# Patient Record
Sex: FEMALE | Race: ASIAN | NOT HISPANIC OR LATINO | URBAN - METROPOLITAN AREA
[De-identification: names, ages, dates, MRNs, and addresses within clinical notes are randomized per-mention and may not be internally consistent; named-entity substitution may affect disease eponyms.]

---

## 2022-01-01 ENCOUNTER — INPATIENT (INPATIENT)
Facility: HOSPITAL | Age: 72
LOS: 10 days | DRG: 23 | End: 2022-12-30
Attending: NEUROLOGICAL SURGERY | Admitting: NEUROLOGICAL SURGERY
Payer: COMMERCIAL

## 2022-01-01 ENCOUNTER — INPATIENT (INPATIENT)
Facility: HOSPITAL | Age: 72
LOS: 0 days | Discharge: ANOTHER HOSPITAL | End: 2022-12-19
Attending: INTERNAL MEDICINE | Admitting: INTERNAL MEDICINE
Payer: MEDICARE

## 2022-01-01 VITALS
RESPIRATION RATE: 18 BRPM | TEMPERATURE: 97 F | OXYGEN SATURATION: 100 % | HEART RATE: 83 BPM | SYSTOLIC BLOOD PRESSURE: 205 MMHG | DIASTOLIC BLOOD PRESSURE: 90 MMHG

## 2022-01-01 VITALS
SYSTOLIC BLOOD PRESSURE: 132 MMHG | DIASTOLIC BLOOD PRESSURE: 64 MMHG | RESPIRATION RATE: 12 BRPM | OXYGEN SATURATION: 100 % | HEART RATE: 78 BPM | TEMPERATURE: 99 F

## 2022-01-01 VITALS — WEIGHT: 117.29 LBS | HEIGHT: 64 IN

## 2022-01-01 VITALS — OXYGEN SATURATION: 98 % | HEART RATE: 100 BPM | TEMPERATURE: 100 F | RESPIRATION RATE: 19 BRPM

## 2022-01-01 DIAGNOSIS — I61.9 NONTRAUMATIC INTRACEREBRAL HEMORRHAGE, UNSPECIFIED: ICD-10-CM

## 2022-01-01 DIAGNOSIS — R53.2 FUNCTIONAL QUADRIPLEGIA: ICD-10-CM

## 2022-01-01 DIAGNOSIS — I62.9 NONTRAUMATIC INTRACRANIAL HEMORRHAGE, UNSPECIFIED: ICD-10-CM

## 2022-01-01 DIAGNOSIS — J96.90 RESPIRATORY FAILURE, UNSPECIFIED, UNSPECIFIED WHETHER WITH HYPOXIA OR HYPERCAPNIA: ICD-10-CM

## 2022-01-01 DIAGNOSIS — I61.0 NONTRAUMATIC INTRACEREBRAL HEMORRHAGE IN HEMISPHERE, SUBCORTICAL: ICD-10-CM

## 2022-01-01 DIAGNOSIS — Z51.5 ENCOUNTER FOR PALLIATIVE CARE: ICD-10-CM

## 2022-01-01 LAB
-  AMIKACIN: SIGNIFICANT CHANGE UP
-  AMOXICILLIN/CLAVULANIC ACID: SIGNIFICANT CHANGE UP
-  AMPICILLIN/SULBACTAM: SIGNIFICANT CHANGE UP
-  AMPICILLIN: SIGNIFICANT CHANGE UP
-  AZTREONAM: SIGNIFICANT CHANGE UP
-  CEFAZOLIN: SIGNIFICANT CHANGE UP
-  CEFEPIME: SIGNIFICANT CHANGE UP
-  CEFTRIAXONE: SIGNIFICANT CHANGE UP
-  CEFUROXIME: SIGNIFICANT CHANGE UP
-  CIPROFLOXACIN: SIGNIFICANT CHANGE UP
-  ERTAPENEM: SIGNIFICANT CHANGE UP
-  GENTAMICIN: SIGNIFICANT CHANGE UP
-  IMIPENEM: SIGNIFICANT CHANGE UP
-  LEVOFLOXACIN: SIGNIFICANT CHANGE UP
-  MEROPENEM: SIGNIFICANT CHANGE UP
-  NITROFURANTOIN: SIGNIFICANT CHANGE UP
-  PIPERACILLIN/TAZOBACTAM: SIGNIFICANT CHANGE UP
-  TOBRAMYCIN: SIGNIFICANT CHANGE UP
-  TRIMETHOPRIM/SULFAMETHOXAZOLE: SIGNIFICANT CHANGE UP
A1C WITH ESTIMATED AVERAGE GLUCOSE RESULT: 6.7 % — HIGH (ref 4–5.6)
A1C WITH ESTIMATED AVERAGE GLUCOSE RESULT: 6.8 % — HIGH (ref 4–5.6)
ALBUMIN SERPL ELPH-MCNC: 3.5 G/DL — SIGNIFICANT CHANGE UP (ref 3.3–5)
ALBUMIN SERPL ELPH-MCNC: 4.7 G/DL — SIGNIFICANT CHANGE UP (ref 3.3–5)
ALBUMIN SERPL ELPH-MCNC: 4.9 G/DL — SIGNIFICANT CHANGE UP (ref 3.3–5)
ALP SERPL-CCNC: 62 U/L — SIGNIFICANT CHANGE UP (ref 40–120)
ALP SERPL-CCNC: 80 U/L — SIGNIFICANT CHANGE UP (ref 40–120)
ALP SERPL-CCNC: 82 U/L — SIGNIFICANT CHANGE UP (ref 40–120)
ALT FLD-CCNC: 15 U/L — SIGNIFICANT CHANGE UP (ref 4–33)
ALT FLD-CCNC: 15 U/L — SIGNIFICANT CHANGE UP (ref 4–33)
ALT FLD-CCNC: 8 U/L — SIGNIFICANT CHANGE UP (ref 4–33)
ANION GAP SERPL CALC-SCNC: 10 MMOL/L — SIGNIFICANT CHANGE UP (ref 5–17)
ANION GAP SERPL CALC-SCNC: 11 MMOL/L — SIGNIFICANT CHANGE UP (ref 5–17)
ANION GAP SERPL CALC-SCNC: 12 MMOL/L — SIGNIFICANT CHANGE UP (ref 5–17)
ANION GAP SERPL CALC-SCNC: 12 MMOL/L — SIGNIFICANT CHANGE UP (ref 7–14)
ANION GAP SERPL CALC-SCNC: 13 MMOL/L — SIGNIFICANT CHANGE UP (ref 5–17)
ANION GAP SERPL CALC-SCNC: 14 MMOL/L — SIGNIFICANT CHANGE UP (ref 5–17)
ANION GAP SERPL CALC-SCNC: 17 MMOL/L — HIGH (ref 7–14)
ANION GAP SERPL CALC-SCNC: 18 MMOL/L — HIGH (ref 7–14)
ANION GAP SERPL CALC-SCNC: 8 MMOL/L — SIGNIFICANT CHANGE UP (ref 5–17)
ANION GAP SERPL CALC-SCNC: 9 MMOL/L — SIGNIFICANT CHANGE UP (ref 5–17)
ANION GAP SERPL CALC-SCNC: 9 MMOL/L — SIGNIFICANT CHANGE UP (ref 5–17)
ANISOCYTOSIS BLD QL: SIGNIFICANT CHANGE UP
APPEARANCE CSF: ABNORMAL
APPEARANCE CSF: ABNORMAL
APPEARANCE SPUN FLD: SIGNIFICANT CHANGE UP
APPEARANCE UR: ABNORMAL
APPEARANCE UR: ABNORMAL
APPEARANCE UR: CLEAR — SIGNIFICANT CHANGE UP
APTT BLD: 26.2 SEC — LOW (ref 27–36.3)
APTT BLD: 26.9 SEC — LOW (ref 27.5–35.5)
APTT BLD: 28.9 SEC — SIGNIFICANT CHANGE UP (ref 27–36.3)
AST SERPL-CCNC: 15 U/L — SIGNIFICANT CHANGE UP (ref 4–32)
AST SERPL-CCNC: 22 U/L — SIGNIFICANT CHANGE UP (ref 4–32)
AST SERPL-CCNC: 29 U/L — SIGNIFICANT CHANGE UP (ref 4–32)
BACTERIA # UR AUTO: ABNORMAL
BACTERIA # UR AUTO: ABNORMAL
BACTERIA # UR AUTO: NEGATIVE — SIGNIFICANT CHANGE UP
BASE EXCESS BLDV CALC-SCNC: 1.5 MMOL/L — SIGNIFICANT CHANGE UP (ref -2–3)
BASE EXCESS BLDV CALC-SCNC: 4.2 MMOL/L — HIGH (ref -2–3)
BASO STIPL BLD QL SMEAR: PRESENT — SIGNIFICANT CHANGE UP
BASOPHILS # BLD AUTO: 0.05 K/UL — SIGNIFICANT CHANGE UP (ref 0–0.2)
BASOPHILS # BLD AUTO: 0.1 K/UL — SIGNIFICANT CHANGE UP (ref 0–0.2)
BASOPHILS NFR BLD AUTO: 0.3 % — SIGNIFICANT CHANGE UP (ref 0–2)
BASOPHILS NFR BLD AUTO: 0.9 % — SIGNIFICANT CHANGE UP (ref 0–2)
BILIRUB SERPL-MCNC: 0.5 MG/DL — SIGNIFICANT CHANGE UP (ref 0.2–1.2)
BILIRUB SERPL-MCNC: 0.6 MG/DL — SIGNIFICANT CHANGE UP (ref 0.2–1.2)
BILIRUB SERPL-MCNC: 0.8 MG/DL — SIGNIFICANT CHANGE UP (ref 0.2–1.2)
BILIRUB UR-MCNC: NEGATIVE — SIGNIFICANT CHANGE UP
BLD GP AB SCN SERPL QL: NEGATIVE — SIGNIFICANT CHANGE UP
BLOOD GAS ARTERIAL COMPREHENSIVE RESULT: SIGNIFICANT CHANGE UP
BLOOD GAS VENOUS COMPREHENSIVE RESULT: SIGNIFICANT CHANGE UP
BUN SERPL-MCNC: 10 MG/DL — SIGNIFICANT CHANGE UP (ref 7–23)
BUN SERPL-MCNC: 11 MG/DL — SIGNIFICANT CHANGE UP (ref 7–23)
BUN SERPL-MCNC: 12 MG/DL — SIGNIFICANT CHANGE UP (ref 7–23)
BUN SERPL-MCNC: 14 MG/DL — SIGNIFICANT CHANGE UP (ref 7–23)
BUN SERPL-MCNC: 14 MG/DL — SIGNIFICANT CHANGE UP (ref 7–23)
BUN SERPL-MCNC: 16 MG/DL — SIGNIFICANT CHANGE UP (ref 7–23)
BUN SERPL-MCNC: 18 MG/DL — SIGNIFICANT CHANGE UP (ref 7–23)
BUN SERPL-MCNC: 18 MG/DL — SIGNIFICANT CHANGE UP (ref 7–23)
BUN SERPL-MCNC: 20 MG/DL — SIGNIFICANT CHANGE UP (ref 7–23)
BUN SERPL-MCNC: 21 MG/DL — SIGNIFICANT CHANGE UP (ref 7–23)
BUN SERPL-MCNC: 23 MG/DL — SIGNIFICANT CHANGE UP (ref 7–23)
CA-I SERPL-SCNC: 1.29 MMOL/L — SIGNIFICANT CHANGE UP (ref 1.15–1.33)
CALCIUM SERPL-MCNC: 10.5 MG/DL — SIGNIFICANT CHANGE UP (ref 8.4–10.5)
CALCIUM SERPL-MCNC: 7.6 MG/DL — LOW (ref 8.4–10.5)
CALCIUM SERPL-MCNC: 8.1 MG/DL — LOW (ref 8.4–10.5)
CALCIUM SERPL-MCNC: 8.5 MG/DL — SIGNIFICANT CHANGE UP (ref 8.4–10.5)
CALCIUM SERPL-MCNC: 8.6 MG/DL — SIGNIFICANT CHANGE UP (ref 8.4–10.5)
CALCIUM SERPL-MCNC: 8.7 MG/DL — SIGNIFICANT CHANGE UP (ref 8.4–10.5)
CALCIUM SERPL-MCNC: 8.7 MG/DL — SIGNIFICANT CHANGE UP (ref 8.4–10.5)
CALCIUM SERPL-MCNC: 8.8 MG/DL — SIGNIFICANT CHANGE UP (ref 8.4–10.5)
CALCIUM SERPL-MCNC: 8.9 MG/DL — SIGNIFICANT CHANGE UP (ref 8.4–10.5)
CALCIUM SERPL-MCNC: 9.3 MG/DL — SIGNIFICANT CHANGE UP (ref 8.4–10.5)
CALCIUM SERPL-MCNC: 9.4 MG/DL — SIGNIFICANT CHANGE UP (ref 8.4–10.5)
CALCIUM SERPL-MCNC: 9.6 MG/DL — SIGNIFICANT CHANGE UP (ref 8.4–10.5)
CHLORIDE BLDV-SCNC: 100 MMOL/L — SIGNIFICANT CHANGE UP (ref 96–108)
CHLORIDE BLDV-SCNC: 99 MMOL/L — SIGNIFICANT CHANGE UP (ref 96–108)
CHLORIDE SERPL-SCNC: 101 MMOL/L — SIGNIFICANT CHANGE UP (ref 98–107)
CHLORIDE SERPL-SCNC: 102 MMOL/L — SIGNIFICANT CHANGE UP (ref 96–108)
CHLORIDE SERPL-SCNC: 103 MMOL/L — SIGNIFICANT CHANGE UP (ref 96–108)
CHLORIDE SERPL-SCNC: 109 MMOL/L — HIGH (ref 96–108)
CHLORIDE SERPL-SCNC: 115 MMOL/L — HIGH (ref 96–108)
CHLORIDE SERPL-SCNC: 116 MMOL/L — HIGH (ref 96–108)
CHLORIDE SERPL-SCNC: 116 MMOL/L — HIGH (ref 96–108)
CHLORIDE SERPL-SCNC: 117 MMOL/L — HIGH (ref 96–108)
CHLORIDE SERPL-SCNC: 117 MMOL/L — HIGH (ref 96–108)
CHLORIDE SERPL-SCNC: 118 MMOL/L — HIGH (ref 96–108)
CHLORIDE SERPL-SCNC: 95 MMOL/L — LOW (ref 98–107)
CHLORIDE SERPL-SCNC: 97 MMOL/L — LOW (ref 98–107)
CHLORIDE SERPL-SCNC: 97 MMOL/L — SIGNIFICANT CHANGE UP (ref 96–108)
CHLORIDE SERPL-SCNC: 98 MMOL/L — SIGNIFICANT CHANGE UP (ref 96–108)
CHLORIDE SERPL-SCNC: 99 MMOL/L — SIGNIFICANT CHANGE UP (ref 96–108)
CHOLEST SERPL-MCNC: 178 MG/DL — SIGNIFICANT CHANGE UP
CO2 BLDV-SCNC: 30.4 MMOL/L — HIGH (ref 22–26)
CO2 BLDV-SCNC: 31.8 MMOL/L — HIGH (ref 22–26)
CO2 SERPL-SCNC: 21 MMOL/L — LOW (ref 22–31)
CO2 SERPL-SCNC: 22 MMOL/L — SIGNIFICANT CHANGE UP (ref 22–31)
CO2 SERPL-SCNC: 23 MMOL/L — SIGNIFICANT CHANGE UP (ref 22–31)
CO2 SERPL-SCNC: 24 MMOL/L — SIGNIFICANT CHANGE UP (ref 22–31)
CO2 SERPL-SCNC: 25 MMOL/L — SIGNIFICANT CHANGE UP (ref 22–31)
CO2 SERPL-SCNC: 26 MMOL/L — SIGNIFICANT CHANGE UP (ref 22–31)
CO2 SERPL-SCNC: 26 MMOL/L — SIGNIFICANT CHANGE UP (ref 22–31)
CO2 SERPL-SCNC: 27 MMOL/L — SIGNIFICANT CHANGE UP (ref 22–31)
CO2 SERPL-SCNC: 28 MMOL/L — SIGNIFICANT CHANGE UP (ref 22–31)
CO2 SERPL-SCNC: 28 MMOL/L — SIGNIFICANT CHANGE UP (ref 22–31)
CO2 SERPL-SCNC: 29 MMOL/L — SIGNIFICANT CHANGE UP (ref 22–31)
CO2 SERPL-SCNC: 29 MMOL/L — SIGNIFICANT CHANGE UP (ref 22–31)
COLOR CSF: ABNORMAL
COLOR CSF: ABNORMAL
COLOR SPEC: YELLOW — SIGNIFICANT CHANGE UP
COMMENT - URINE: SIGNIFICANT CHANGE UP
COMMENT - URINE: SIGNIFICANT CHANGE UP
CREAT SERPL-MCNC: 0.33 MG/DL — LOW (ref 0.5–1.3)
CREAT SERPL-MCNC: 0.34 MG/DL — LOW (ref 0.5–1.3)
CREAT SERPL-MCNC: 0.37 MG/DL — LOW (ref 0.5–1.3)
CREAT SERPL-MCNC: 0.41 MG/DL — LOW (ref 0.5–1.3)
CREAT SERPL-MCNC: 0.42 MG/DL — LOW (ref 0.5–1.3)
CREAT SERPL-MCNC: 0.44 MG/DL — LOW (ref 0.5–1.3)
CREAT SERPL-MCNC: 0.44 MG/DL — LOW (ref 0.5–1.3)
CREAT SERPL-MCNC: 0.45 MG/DL — LOW (ref 0.5–1.3)
CREAT SERPL-MCNC: 0.46 MG/DL — LOW (ref 0.5–1.3)
CREAT SERPL-MCNC: 0.46 MG/DL — LOW (ref 0.5–1.3)
CREAT SERPL-MCNC: 0.47 MG/DL — LOW (ref 0.5–1.3)
CREAT SERPL-MCNC: 0.48 MG/DL — LOW (ref 0.5–1.3)
CREAT SERPL-MCNC: 0.49 MG/DL — LOW (ref 0.5–1.3)
CREAT SERPL-MCNC: 0.49 MG/DL — LOW (ref 0.5–1.3)
CREAT SERPL-MCNC: 0.54 MG/DL — SIGNIFICANT CHANGE UP (ref 0.5–1.3)
CREAT SERPL-MCNC: 0.59 MG/DL — SIGNIFICANT CHANGE UP (ref 0.5–1.3)
CREAT SERPL-MCNC: 0.68 MG/DL — SIGNIFICANT CHANGE UP (ref 0.5–1.3)
CULTURE RESULTS: NO GROWTH — SIGNIFICANT CHANGE UP
CULTURE RESULTS: SIGNIFICANT CHANGE UP
DIFF PNL FLD: ABNORMAL
DIFF PNL FLD: NEGATIVE — SIGNIFICANT CHANGE UP
EGFR: 101 ML/MIN/1.73M2 — SIGNIFICANT CHANGE UP
EGFR: 101 ML/MIN/1.73M2 — SIGNIFICANT CHANGE UP
EGFR: 102 ML/MIN/1.73M2 — SIGNIFICANT CHANGE UP
EGFR: 102 ML/MIN/1.73M2 — SIGNIFICANT CHANGE UP
EGFR: 103 ML/MIN/1.73M2 — SIGNIFICANT CHANGE UP
EGFR: 104 ML/MIN/1.73M2 — SIGNIFICANT CHANGE UP
EGFR: 104 ML/MIN/1.73M2 — SIGNIFICANT CHANGE UP
EGFR: 105 ML/MIN/1.73M2 — SIGNIFICANT CHANGE UP
EGFR: 106 ML/MIN/1.73M2 — SIGNIFICANT CHANGE UP
EGFR: 108 ML/MIN/1.73M2 — SIGNIFICANT CHANGE UP
EGFR: 111 ML/MIN/1.73M2 — SIGNIFICANT CHANGE UP
EGFR: 111 ML/MIN/1.73M2 — SIGNIFICANT CHANGE UP
EGFR: 94 ML/MIN/1.73M2 — SIGNIFICANT CHANGE UP
EGFR: 97 ML/MIN/1.73M2 — SIGNIFICANT CHANGE UP
EGFR: 99 ML/MIN/1.73M2 — SIGNIFICANT CHANGE UP
EOSINOPHIL # BLD AUTO: 0.02 K/UL — SIGNIFICANT CHANGE UP (ref 0–0.5)
EOSINOPHIL # BLD AUTO: 0.2 K/UL — SIGNIFICANT CHANGE UP (ref 0–0.5)
EOSINOPHIL # CSF: 1 % — SIGNIFICANT CHANGE UP
EOSINOPHIL # CSF: 1 % — SIGNIFICANT CHANGE UP
EOSINOPHIL NFR BLD AUTO: 0.1 % — SIGNIFICANT CHANGE UP (ref 0–6)
EOSINOPHIL NFR BLD AUTO: 1.8 % — SIGNIFICANT CHANGE UP (ref 0–6)
EPI CELLS # UR: 1 /HPF — SIGNIFICANT CHANGE UP
EPI CELLS # UR: 3 /HPF — SIGNIFICANT CHANGE UP
EPI CELLS # UR: 4 /HPF — SIGNIFICANT CHANGE UP
ESTIMATED AVERAGE GLUCOSE: 146 — SIGNIFICANT CHANGE UP
ESTIMATED AVERAGE GLUCOSE: 148 MG/DL — HIGH (ref 68–114)
FERRITIN SERPL-MCNC: 105 NG/ML — SIGNIFICANT CHANGE UP (ref 15–150)
FLUAV AG NPH QL: SIGNIFICANT CHANGE UP
FLUBV AG NPH QL: SIGNIFICANT CHANGE UP
GAS PNL BLDA: SIGNIFICANT CHANGE UP
GAS PNL BLDV: 134 MMOL/L — LOW (ref 136–145)
GAS PNL BLDV: 136 MMOL/L — SIGNIFICANT CHANGE UP (ref 136–145)
GAS PNL BLDV: SIGNIFICANT CHANGE UP
GIANT PLATELETS BLD QL SMEAR: PRESENT — SIGNIFICANT CHANGE UP
GLUCOSE BLDC GLUCOMTR-MCNC: 103 MG/DL — HIGH (ref 70–99)
GLUCOSE BLDC GLUCOMTR-MCNC: 108 MG/DL — HIGH (ref 70–99)
GLUCOSE BLDC GLUCOMTR-MCNC: 120 MG/DL — HIGH (ref 70–99)
GLUCOSE BLDC GLUCOMTR-MCNC: 127 MG/DL — HIGH (ref 70–99)
GLUCOSE BLDC GLUCOMTR-MCNC: 137 MG/DL — HIGH (ref 70–99)
GLUCOSE BLDC GLUCOMTR-MCNC: 139 MG/DL — HIGH (ref 70–99)
GLUCOSE BLDC GLUCOMTR-MCNC: 150 MG/DL — HIGH (ref 70–99)
GLUCOSE BLDC GLUCOMTR-MCNC: 150 MG/DL — HIGH (ref 70–99)
GLUCOSE BLDC GLUCOMTR-MCNC: 157 MG/DL — HIGH (ref 70–99)
GLUCOSE BLDC GLUCOMTR-MCNC: 159 MG/DL — HIGH (ref 70–99)
GLUCOSE BLDC GLUCOMTR-MCNC: 162 MG/DL — HIGH (ref 70–99)
GLUCOSE BLDC GLUCOMTR-MCNC: 164 MG/DL — HIGH (ref 70–99)
GLUCOSE BLDC GLUCOMTR-MCNC: 166 MG/DL — HIGH (ref 70–99)
GLUCOSE BLDC GLUCOMTR-MCNC: 169 MG/DL — HIGH (ref 70–99)
GLUCOSE BLDC GLUCOMTR-MCNC: 170 MG/DL — HIGH (ref 70–99)
GLUCOSE BLDC GLUCOMTR-MCNC: 170 MG/DL — HIGH (ref 70–99)
GLUCOSE BLDC GLUCOMTR-MCNC: 171 MG/DL — HIGH (ref 70–99)
GLUCOSE BLDC GLUCOMTR-MCNC: 172 MG/DL — HIGH (ref 70–99)
GLUCOSE BLDC GLUCOMTR-MCNC: 172 MG/DL — HIGH (ref 70–99)
GLUCOSE BLDC GLUCOMTR-MCNC: 178 MG/DL — HIGH (ref 70–99)
GLUCOSE BLDC GLUCOMTR-MCNC: 180 MG/DL — HIGH (ref 70–99)
GLUCOSE BLDC GLUCOMTR-MCNC: 182 MG/DL — HIGH (ref 70–99)
GLUCOSE BLDC GLUCOMTR-MCNC: 183 MG/DL — HIGH (ref 70–99)
GLUCOSE BLDC GLUCOMTR-MCNC: 186 MG/DL — HIGH (ref 70–99)
GLUCOSE BLDC GLUCOMTR-MCNC: 190 MG/DL — HIGH (ref 70–99)
GLUCOSE BLDC GLUCOMTR-MCNC: 192 MG/DL — HIGH (ref 70–99)
GLUCOSE BLDC GLUCOMTR-MCNC: 197 MG/DL — HIGH (ref 70–99)
GLUCOSE BLDC GLUCOMTR-MCNC: 198 MG/DL — HIGH (ref 70–99)
GLUCOSE BLDC GLUCOMTR-MCNC: 213 MG/DL — HIGH (ref 70–99)
GLUCOSE BLDC GLUCOMTR-MCNC: 218 MG/DL — HIGH (ref 70–99)
GLUCOSE BLDC GLUCOMTR-MCNC: 225 MG/DL — HIGH (ref 70–99)
GLUCOSE BLDC GLUCOMTR-MCNC: 225 MG/DL — HIGH (ref 70–99)
GLUCOSE BLDC GLUCOMTR-MCNC: 227 MG/DL — HIGH (ref 70–99)
GLUCOSE BLDC GLUCOMTR-MCNC: 234 MG/DL — HIGH (ref 70–99)
GLUCOSE BLDC GLUCOMTR-MCNC: 240 MG/DL — HIGH (ref 70–99)
GLUCOSE BLDC GLUCOMTR-MCNC: 242 MG/DL — HIGH (ref 70–99)
GLUCOSE BLDC GLUCOMTR-MCNC: 257 MG/DL — HIGH (ref 70–99)
GLUCOSE BLDC GLUCOMTR-MCNC: 261 MG/DL — HIGH (ref 70–99)
GLUCOSE BLDC GLUCOMTR-MCNC: 267 MG/DL — HIGH (ref 70–99)
GLUCOSE BLDC GLUCOMTR-MCNC: 274 MG/DL — HIGH (ref 70–99)
GLUCOSE BLDC GLUCOMTR-MCNC: 293 MG/DL — HIGH (ref 70–99)
GLUCOSE BLDC GLUCOMTR-MCNC: 77 MG/DL — SIGNIFICANT CHANGE UP (ref 70–99)
GLUCOSE BLDC GLUCOMTR-MCNC: 77 MG/DL — SIGNIFICANT CHANGE UP (ref 70–99)
GLUCOSE BLDV-MCNC: 152 MG/DL — HIGH (ref 70–99)
GLUCOSE BLDV-MCNC: 191 MG/DL — HIGH (ref 70–99)
GLUCOSE CSF-MCNC: 118 MG/DL — HIGH (ref 40–70)
GLUCOSE CSF-MCNC: 124 MG/DL — HIGH (ref 40–70)
GLUCOSE SERPL-MCNC: 109 MG/DL — HIGH (ref 70–99)
GLUCOSE SERPL-MCNC: 111 MG/DL — HIGH (ref 70–99)
GLUCOSE SERPL-MCNC: 114 MG/DL — HIGH (ref 70–99)
GLUCOSE SERPL-MCNC: 144 MG/DL — HIGH (ref 70–99)
GLUCOSE SERPL-MCNC: 151 MG/DL — HIGH (ref 70–99)
GLUCOSE SERPL-MCNC: 171 MG/DL — HIGH (ref 70–99)
GLUCOSE SERPL-MCNC: 172 MG/DL — HIGH (ref 70–99)
GLUCOSE SERPL-MCNC: 182 MG/DL — HIGH (ref 70–99)
GLUCOSE SERPL-MCNC: 186 MG/DL — HIGH (ref 70–99)
GLUCOSE SERPL-MCNC: 188 MG/DL — HIGH (ref 70–99)
GLUCOSE SERPL-MCNC: 193 MG/DL — HIGH (ref 70–99)
GLUCOSE SERPL-MCNC: 194 MG/DL — HIGH (ref 70–99)
GLUCOSE SERPL-MCNC: 196 MG/DL — HIGH (ref 70–99)
GLUCOSE SERPL-MCNC: 218 MG/DL — HIGH (ref 70–99)
GLUCOSE SERPL-MCNC: 221 MG/DL — HIGH (ref 70–99)
GLUCOSE SERPL-MCNC: 221 MG/DL — HIGH (ref 70–99)
GLUCOSE SERPL-MCNC: 236 MG/DL — HIGH (ref 70–99)
GLUCOSE UR QL: ABNORMAL
GLUCOSE UR QL: NEGATIVE — SIGNIFICANT CHANGE UP
GRAM STN FLD: SIGNIFICANT CHANGE UP
HCO3 BLDV-SCNC: 29 MMOL/L — SIGNIFICANT CHANGE UP (ref 22–29)
HCO3 BLDV-SCNC: 30 MMOL/L — HIGH (ref 22–29)
HCT VFR BLD CALC: 21.9 % — LOW (ref 34.5–45)
HCT VFR BLD CALC: 22.9 % — LOW (ref 34.5–45)
HCT VFR BLD CALC: 23.9 % — LOW (ref 34.5–45)
HCT VFR BLD CALC: 24.6 % — LOW (ref 34.5–45)
HCT VFR BLD CALC: 24.8 % — LOW (ref 34.5–45)
HCT VFR BLD CALC: 25.5 % — LOW (ref 34.5–45)
HCT VFR BLD CALC: 26.7 % — LOW (ref 34.5–45)
HCT VFR BLD CALC: 27.4 % — LOW (ref 34.5–45)
HCT VFR BLD CALC: 29 % — LOW (ref 34.5–45)
HCT VFR BLD CALC: 29.6 % — LOW (ref 34.5–45)
HCT VFR BLD CALC: 29.7 % — LOW (ref 34.5–45)
HCT VFR BLD CALC: 32 % — LOW (ref 34.5–45)
HCT VFR BLD CALC: 32.1 % — LOW (ref 34.5–45)
HCT VFR BLD CALC: 32.1 % — LOW (ref 34.5–45)
HCT VFR BLD CALC: 32.6 % — LOW (ref 34.5–45)
HCT VFR BLD CALC: 34.1 % — LOW (ref 34.5–45)
HCT VFR BLD CALC: 34.9 % — SIGNIFICANT CHANGE UP (ref 34.5–45)
HCT VFR BLD CALC: 36.3 % — SIGNIFICANT CHANGE UP (ref 34.5–45)
HCT VFR BLDA CALC: 33 % — LOW (ref 34.5–46.5)
HCT VFR BLDA CALC: 33 % — LOW (ref 34.5–46.5)
HCV AB S/CO SERPL IA: 0.11 S/CO — SIGNIFICANT CHANGE UP (ref 0–0.99)
HCV AB SERPL-IMP: SIGNIFICANT CHANGE UP
HDLC SERPL-MCNC: 40 MG/DL — LOW
HGB BLD CALC-MCNC: 11 G/DL — LOW (ref 11.5–15.5)
HGB BLD CALC-MCNC: 11.1 G/DL — LOW (ref 11.5–15.5)
HGB BLD-MCNC: 10 G/DL — LOW (ref 11.5–15.5)
HGB BLD-MCNC: 10.1 G/DL — LOW (ref 11.5–15.5)
HGB BLD-MCNC: 10.2 G/DL — LOW (ref 11.5–15.5)
HGB BLD-MCNC: 11.1 G/DL — LOW (ref 11.5–15.5)
HGB BLD-MCNC: 11.7 G/DL — SIGNIFICANT CHANGE UP (ref 11.5–15.5)
HGB BLD-MCNC: 6.5 G/DL — CRITICAL LOW (ref 11.5–15.5)
HGB BLD-MCNC: 6.9 G/DL — CRITICAL LOW (ref 11.5–15.5)
HGB BLD-MCNC: 7.1 G/DL — LOW (ref 11.5–15.5)
HGB BLD-MCNC: 7.2 G/DL — LOW (ref 11.5–15.5)
HGB BLD-MCNC: 7.3 G/DL — LOW (ref 11.5–15.5)
HGB BLD-MCNC: 7.6 G/DL — LOW (ref 11.5–15.5)
HGB BLD-MCNC: 8.4 G/DL — LOW (ref 11.5–15.5)
HGB BLD-MCNC: 8.6 G/DL — LOW (ref 11.5–15.5)
HGB BLD-MCNC: 8.8 G/DL — LOW (ref 11.5–15.5)
HGB BLD-MCNC: 8.8 G/DL — LOW (ref 11.5–15.5)
HGB BLD-MCNC: 9 G/DL — LOW (ref 11.5–15.5)
HGB BLD-MCNC: 9.9 G/DL — LOW (ref 11.5–15.5)
HGB BLD-MCNC: 9.9 G/DL — LOW (ref 11.5–15.5)
HYALINE CASTS # UR AUTO: 0 /LPF — SIGNIFICANT CHANGE UP (ref 0–2)
HYALINE CASTS # UR AUTO: 1 /LPF — SIGNIFICANT CHANGE UP (ref 0–2)
HYALINE CASTS # UR AUTO: 1 /LPF — SIGNIFICANT CHANGE UP (ref 0–2)
HYALINE CASTS # UR AUTO: 2 /LPF — SIGNIFICANT CHANGE UP (ref 0–2)
HYALINE CASTS # UR AUTO: 6 /LPF — HIGH (ref 0–2)
HYPOCHROMIA BLD QL: SIGNIFICANT CHANGE UP
IANC: 13.74 K/UL — HIGH (ref 1.8–7.4)
IANC: 5.89 K/UL — SIGNIFICANT CHANGE UP (ref 1.8–7.4)
IMM GRANULOCYTES NFR BLD AUTO: 0.5 % — SIGNIFICANT CHANGE UP (ref 0–0.9)
INR BLD: 0.97 RATIO — SIGNIFICANT CHANGE UP (ref 0.88–1.16)
INR BLD: 0.99 RATIO — SIGNIFICANT CHANGE UP (ref 0.88–1.16)
INR BLD: 1.19 RATIO — HIGH (ref 0.88–1.16)
IRON SATN MFR SERPL: 14 % — SIGNIFICANT CHANGE UP (ref 14–50)
IRON SATN MFR SERPL: 33 UG/DL — SIGNIFICANT CHANGE UP (ref 30–160)
KETONES UR-MCNC: NEGATIVE — SIGNIFICANT CHANGE UP
KETONES UR-MCNC: NEGATIVE — SIGNIFICANT CHANGE UP
KETONES UR-MCNC: SIGNIFICANT CHANGE UP
LACTATE BLDV-MCNC: 2.9 MMOL/L — HIGH (ref 0.5–2)
LACTATE BLDV-MCNC: 3.6 MMOL/L — HIGH (ref 0.5–2)
LACTATE CSF-MCNC: 4.8 MMOL/L — HIGH (ref 1.1–2.4)
LACTATE CSF-MCNC: 6.4 MMOL/L — HIGH (ref 1.1–2.4)
LEUKOCYTE ESTERASE UR-ACNC: ABNORMAL
LEUKOCYTE ESTERASE UR-ACNC: NEGATIVE — SIGNIFICANT CHANGE UP
LIPID PNL WITH DIRECT LDL SERPL: 97 MG/DL — SIGNIFICANT CHANGE UP
LYMPHOCYTES # BLD AUTO: 1.16 K/UL — SIGNIFICANT CHANGE UP (ref 1–3.3)
LYMPHOCYTES # BLD AUTO: 1.3 K/UL — SIGNIFICANT CHANGE UP (ref 1–3.3)
LYMPHOCYTES # BLD AUTO: 11.6 % — LOW (ref 13–44)
LYMPHOCYTES # BLD AUTO: 7.5 % — LOW (ref 13–44)
LYMPHOCYTES # CSF: 4 % — LOW (ref 40–80)
LYMPHOCYTES # CSF: 7 % — LOW (ref 40–80)
MAGNESIUM SERPL-MCNC: 1.7 MG/DL — SIGNIFICANT CHANGE UP (ref 1.6–2.6)
MAGNESIUM SERPL-MCNC: 1.8 MG/DL — SIGNIFICANT CHANGE UP (ref 1.6–2.6)
MAGNESIUM SERPL-MCNC: 1.9 MG/DL — SIGNIFICANT CHANGE UP (ref 1.6–2.6)
MAGNESIUM SERPL-MCNC: 2 MG/DL — SIGNIFICANT CHANGE UP (ref 1.6–2.6)
MAGNESIUM SERPL-MCNC: 2.1 MG/DL — SIGNIFICANT CHANGE UP (ref 1.6–2.6)
MAGNESIUM SERPL-MCNC: 2.1 MG/DL — SIGNIFICANT CHANGE UP (ref 1.6–2.6)
MAGNESIUM SERPL-MCNC: 2.2 MG/DL — SIGNIFICANT CHANGE UP (ref 1.6–2.6)
MAGNESIUM SERPL-MCNC: 2.3 MG/DL — SIGNIFICANT CHANGE UP (ref 1.6–2.6)
MAGNESIUM SERPL-MCNC: 2.7 MG/DL — HIGH (ref 1.6–2.6)
MCHC RBC-ENTMCNC: 18.7 PG — LOW (ref 27–34)
MCHC RBC-ENTMCNC: 18.8 PG — LOW (ref 27–34)
MCHC RBC-ENTMCNC: 18.8 PG — LOW (ref 27–34)
MCHC RBC-ENTMCNC: 19 PG — LOW (ref 27–34)
MCHC RBC-ENTMCNC: 19 PG — LOW (ref 27–34)
MCHC RBC-ENTMCNC: 19.1 PG — LOW (ref 27–34)
MCHC RBC-ENTMCNC: 19.2 PG — LOW (ref 27–34)
MCHC RBC-ENTMCNC: 19.2 PG — LOW (ref 27–34)
MCHC RBC-ENTMCNC: 19.3 PG — LOW (ref 27–34)
MCHC RBC-ENTMCNC: 19.7 PG — LOW (ref 27–34)
MCHC RBC-ENTMCNC: 20.7 PG — LOW (ref 27–34)
MCHC RBC-ENTMCNC: 20.8 PG — LOW (ref 27–34)
MCHC RBC-ENTMCNC: 20.9 PG — LOW (ref 27–34)
MCHC RBC-ENTMCNC: 21.1 PG — LOW (ref 27–34)
MCHC RBC-ENTMCNC: 21.2 PG — LOW (ref 27–34)
MCHC RBC-ENTMCNC: 21.3 PG — LOW (ref 27–34)
MCHC RBC-ENTMCNC: 21.3 PG — LOW (ref 27–34)
MCHC RBC-ENTMCNC: 29 GM/DL — LOW (ref 32–36)
MCHC RBC-ENTMCNC: 29.6 GM/DL — LOW (ref 32–36)
MCHC RBC-ENTMCNC: 29.7 GM/DL — LOW (ref 32–36)
MCHC RBC-ENTMCNC: 29.8 GM/DL — LOW (ref 32–36)
MCHC RBC-ENTMCNC: 30.1 GM/DL — LOW (ref 32–36)
MCHC RBC-ENTMCNC: 30.3 GM/DL — LOW (ref 32–36)
MCHC RBC-ENTMCNC: 30.3 GM/DL — LOW (ref 32–36)
MCHC RBC-ENTMCNC: 30.6 GM/DL — LOW (ref 32–36)
MCHC RBC-ENTMCNC: 30.7 GM/DL — LOW (ref 32–36)
MCHC RBC-ENTMCNC: 30.8 GM/DL — LOW (ref 32–36)
MCHC RBC-ENTMCNC: 30.9 GM/DL — LOW (ref 32–36)
MCHC RBC-ENTMCNC: 31.4 GM/DL — LOW (ref 32–36)
MCHC RBC-ENTMCNC: 31.5 GM/DL — LOW (ref 32–36)
MCHC RBC-ENTMCNC: 31.8 GM/DL — LOW (ref 32–36)
MCHC RBC-ENTMCNC: 32 PG — SIGNIFICANT CHANGE UP (ref 27–34)
MCHC RBC-ENTMCNC: 33.5 GM/DL — SIGNIFICANT CHANGE UP (ref 32–36)
MCV RBC AUTO: 62.4 FL — LOW (ref 80–100)
MCV RBC AUTO: 62.7 FL — LOW (ref 80–100)
MCV RBC AUTO: 63.3 FL — LOW (ref 80–100)
MCV RBC AUTO: 63.3 FL — LOW (ref 80–100)
MCV RBC AUTO: 64.1 FL — LOW (ref 80–100)
MCV RBC AUTO: 64.6 FL — LOW (ref 80–100)
MCV RBC AUTO: 64.6 FL — LOW (ref 80–100)
MCV RBC AUTO: 64.8 FL — LOW (ref 80–100)
MCV RBC AUTO: 64.8 FL — LOW (ref 80–100)
MCV RBC AUTO: 65.2 FL — LOW (ref 80–100)
MCV RBC AUTO: 66.9 FL — LOW (ref 80–100)
MCV RBC AUTO: 67 FL — LOW (ref 80–100)
MCV RBC AUTO: 67.4 FL — LOW (ref 80–100)
MCV RBC AUTO: 67.7 FL — LOW (ref 80–100)
MCV RBC AUTO: 68 FL — LOW (ref 80–100)
MCV RBC AUTO: 68.1 FL — LOW (ref 80–100)
MCV RBC AUTO: 69.1 FL — LOW (ref 80–100)
MCV RBC AUTO: 95.4 FL — SIGNIFICANT CHANGE UP (ref 80–100)
METHOD TYPE: SIGNIFICANT CHANGE UP
MICROCYTES BLD QL: SIGNIFICANT CHANGE UP
MONOCYTES # BLD AUTO: 0.2 K/UL — SIGNIFICANT CHANGE UP (ref 0–0.9)
MONOCYTES # BLD AUTO: 0.34 K/UL — SIGNIFICANT CHANGE UP (ref 0–0.9)
MONOCYTES NFR BLD AUTO: 1.8 % — LOW (ref 2–14)
MONOCYTES NFR BLD AUTO: 2.2 % — SIGNIFICANT CHANGE UP (ref 2–14)
MONOS+MACROS NFR CSF: 4 % — LOW (ref 15–45)
MONOS+MACROS NFR CSF: 4 % — LOW (ref 15–45)
MRSA PCR RESULT.: SIGNIFICANT CHANGE UP
MRSA PCR RESULT.: SIGNIFICANT CHANGE UP
NEUTROPHILS # BLD AUTO: 13.74 K/UL — HIGH (ref 1.8–7.4)
NEUTROPHILS # BLD AUTO: 6.09 K/UL — SIGNIFICANT CHANGE UP (ref 1.8–7.4)
NEUTROPHILS # CSF: 88 % — HIGH (ref 0–6)
NEUTROPHILS # CSF: 91 % — HIGH (ref 0–6)
NEUTROPHILS NFR BLD AUTO: 53.6 % — SIGNIFICANT CHANGE UP (ref 43–77)
NEUTROPHILS NFR BLD AUTO: 89.4 % — HIGH (ref 43–77)
NEUTS BAND # BLD: 0.9 % — SIGNIFICANT CHANGE UP (ref 0–6)
NITRITE UR-MCNC: NEGATIVE — SIGNIFICANT CHANGE UP
NON HDL CHOLESTEROL: 138 MG/DL — HIGH
NRBC # BLD: 0 /100 WBCS — SIGNIFICANT CHANGE UP (ref 0–0)
NRBC # FLD: 0 K/UL — SIGNIFICANT CHANGE UP (ref 0–0)
NRBC NFR CSF: 400 /UL — HIGH (ref 0–5)
NRBC NFR CSF: 89 /UL — HIGH (ref 0–5)
ORGANISM # SPEC MICROSCOPIC CNT: SIGNIFICANT CHANGE UP
ORGANISM # SPEC MICROSCOPIC CNT: SIGNIFICANT CHANGE UP
OVALOCYTES BLD QL SMEAR: SLIGHT — SIGNIFICANT CHANGE UP
PA ADP PRP-ACNC: 278 PRU — SIGNIFICANT CHANGE UP (ref 194–417)
PCO2 BLDV: 51 MMHG — HIGH (ref 39–42)
PCO2 BLDV: 57 MMHG — HIGH (ref 39–42)
PH BLDV: 7.31 — LOW (ref 7.32–7.43)
PH BLDV: 7.38 — SIGNIFICANT CHANGE UP (ref 7.32–7.43)
PH UR: 6 — SIGNIFICANT CHANGE UP (ref 5–8)
PH UR: 6 — SIGNIFICANT CHANGE UP (ref 5–8)
PH UR: 6.5 — SIGNIFICANT CHANGE UP (ref 5–8)
PH UR: 7 — SIGNIFICANT CHANGE UP (ref 5–8)
PH UR: 7.5 — SIGNIFICANT CHANGE UP (ref 5–8)
PHOSPHATE SERPL-MCNC: 1.5 MG/DL — LOW (ref 2.5–4.5)
PHOSPHATE SERPL-MCNC: 1.8 MG/DL — LOW (ref 2.5–4.5)
PHOSPHATE SERPL-MCNC: 2.4 MG/DL — LOW (ref 2.5–4.5)
PHOSPHATE SERPL-MCNC: 2.5 MG/DL — SIGNIFICANT CHANGE UP (ref 2.5–4.5)
PHOSPHATE SERPL-MCNC: 2.9 MG/DL — SIGNIFICANT CHANGE UP (ref 2.5–4.5)
PHOSPHATE SERPL-MCNC: 2.9 MG/DL — SIGNIFICANT CHANGE UP (ref 2.5–4.5)
PHOSPHATE SERPL-MCNC: 3.1 MG/DL — SIGNIFICANT CHANGE UP (ref 2.5–4.5)
PHOSPHATE SERPL-MCNC: 3.2 MG/DL — SIGNIFICANT CHANGE UP (ref 2.5–4.5)
PHOSPHATE SERPL-MCNC: 3.3 MG/DL — SIGNIFICANT CHANGE UP (ref 2.5–4.5)
PHOSPHATE SERPL-MCNC: 3.3 MG/DL — SIGNIFICANT CHANGE UP (ref 2.5–4.5)
PHOSPHATE SERPL-MCNC: 3.7 MG/DL — SIGNIFICANT CHANGE UP (ref 2.5–4.5)
PHOSPHATE SERPL-MCNC: 4.1 MG/DL — SIGNIFICANT CHANGE UP (ref 2.5–4.5)
PHOSPHATE SERPL-MCNC: 4.2 MG/DL — SIGNIFICANT CHANGE UP (ref 2.5–4.5)
PHOSPHATE SERPL-MCNC: 4.5 MG/DL — SIGNIFICANT CHANGE UP (ref 2.5–4.5)
PHOSPHATE SERPL-MCNC: 4.8 MG/DL — HIGH (ref 2.5–4.5)
PLAT MORPH BLD: ABNORMAL
PLATELET # BLD AUTO: 260 K/UL — SIGNIFICANT CHANGE UP (ref 150–400)
PLATELET # BLD AUTO: 266 K/UL — SIGNIFICANT CHANGE UP (ref 150–400)
PLATELET # BLD AUTO: 274 K/UL — SIGNIFICANT CHANGE UP (ref 150–400)
PLATELET # BLD AUTO: 275 K/UL — SIGNIFICANT CHANGE UP (ref 150–400)
PLATELET # BLD AUTO: 287 K/UL — SIGNIFICANT CHANGE UP (ref 150–400)
PLATELET # BLD AUTO: 288 K/UL — SIGNIFICANT CHANGE UP (ref 150–400)
PLATELET # BLD AUTO: 294 K/UL — SIGNIFICANT CHANGE UP (ref 150–400)
PLATELET # BLD AUTO: 299 K/UL — SIGNIFICANT CHANGE UP (ref 150–400)
PLATELET # BLD AUTO: 326 K/UL — SIGNIFICANT CHANGE UP (ref 150–400)
PLATELET # BLD AUTO: 327 K/UL — SIGNIFICANT CHANGE UP (ref 150–400)
PLATELET # BLD AUTO: 373 K/UL — SIGNIFICANT CHANGE UP (ref 150–400)
PLATELET # BLD AUTO: 373 K/UL — SIGNIFICANT CHANGE UP (ref 150–400)
PLATELET # BLD AUTO: 395 K/UL — SIGNIFICANT CHANGE UP (ref 150–400)
PLATELET # BLD AUTO: 420 K/UL — HIGH (ref 150–400)
PLATELET # BLD AUTO: 433 K/UL — HIGH (ref 150–400)
PLATELET # BLD AUTO: 480 K/UL — HIGH (ref 150–400)
PLATELET # BLD AUTO: 511 K/UL — HIGH (ref 150–400)
PLATELET # BLD AUTO: 574 K/UL — HIGH (ref 150–400)
PLATELET COUNT - ESTIMATE: NORMAL — SIGNIFICANT CHANGE UP
PLATELET RESPONSE ASPIRIN RESULT: 630 ARU — SIGNIFICANT CHANGE UP (ref 350–700)
PO2 BLDV: 114 MMHG — SIGNIFICANT CHANGE UP
PO2 BLDV: 58 MMHG — SIGNIFICANT CHANGE UP
POIKILOCYTOSIS BLD QL AUTO: SIGNIFICANT CHANGE UP
POLYCHROMASIA BLD QL SMEAR: SLIGHT — SIGNIFICANT CHANGE UP
POTASSIUM BLDV-SCNC: 4 MMOL/L — SIGNIFICANT CHANGE UP (ref 3.5–5.1)
POTASSIUM BLDV-SCNC: 4.1 MMOL/L — SIGNIFICANT CHANGE UP (ref 3.5–5.1)
POTASSIUM SERPL-MCNC: 3.3 MMOL/L — LOW (ref 3.5–5.3)
POTASSIUM SERPL-MCNC: 3.5 MMOL/L — SIGNIFICANT CHANGE UP (ref 3.5–5.3)
POTASSIUM SERPL-MCNC: 3.5 MMOL/L — SIGNIFICANT CHANGE UP (ref 3.5–5.3)
POTASSIUM SERPL-MCNC: 3.6 MMOL/L — SIGNIFICANT CHANGE UP (ref 3.5–5.3)
POTASSIUM SERPL-MCNC: 3.6 MMOL/L — SIGNIFICANT CHANGE UP (ref 3.5–5.3)
POTASSIUM SERPL-MCNC: 3.7 MMOL/L — SIGNIFICANT CHANGE UP (ref 3.5–5.3)
POTASSIUM SERPL-MCNC: 3.8 MMOL/L — SIGNIFICANT CHANGE UP (ref 3.5–5.3)
POTASSIUM SERPL-MCNC: 3.9 MMOL/L — SIGNIFICANT CHANGE UP (ref 3.5–5.3)
POTASSIUM SERPL-MCNC: 3.9 MMOL/L — SIGNIFICANT CHANGE UP (ref 3.5–5.3)
POTASSIUM SERPL-MCNC: 4 MMOL/L — SIGNIFICANT CHANGE UP (ref 3.5–5.3)
POTASSIUM SERPL-MCNC: 4.1 MMOL/L — SIGNIFICANT CHANGE UP (ref 3.5–5.3)
POTASSIUM SERPL-MCNC: 4.3 MMOL/L — SIGNIFICANT CHANGE UP (ref 3.5–5.3)
POTASSIUM SERPL-MCNC: 4.5 MMOL/L — SIGNIFICANT CHANGE UP (ref 3.5–5.3)
POTASSIUM SERPL-MCNC: 4.7 MMOL/L — SIGNIFICANT CHANGE UP (ref 3.5–5.3)
POTASSIUM SERPL-SCNC: 3.3 MMOL/L — LOW (ref 3.5–5.3)
POTASSIUM SERPL-SCNC: 3.5 MMOL/L — SIGNIFICANT CHANGE UP (ref 3.5–5.3)
POTASSIUM SERPL-SCNC: 3.5 MMOL/L — SIGNIFICANT CHANGE UP (ref 3.5–5.3)
POTASSIUM SERPL-SCNC: 3.6 MMOL/L — SIGNIFICANT CHANGE UP (ref 3.5–5.3)
POTASSIUM SERPL-SCNC: 3.6 MMOL/L — SIGNIFICANT CHANGE UP (ref 3.5–5.3)
POTASSIUM SERPL-SCNC: 3.7 MMOL/L — SIGNIFICANT CHANGE UP (ref 3.5–5.3)
POTASSIUM SERPL-SCNC: 3.8 MMOL/L — SIGNIFICANT CHANGE UP (ref 3.5–5.3)
POTASSIUM SERPL-SCNC: 3.9 MMOL/L — SIGNIFICANT CHANGE UP (ref 3.5–5.3)
POTASSIUM SERPL-SCNC: 3.9 MMOL/L — SIGNIFICANT CHANGE UP (ref 3.5–5.3)
POTASSIUM SERPL-SCNC: 4 MMOL/L — SIGNIFICANT CHANGE UP (ref 3.5–5.3)
POTASSIUM SERPL-SCNC: 4.1 MMOL/L — SIGNIFICANT CHANGE UP (ref 3.5–5.3)
POTASSIUM SERPL-SCNC: 4.3 MMOL/L — SIGNIFICANT CHANGE UP (ref 3.5–5.3)
POTASSIUM SERPL-SCNC: 4.5 MMOL/L — SIGNIFICANT CHANGE UP (ref 3.5–5.3)
POTASSIUM SERPL-SCNC: 4.7 MMOL/L — SIGNIFICANT CHANGE UP (ref 3.5–5.3)
PROCALCITONIN SERPL-MCNC: 0.06 NG/ML — SIGNIFICANT CHANGE UP (ref 0.02–0.1)
PROCALCITONIN SERPL-MCNC: 0.11 NG/ML — HIGH (ref 0.02–0.1)
PROT CSF-MCNC: 174 MG/DL — HIGH (ref 15–45)
PROT CSF-MCNC: 299 MG/DL — HIGH (ref 15–45)
PROT SERPL-MCNC: 6.2 G/DL — SIGNIFICANT CHANGE UP (ref 6–8.3)
PROT SERPL-MCNC: 7.9 G/DL — SIGNIFICANT CHANGE UP (ref 6–8.3)
PROT SERPL-MCNC: 8 G/DL — SIGNIFICANT CHANGE UP (ref 6–8.3)
PROT UR-MCNC: ABNORMAL
PROT UR-MCNC: SIGNIFICANT CHANGE UP
PROTHROM AB SERPL-ACNC: 11.2 SEC — SIGNIFICANT CHANGE UP (ref 10.5–13.4)
PROTHROM AB SERPL-ACNC: 11.5 SEC — SIGNIFICANT CHANGE UP (ref 10.5–13.4)
PROTHROM AB SERPL-ACNC: 13.7 SEC — HIGH (ref 10.5–13.4)
RBC # BLD: 3.46 M/UL — LOW (ref 3.8–5.2)
RBC # BLD: 3.51 M/UL — LOW (ref 3.8–5.2)
RBC # BLD: 3.66 M/UL — LOW (ref 3.8–5.2)
RBC # BLD: 3.73 M/UL — LOW (ref 3.8–5.2)
RBC # BLD: 3.81 M/UL — SIGNIFICANT CHANGE UP (ref 3.8–5.2)
RBC # BLD: 3.83 M/UL — SIGNIFICANT CHANGE UP (ref 3.8–5.2)
RBC # BLD: 3.95 M/UL — SIGNIFICANT CHANGE UP (ref 3.8–5.2)
RBC # BLD: 3.99 M/UL — SIGNIFICANT CHANGE UP (ref 3.8–5.2)
RBC # BLD: 4.03 M/UL — SIGNIFICANT CHANGE UP (ref 3.8–5.2)
RBC # BLD: 4.26 M/UL — SIGNIFICANT CHANGE UP (ref 3.8–5.2)
RBC # BLD: 4.57 M/UL — SIGNIFICANT CHANGE UP (ref 3.8–5.2)
RBC # BLD: 4.72 M/UL — SIGNIFICANT CHANGE UP (ref 3.8–5.2)
RBC # BLD: 4.74 M/UL — SIGNIFICANT CHANGE UP (ref 3.8–5.2)
RBC # BLD: 4.74 M/UL — SIGNIFICANT CHANGE UP (ref 3.8–5.2)
RBC # BLD: 4.75 M/UL — SIGNIFICANT CHANGE UP (ref 3.8–5.2)
RBC # BLD: 4.79 M/UL — SIGNIFICANT CHANGE UP (ref 3.8–5.2)
RBC # BLD: 5.39 M/UL — HIGH (ref 3.8–5.2)
RBC # BLD: 5.82 M/UL — HIGH (ref 3.8–5.2)
RBC # CSF: HIGH /UL (ref 0–0)
RBC # CSF: HIGH /UL (ref 0–0)
RBC # FLD: 14.5 % — SIGNIFICANT CHANGE UP (ref 10.3–14.5)
RBC # FLD: 15.9 % — HIGH (ref 10.3–14.5)
RBC # FLD: 15.9 % — HIGH (ref 10.3–14.5)
RBC # FLD: 16.1 % — HIGH (ref 10.3–14.5)
RBC # FLD: 16.1 % — HIGH (ref 10.3–14.5)
RBC # FLD: 16.2 % — HIGH (ref 10.3–14.5)
RBC # FLD: 16.3 % — HIGH (ref 10.3–14.5)
RBC # FLD: 17.1 % — HIGH (ref 10.3–14.5)
RBC # FLD: 20.9 % — HIGH (ref 10.3–14.5)
RBC # FLD: 21.4 % — HIGH (ref 10.3–14.5)
RBC # FLD: 21.5 % — HIGH (ref 10.3–14.5)
RBC # FLD: 21.6 % — HIGH (ref 10.3–14.5)
RBC # FLD: 22.1 % — HIGH (ref 10.3–14.5)
RBC # FLD: 22.4 % — HIGH (ref 10.3–14.5)
RBC # FLD: 22.8 % — HIGH (ref 10.3–14.5)
RBC BLD AUTO: ABNORMAL
RBC CASTS # UR COMP ASSIST: 14 /HPF — HIGH (ref 0–4)
RBC CASTS # UR COMP ASSIST: 318 /HPF — HIGH (ref 0–4)
RBC CASTS # UR COMP ASSIST: 40 /HPF — HIGH (ref 0–4)
RBC CASTS # UR COMP ASSIST: 6 /HPF — HIGH (ref 0–4)
RBC CASTS # UR COMP ASSIST: 751 /HPF — HIGH (ref 0–4)
RH IG SCN BLD-IMP: POSITIVE — SIGNIFICANT CHANGE UP
RSV RNA NPH QL NAA+NON-PROBE: SIGNIFICANT CHANGE UP
S AUREUS DNA NOSE QL NAA+PROBE: DETECTED
S AUREUS DNA NOSE QL NAA+PROBE: SIGNIFICANT CHANGE UP
SAO2 % BLDV: 83.6 % — SIGNIFICANT CHANGE UP
SAO2 % BLDV: 96.9 % — SIGNIFICANT CHANGE UP
SARS-COV-2 RNA SPEC QL NAA+PROBE: SIGNIFICANT CHANGE UP
SCHISTOCYTES BLD QL AUTO: SLIGHT — SIGNIFICANT CHANGE UP
SMUDGE CELLS # BLD: PRESENT — SIGNIFICANT CHANGE UP
SODIUM SERPL-SCNC: 134 MMOL/L — LOW (ref 135–145)
SODIUM SERPL-SCNC: 135 MMOL/L — SIGNIFICANT CHANGE UP (ref 135–145)
SODIUM SERPL-SCNC: 137 MMOL/L — SIGNIFICANT CHANGE UP (ref 135–145)
SODIUM SERPL-SCNC: 138 MMOL/L — SIGNIFICANT CHANGE UP (ref 135–145)
SODIUM SERPL-SCNC: 139 MMOL/L — SIGNIFICANT CHANGE UP (ref 135–145)
SODIUM SERPL-SCNC: 141 MMOL/L — SIGNIFICANT CHANGE UP (ref 135–145)
SODIUM SERPL-SCNC: 145 MMOL/L — SIGNIFICANT CHANGE UP (ref 135–145)
SODIUM SERPL-SCNC: 149 MMOL/L — HIGH (ref 135–145)
SODIUM SERPL-SCNC: 149 MMOL/L — HIGH (ref 135–145)
SODIUM SERPL-SCNC: 150 MMOL/L — HIGH (ref 135–145)
SODIUM SERPL-SCNC: 150 MMOL/L — HIGH (ref 135–145)
SODIUM SERPL-SCNC: 151 MMOL/L — HIGH (ref 135–145)
SODIUM SERPL-SCNC: 154 MMOL/L — HIGH (ref 135–145)
SP GR SPEC: 1.02 — SIGNIFICANT CHANGE UP (ref 1.01–1.02)
SP GR SPEC: 1.03 — HIGH (ref 1.01–1.02)
SP GR SPEC: 1.03 — HIGH (ref 1.01–1.02)
SPECIMEN SOURCE: SIGNIFICANT CHANGE UP
TARGETS BLD QL SMEAR: SLIGHT — SIGNIFICANT CHANGE UP
TIBC SERPL-MCNC: 243 UG/DL — SIGNIFICANT CHANGE UP (ref 220–430)
TRIGL SERPL-MCNC: 206 MG/DL — HIGH
TROPONIN T, HIGH SENSITIVITY RESULT: <6 NG/L — SIGNIFICANT CHANGE UP
TSH SERPL-MCNC: 1.83 UIU/ML — SIGNIFICANT CHANGE UP (ref 0.27–4.2)
TUBE TYPE: SIGNIFICANT CHANGE UP
TUBE TYPE: SIGNIFICANT CHANGE UP
UIBC SERPL-MCNC: 210 UG/DL — SIGNIFICANT CHANGE UP (ref 110–370)
UROBILINOGEN FLD QL: ABNORMAL
UROBILINOGEN FLD QL: ABNORMAL
UROBILINOGEN FLD QL: NEGATIVE — SIGNIFICANT CHANGE UP
VARIANT LYMPHS # BLD: 29.4 % — HIGH (ref 0–6)
WBC # BLD: 10.79 K/UL — HIGH (ref 3.8–10.5)
WBC # BLD: 11.12 K/UL — HIGH (ref 3.8–10.5)
WBC # BLD: 11.18 K/UL — HIGH (ref 3.8–10.5)
WBC # BLD: 12.57 K/UL — HIGH (ref 3.8–10.5)
WBC # BLD: 13.38 K/UL — HIGH (ref 3.8–10.5)
WBC # BLD: 14.04 K/UL — HIGH (ref 3.8–10.5)
WBC # BLD: 14.23 K/UL — HIGH (ref 3.8–10.5)
WBC # BLD: 14.59 K/UL — HIGH (ref 3.8–10.5)
WBC # BLD: 15.38 K/UL — HIGH (ref 3.8–10.5)
WBC # BLD: 18 K/UL — HIGH (ref 3.8–10.5)
WBC # BLD: 7.28 K/UL — SIGNIFICANT CHANGE UP (ref 3.8–10.5)
WBC # BLD: 7.4 K/UL — SIGNIFICANT CHANGE UP (ref 3.8–10.5)
WBC # BLD: 8.12 K/UL — SIGNIFICANT CHANGE UP (ref 3.8–10.5)
WBC # BLD: 8.14 K/UL — SIGNIFICANT CHANGE UP (ref 3.8–10.5)
WBC # BLD: 8.34 K/UL — SIGNIFICANT CHANGE UP (ref 3.8–10.5)
WBC # BLD: 9.23 K/UL — SIGNIFICANT CHANGE UP (ref 3.8–10.5)
WBC # BLD: 9.36 K/UL — SIGNIFICANT CHANGE UP (ref 3.8–10.5)
WBC # BLD: 9.89 K/UL — SIGNIFICANT CHANGE UP (ref 3.8–10.5)
WBC # FLD AUTO: 10.79 K/UL — HIGH (ref 3.8–10.5)
WBC # FLD AUTO: 11.12 K/UL — HIGH (ref 3.8–10.5)
WBC # FLD AUTO: 11.18 K/UL — HIGH (ref 3.8–10.5)
WBC # FLD AUTO: 12.57 K/UL — HIGH (ref 3.8–10.5)
WBC # FLD AUTO: 13.38 K/UL — HIGH (ref 3.8–10.5)
WBC # FLD AUTO: 14.04 K/UL — HIGH (ref 3.8–10.5)
WBC # FLD AUTO: 14.23 K/UL — HIGH (ref 3.8–10.5)
WBC # FLD AUTO: 14.59 K/UL — HIGH (ref 3.8–10.5)
WBC # FLD AUTO: 15.38 K/UL — HIGH (ref 3.8–10.5)
WBC # FLD AUTO: 18 K/UL — HIGH (ref 3.8–10.5)
WBC # FLD AUTO: 7.28 K/UL — SIGNIFICANT CHANGE UP (ref 3.8–10.5)
WBC # FLD AUTO: 7.4 K/UL — SIGNIFICANT CHANGE UP (ref 3.8–10.5)
WBC # FLD AUTO: 8.12 K/UL — SIGNIFICANT CHANGE UP (ref 3.8–10.5)
WBC # FLD AUTO: 8.14 K/UL — SIGNIFICANT CHANGE UP (ref 3.8–10.5)
WBC # FLD AUTO: 8.34 K/UL — SIGNIFICANT CHANGE UP (ref 3.8–10.5)
WBC # FLD AUTO: 9.23 K/UL — SIGNIFICANT CHANGE UP (ref 3.8–10.5)
WBC # FLD AUTO: 9.36 K/UL — SIGNIFICANT CHANGE UP (ref 3.8–10.5)
WBC # FLD AUTO: 9.89 K/UL — SIGNIFICANT CHANGE UP (ref 3.8–10.5)
WBC UR QL: 1 /HPF — SIGNIFICANT CHANGE UP (ref 0–5)
WBC UR QL: 2 /HPF — SIGNIFICANT CHANGE UP (ref 0–5)
WBC UR QL: 278 /HPF — HIGH (ref 0–5)
WBC UR QL: 6 /HPF — HIGH (ref 0–5)
WBC UR QL: 6 /HPF — HIGH (ref 0–5)

## 2022-01-01 PROCEDURE — 87086 URINE CULTURE/COLONY COUNT: CPT

## 2022-01-01 PROCEDURE — 99291 CRITICAL CARE FIRST HOUR: CPT

## 2022-01-01 PROCEDURE — 80061 LIPID PANEL: CPT

## 2022-01-01 PROCEDURE — 70450 CT HEAD/BRAIN W/O DYE: CPT | Mod: 26,77

## 2022-01-01 PROCEDURE — 71045 X-RAY EXAM CHEST 1 VIEW: CPT | Mod: 26

## 2022-01-01 PROCEDURE — 86900 BLOOD TYPING SEROLOGIC ABO: CPT

## 2022-01-01 PROCEDURE — 70450 CT HEAD/BRAIN W/O DYE: CPT | Mod: 26,MA,59

## 2022-01-01 PROCEDURE — 70450 CT HEAD/BRAIN W/O DYE: CPT | Mod: 26

## 2022-01-01 PROCEDURE — 62200 ESTABLISH BRAIN CAVITY SHUNT: CPT

## 2022-01-01 PROCEDURE — 84157 ASSAY OF PROTEIN OTHER: CPT

## 2022-01-01 PROCEDURE — 84100 ASSAY OF PHOSPHORUS: CPT

## 2022-01-01 PROCEDURE — 61107 TDH PNXR IMPLT VENTR CATH: CPT

## 2022-01-01 PROCEDURE — 76376 3D RENDER W/INTRP POSTPROCES: CPT | Mod: 26

## 2022-01-01 PROCEDURE — 99222 1ST HOSP IP/OBS MODERATE 55: CPT

## 2022-01-01 PROCEDURE — 70553 MRI BRAIN STEM W/O & W/DYE: CPT | Mod: 26

## 2022-01-01 PROCEDURE — 81001 URINALYSIS AUTO W/SCOPE: CPT

## 2022-01-01 PROCEDURE — 82330 ASSAY OF CALCIUM: CPT

## 2022-01-01 PROCEDURE — 71045 X-RAY EXAM CHEST 1 VIEW: CPT | Mod: 26,77

## 2022-01-01 PROCEDURE — 87640 STAPH A DNA AMP PROBE: CPT

## 2022-01-01 PROCEDURE — 94002 VENT MGMT INPAT INIT DAY: CPT

## 2022-01-01 PROCEDURE — 70498 CT ANGIOGRAPHY NECK: CPT | Mod: 26,MA

## 2022-01-01 PROCEDURE — 87186 SC STD MICRODIL/AGAR DIL: CPT

## 2022-01-01 PROCEDURE — 70496 CT ANGIOGRAPHY HEAD: CPT | Mod: 26,MA

## 2022-01-01 PROCEDURE — 86901 BLOOD TYPING SEROLOGIC RH(D): CPT

## 2022-01-01 PROCEDURE — 87070 CULTURE OTHR SPECIMN AEROBIC: CPT

## 2022-01-01 PROCEDURE — 99292 CRITICAL CARE ADDL 30 MIN: CPT

## 2022-01-01 PROCEDURE — 36620 INSERTION CATHETER ARTERY: CPT

## 2022-01-01 PROCEDURE — 93306 TTE W/DOPPLER COMPLETE: CPT | Mod: 26

## 2022-01-01 PROCEDURE — A9585: CPT

## 2022-01-01 PROCEDURE — 36430 TRANSFUSION BLD/BLD COMPNT: CPT

## 2022-01-01 PROCEDURE — 82947 ASSAY GLUCOSE BLOOD QUANT: CPT

## 2022-01-01 PROCEDURE — 87077 CULTURE AEROBIC IDENTIFY: CPT

## 2022-01-01 PROCEDURE — 97760 ORTHOTIC MGMT&TRAING 1ST ENC: CPT

## 2022-01-01 PROCEDURE — 93010 ELECTROCARDIOGRAM REPORT: CPT

## 2022-01-01 PROCEDURE — 99233 SBSQ HOSP IP/OBS HIGH 50: CPT

## 2022-01-01 PROCEDURE — 93308 TTE F-UP OR LMTD: CPT | Mod: 26,GC

## 2022-01-01 PROCEDURE — 71045 X-RAY EXAM CHEST 1 VIEW: CPT

## 2022-01-01 PROCEDURE — 83550 IRON BINDING TEST: CPT

## 2022-01-01 PROCEDURE — 82962 GLUCOSE BLOOD TEST: CPT

## 2022-01-01 PROCEDURE — 85610 PROTHROMBIN TIME: CPT

## 2022-01-01 PROCEDURE — 85018 HEMOGLOBIN: CPT

## 2022-01-01 PROCEDURE — 84132 ASSAY OF SERUM POTASSIUM: CPT

## 2022-01-01 PROCEDURE — 99223 1ST HOSP IP/OBS HIGH 75: CPT | Mod: 57

## 2022-01-01 PROCEDURE — 99291 CRITICAL CARE FIRST HOUR: CPT | Mod: 25

## 2022-01-01 PROCEDURE — 93306 TTE W/DOPPLER COMPLETE: CPT

## 2022-01-01 PROCEDURE — 95718 EEG PHYS/QHP 2-12 HR W/VEEG: CPT

## 2022-01-01 PROCEDURE — 93970 EXTREMITY STUDY: CPT | Mod: 26

## 2022-01-01 PROCEDURE — 82803 BLOOD GASES ANY COMBINATION: CPT

## 2022-01-01 PROCEDURE — 83036 HEMOGLOBIN GLYCOSYLATED A1C: CPT

## 2022-01-01 PROCEDURE — 83540 ASSAY OF IRON: CPT

## 2022-01-01 PROCEDURE — 95700 EEG CONT REC W/VID EEG TECH: CPT

## 2022-01-01 PROCEDURE — 84145 PROCALCITONIN (PCT): CPT

## 2022-01-01 PROCEDURE — 84295 ASSAY OF SERUM SODIUM: CPT

## 2022-01-01 PROCEDURE — 85027 COMPLETE CBC AUTOMATED: CPT

## 2022-01-01 PROCEDURE — 99291 CRITICAL CARE FIRST HOUR: CPT | Mod: GC,25

## 2022-01-01 PROCEDURE — 85576 BLOOD PLATELET AGGREGATION: CPT

## 2022-01-01 PROCEDURE — 87641 MR-STAPH DNA AMP PROBE: CPT

## 2022-01-01 PROCEDURE — P9016: CPT

## 2022-01-01 PROCEDURE — 70553 MRI BRAIN STEM W/O & W/DYE: CPT

## 2022-01-01 PROCEDURE — 76376 3D RENDER W/INTRP POSTPROCES: CPT

## 2022-01-01 PROCEDURE — 99291 CRITICAL CARE FIRST HOUR: CPT | Mod: GC

## 2022-01-01 PROCEDURE — 93005 ELECTROCARDIOGRAM TRACING: CPT

## 2022-01-01 PROCEDURE — 86850 RBC ANTIBODY SCREEN: CPT

## 2022-01-01 PROCEDURE — 76604 US EXAM CHEST: CPT | Mod: 26,GC

## 2022-01-01 PROCEDURE — 87205 SMEAR GRAM STAIN: CPT

## 2022-01-01 PROCEDURE — 86923 COMPATIBILITY TEST ELECTRIC: CPT

## 2022-01-01 PROCEDURE — 95711 VEEG 2-12 HR UNMONITORED: CPT

## 2022-01-01 PROCEDURE — 82435 ASSAY OF BLOOD CHLORIDE: CPT

## 2022-01-01 PROCEDURE — 36415 COLL VENOUS BLD VENIPUNCTURE: CPT

## 2022-01-01 PROCEDURE — 70450 CT HEAD/BRAIN W/O DYE: CPT

## 2022-01-01 PROCEDURE — 94799 UNLISTED PULMONARY SVC/PX: CPT

## 2022-01-01 PROCEDURE — 31500 INSERT EMERGENCY AIRWAY: CPT

## 2022-01-01 PROCEDURE — 71045 X-RAY EXAM CHEST 1 VIEW: CPT | Mod: 26,76

## 2022-01-01 PROCEDURE — 99223 1ST HOSP IP/OBS HIGH 75: CPT

## 2022-01-01 PROCEDURE — 80048 BASIC METABOLIC PNL TOTAL CA: CPT

## 2022-01-01 PROCEDURE — 83735 ASSAY OF MAGNESIUM: CPT

## 2022-01-01 PROCEDURE — 93970 EXTREMITY STUDY: CPT

## 2022-01-01 PROCEDURE — 94003 VENT MGMT INPAT SUBQ DAY: CPT

## 2022-01-01 PROCEDURE — 82945 GLUCOSE OTHER FLUID: CPT

## 2022-01-01 PROCEDURE — 82728 ASSAY OF FERRITIN: CPT

## 2022-01-01 PROCEDURE — 83605 ASSAY OF LACTIC ACID: CPT

## 2022-01-01 PROCEDURE — 87040 BLOOD CULTURE FOR BACTERIA: CPT

## 2022-01-01 PROCEDURE — 89051 BODY FLUID CELL COUNT: CPT

## 2022-01-01 PROCEDURE — 85730 THROMBOPLASTIN TIME PARTIAL: CPT

## 2022-01-01 PROCEDURE — 85014 HEMATOCRIT: CPT

## 2022-01-01 PROCEDURE — 99291 CRITICAL CARE FIRST HOUR: CPT | Mod: 24

## 2022-01-01 RX ORDER — SODIUM CHLORIDE 9 MG/ML
1000 INJECTION, SOLUTION INTRAVENOUS
Refills: 0 | Status: DISCONTINUED | OUTPATIENT
Start: 2022-01-01 | End: 2022-01-01

## 2022-01-01 RX ORDER — SODIUM CHLORIDE 9 MG/ML
1000 INJECTION, SOLUTION INTRAVENOUS
Qty: 0 | Refills: 0 | DISCHARGE
Start: 2022-01-01

## 2022-01-01 RX ORDER — LABETALOL HCL 100 MG
100 TABLET ORAL EVERY 8 HOURS
Refills: 0 | Status: DISCONTINUED | OUTPATIENT
Start: 2022-01-01 | End: 2022-01-01

## 2022-01-01 RX ORDER — NICARDIPINE HYDROCHLORIDE 30 MG/1
0 CAPSULE, EXTENDED RELEASE ORAL
Qty: 0 | Refills: 0 | DISCHARGE
Start: 2022-01-01

## 2022-01-01 RX ORDER — CEFAZOLIN SODIUM 1 G
2000 VIAL (EA) INJECTION EVERY 8 HOURS
Refills: 0 | Status: DISCONTINUED | OUTPATIENT
Start: 2022-01-01 | End: 2022-01-01

## 2022-01-01 RX ORDER — CHLORHEXIDINE GLUCONATE 213 G/1000ML
1 SOLUTION TOPICAL
Refills: 0 | Status: DISCONTINUED | OUTPATIENT
Start: 2022-01-01 | End: 2022-01-01

## 2022-01-01 RX ORDER — PHENYLEPHRINE HYDROCHLORIDE 10 MG/ML
0.1 INJECTION INTRAVENOUS
Qty: 40 | Refills: 0 | Status: DISCONTINUED | OUTPATIENT
Start: 2022-01-01 | End: 2022-01-01

## 2022-01-01 RX ORDER — HYDRALAZINE HCL 50 MG
10 TABLET ORAL ONCE
Refills: 0 | Status: COMPLETED | OUTPATIENT
Start: 2022-01-01 | End: 2022-01-01

## 2022-01-01 RX ORDER — DEXTROSE 50 % IN WATER 50 %
50 SYRINGE (ML) INTRAVENOUS
Qty: 0 | Refills: 0 | DISCHARGE
Start: 2022-01-01

## 2022-01-01 RX ORDER — PHENYLEPHRINE HYDROCHLORIDE 10 MG/ML
0 INJECTION INTRAVENOUS
Qty: 0 | Refills: 0 | DISCHARGE
Start: 2022-01-01

## 2022-01-01 RX ORDER — PANTOPRAZOLE SODIUM 20 MG/1
40 TABLET, DELAYED RELEASE ORAL DAILY
Refills: 0 | Status: DISCONTINUED | OUTPATIENT
Start: 2022-01-01 | End: 2022-01-01

## 2022-01-01 RX ORDER — LOSARTAN POTASSIUM 100 MG/1
1 TABLET, FILM COATED ORAL
Qty: 0 | Refills: 0 | DISCHARGE

## 2022-01-01 RX ORDER — FENTANYL CITRATE 50 UG/ML
1 INJECTION INTRAVENOUS
Qty: 2500 | Refills: 0 | Status: DISCONTINUED | OUTPATIENT
Start: 2022-01-01 | End: 2022-01-01

## 2022-01-01 RX ORDER — SODIUM CHLORIDE 9 MG/ML
1000 INJECTION INTRAMUSCULAR; INTRAVENOUS; SUBCUTANEOUS ONCE
Refills: 0 | Status: COMPLETED | OUTPATIENT
Start: 2022-01-01 | End: 2022-01-01

## 2022-01-01 RX ORDER — AMLODIPINE BESYLATE 2.5 MG/1
5 TABLET ORAL AT BEDTIME
Refills: 0 | Status: DISCONTINUED | OUTPATIENT
Start: 2022-01-01 | End: 2022-01-01

## 2022-01-01 RX ORDER — POTASSIUM CHLORIDE 20 MEQ
40 PACKET (EA) ORAL ONCE
Refills: 0 | Status: COMPLETED | OUTPATIENT
Start: 2022-01-01 | End: 2022-01-01

## 2022-01-01 RX ORDER — HYDROMORPHONE HYDROCHLORIDE 2 MG/ML
2 INJECTION INTRAMUSCULAR; INTRAVENOUS; SUBCUTANEOUS ONCE
Refills: 0 | Status: DISCONTINUED | OUTPATIENT
Start: 2022-01-01 | End: 2022-01-01

## 2022-01-01 RX ORDER — SUCCINYLCHOLINE CHLORIDE 100 MG/5ML
100 SYRINGE (ML) INTRAVENOUS ONCE
Refills: 0 | Status: COMPLETED | OUTPATIENT
Start: 2022-01-01 | End: 2022-01-01

## 2022-01-01 RX ORDER — CEFAZOLIN SODIUM 1 G
2000 VIAL (EA) INJECTION
Qty: 0 | Refills: 0 | DISCHARGE
Start: 2022-01-01

## 2022-01-01 RX ORDER — SODIUM CHLORIDE 9 MG/ML
500 INJECTION, SOLUTION INTRAVENOUS ONCE
Refills: 0 | Status: DISCONTINUED | OUTPATIENT
Start: 2022-01-01 | End: 2022-01-01

## 2022-01-01 RX ORDER — SITAGLIPTIN AND METFORMIN HYDROCHLORIDE 500; 50 MG/1; MG/1
1 TABLET, FILM COATED ORAL
Qty: 0 | Refills: 0 | DISCHARGE

## 2022-01-01 RX ORDER — FENTANYL CITRATE 50 UG/ML
0.5 INJECTION INTRAVENOUS
Qty: 2500 | Refills: 0 | Status: DISCONTINUED | OUTPATIENT
Start: 2022-01-01 | End: 2022-01-01

## 2022-01-01 RX ORDER — LORATADINE 10 MG/1
1 TABLET ORAL
Qty: 0 | Refills: 0 | DISCHARGE

## 2022-01-01 RX ORDER — LEVETIRACETAM 250 MG/1
500 TABLET, FILM COATED ORAL
Refills: 0 | Status: DISCONTINUED | OUTPATIENT
Start: 2022-01-01 | End: 2022-01-01

## 2022-01-01 RX ORDER — FENTANYL CITRATE 50 UG/ML
50 INJECTION INTRAVENOUS ONCE
Refills: 0 | Status: DISCONTINUED | OUTPATIENT
Start: 2022-01-01 | End: 2022-01-01

## 2022-01-01 RX ORDER — DEXTROSE 50 % IN WATER 50 %
12.5 SYRINGE (ML) INTRAVENOUS ONCE
Refills: 0 | Status: DISCONTINUED | OUTPATIENT
Start: 2022-01-01 | End: 2022-01-01

## 2022-01-01 RX ORDER — BROMOCRIPTINE MESYLATE 5 MG/1
5 CAPSULE ORAL EVERY 8 HOURS
Refills: 0 | Status: DISCONTINUED | OUTPATIENT
Start: 2022-01-01 | End: 2022-01-01

## 2022-01-01 RX ORDER — PROPOFOL 10 MG/ML
35 INJECTION, EMULSION INTRAVENOUS
Qty: 1000 | Refills: 0 | Status: DISCONTINUED | OUTPATIENT
Start: 2022-01-01 | End: 2022-01-01

## 2022-01-01 RX ORDER — NOREPINEPHRINE BITARTRATE/D5W 8 MG/250ML
0.05 PLASTIC BAG, INJECTION (ML) INTRAVENOUS
Qty: 8 | Refills: 0 | Status: DISCONTINUED | OUTPATIENT
Start: 2022-01-01 | End: 2022-01-01

## 2022-01-01 RX ORDER — INSULIN LISPRO 100/ML
VIAL (ML) SUBCUTANEOUS EVERY 6 HOURS
Refills: 0 | Status: DISCONTINUED | OUTPATIENT
Start: 2022-01-01 | End: 2022-01-01

## 2022-01-01 RX ORDER — CEFTRIAXONE 500 MG/1
1000 INJECTION, POWDER, FOR SOLUTION INTRAMUSCULAR; INTRAVENOUS EVERY 24 HOURS
Refills: 0 | Status: DISCONTINUED | OUTPATIENT
Start: 2022-01-01 | End: 2022-01-01

## 2022-01-01 RX ORDER — SENNA PLUS 8.6 MG/1
10 TABLET ORAL AT BEDTIME
Refills: 0 | Status: DISCONTINUED | OUTPATIENT
Start: 2022-01-01 | End: 2022-01-01

## 2022-01-01 RX ORDER — DEXTROSE 50 % IN WATER 50 %
25 SYRINGE (ML) INTRAVENOUS ONCE
Refills: 0 | Status: DISCONTINUED | OUTPATIENT
Start: 2022-01-01 | End: 2022-01-01

## 2022-01-01 RX ORDER — POLYETHYLENE GLYCOL 3350 17 G/17G
17 POWDER, FOR SOLUTION ORAL EVERY 12 HOURS
Refills: 0 | Status: DISCONTINUED | OUTPATIENT
Start: 2022-01-01 | End: 2022-01-01

## 2022-01-01 RX ORDER — SODIUM CHLORIDE 9 MG/ML
1000 INJECTION, SOLUTION INTRAVENOUS ONCE
Refills: 0 | Status: COMPLETED | OUTPATIENT
Start: 2022-01-01 | End: 2022-01-01

## 2022-01-01 RX ORDER — CEFAZOLIN SODIUM 1 G
2000 VIAL (EA) INJECTION ONCE
Refills: 0 | Status: COMPLETED | OUTPATIENT
Start: 2022-01-01 | End: 2022-01-01

## 2022-01-01 RX ORDER — PHENYLEPHRINE HYDROCHLORIDE 10 MG/ML
0.3 INJECTION INTRAVENOUS
Qty: 40 | Refills: 0 | Status: DISCONTINUED | OUTPATIENT
Start: 2022-01-01 | End: 2022-01-01

## 2022-01-01 RX ORDER — LABETALOL HCL 100 MG
5 TABLET ORAL ONCE
Refills: 0 | Status: COMPLETED | OUTPATIENT
Start: 2022-01-01 | End: 2022-01-01

## 2022-01-01 RX ORDER — SODIUM CHLORIDE 5 G/100ML
1000 INJECTION, SOLUTION INTRAVENOUS
Refills: 0 | Status: DISCONTINUED | OUTPATIENT
Start: 2022-01-01 | End: 2022-01-01

## 2022-01-01 RX ORDER — LOSARTAN POTASSIUM 100 MG/1
100 TABLET, FILM COATED ORAL DAILY
Refills: 0 | Status: DISCONTINUED | OUTPATIENT
Start: 2022-01-01 | End: 2022-01-01

## 2022-01-01 RX ORDER — HUMAN INSULIN 100 [IU]/ML
8 INJECTION, SUSPENSION SUBCUTANEOUS EVERY 6 HOURS
Refills: 0 | Status: DISCONTINUED | OUTPATIENT
Start: 2022-01-01 | End: 2022-01-01

## 2022-01-01 RX ORDER — FENTANYL CITRATE 50 UG/ML
12.5 INJECTION INTRAVENOUS ONCE
Refills: 0 | Status: DISCONTINUED | OUTPATIENT
Start: 2022-01-01 | End: 2022-01-01

## 2022-01-01 RX ORDER — CHLORHEXIDINE GLUCONATE 213 G/1000ML
15 SOLUTION TOPICAL
Qty: 0 | Refills: 0 | DISCHARGE
Start: 2022-01-01

## 2022-01-01 RX ORDER — DEXTROSE 50 % IN WATER 50 %
15 SYRINGE (ML) INTRAVENOUS ONCE
Refills: 0 | Status: DISCONTINUED | OUTPATIENT
Start: 2022-01-01 | End: 2022-01-01

## 2022-01-01 RX ORDER — ACETAMINOPHEN 500 MG
650 TABLET ORAL EVERY 6 HOURS
Refills: 0 | Status: DISCONTINUED | OUTPATIENT
Start: 2022-01-01 | End: 2022-01-01

## 2022-01-01 RX ORDER — ENOXAPARIN SODIUM 100 MG/ML
40 INJECTION SUBCUTANEOUS EVERY 24 HOURS
Refills: 0 | Status: DISCONTINUED | OUTPATIENT
Start: 2022-01-01 | End: 2022-01-01

## 2022-01-01 RX ORDER — LEVOTHYROXINE SODIUM 125 MCG
12.5 TABLET ORAL AT BEDTIME
Refills: 0 | Status: DISCONTINUED | OUTPATIENT
Start: 2022-01-01 | End: 2022-01-01

## 2022-01-01 RX ORDER — PHENYLEPHRINE HYDROCHLORIDE 10 MG/ML
40 INJECTION INTRAVENOUS
Qty: 0 | Refills: 0 | DISCHARGE
Start: 2022-01-01

## 2022-01-01 RX ORDER — ACETAMINOPHEN 500 MG
1000 TABLET ORAL ONCE
Refills: 0 | Status: COMPLETED | OUTPATIENT
Start: 2022-01-01 | End: 2022-01-01

## 2022-01-01 RX ORDER — TRAZODONE HCL 50 MG
1 TABLET ORAL
Qty: 0 | Refills: 0 | DISCHARGE

## 2022-01-01 RX ORDER — FENTANYL CITRATE 50 UG/ML
25 INJECTION INTRAVENOUS EVERY 4 HOURS
Refills: 0 | Status: DISCONTINUED | OUTPATIENT
Start: 2022-01-01 | End: 2022-01-01

## 2022-01-01 RX ORDER — POTASSIUM CHLORIDE 20 MEQ
30 PACKET (EA) ORAL EVERY 4 HOURS
Refills: 0 | Status: COMPLETED | OUTPATIENT
Start: 2022-01-01 | End: 2022-01-01

## 2022-01-01 RX ORDER — SODIUM CHLORIDE 5 G/100ML
500 INJECTION, SOLUTION INTRAVENOUS
Refills: 0 | Status: DISCONTINUED | OUTPATIENT
Start: 2022-01-01 | End: 2022-01-01

## 2022-01-01 RX ORDER — LEVETIRACETAM 250 MG/1
1000 TABLET, FILM COATED ORAL ONCE
Refills: 0 | Status: COMPLETED | OUTPATIENT
Start: 2022-01-01 | End: 2022-01-01

## 2022-01-01 RX ORDER — HUMAN INSULIN 100 [IU]/ML
14 INJECTION, SUSPENSION SUBCUTANEOUS EVERY 6 HOURS
Refills: 0 | Status: DISCONTINUED | OUTPATIENT
Start: 2022-01-01 | End: 2022-01-01

## 2022-01-01 RX ORDER — ACETAMINOPHEN 500 MG
650 TABLET ORAL ONCE
Refills: 0 | Status: COMPLETED | OUTPATIENT
Start: 2022-01-01 | End: 2022-01-01

## 2022-01-01 RX ORDER — LOSARTAN POTASSIUM 100 MG/1
50 TABLET, FILM COATED ORAL DAILY
Refills: 0 | Status: DISCONTINUED | OUTPATIENT
Start: 2022-01-01 | End: 2022-01-01

## 2022-01-01 RX ORDER — FENTANYL CITRATE 50 UG/ML
2500 INJECTION INTRAVENOUS
Qty: 0 | Refills: 0 | DISCHARGE
Start: 2022-01-01

## 2022-01-01 RX ORDER — HUMAN INSULIN 100 [IU]/ML
9 INJECTION, SUSPENSION SUBCUTANEOUS ONCE
Refills: 0 | Status: COMPLETED | OUTPATIENT
Start: 2022-01-01 | End: 2022-01-01

## 2022-01-01 RX ORDER — INSULIN LISPRO 100/ML
0 VIAL (ML) SUBCUTANEOUS
Qty: 0 | Refills: 0 | DISCHARGE
Start: 2022-01-01

## 2022-01-01 RX ORDER — SODIUM CHLORIDE 9 MG/ML
500 INJECTION INTRAMUSCULAR; INTRAVENOUS; SUBCUTANEOUS ONCE
Refills: 0 | Status: COMPLETED | OUTPATIENT
Start: 2022-01-01 | End: 2022-01-01

## 2022-01-01 RX ORDER — MECLIZINE HCL 12.5 MG
1 TABLET ORAL
Qty: 0 | Refills: 0 | DISCHARGE

## 2022-01-01 RX ORDER — MAGNESIUM SULFATE 500 MG/ML
2 VIAL (ML) INJECTION ONCE
Refills: 0 | Status: COMPLETED | OUTPATIENT
Start: 2022-01-01 | End: 2022-01-01

## 2022-01-01 RX ORDER — LEVOTHYROXINE SODIUM 125 MCG
25 TABLET ORAL DAILY
Refills: 0 | Status: DISCONTINUED | OUTPATIENT
Start: 2022-01-01 | End: 2022-01-01

## 2022-01-01 RX ORDER — HYDRALAZINE HCL 50 MG
5 TABLET ORAL ONCE
Refills: 0 | Status: COMPLETED | OUTPATIENT
Start: 2022-01-01 | End: 2022-01-01

## 2022-01-01 RX ORDER — POTASSIUM PHOSPHATE, MONOBASIC POTASSIUM PHOSPHATE, DIBASIC 236; 224 MG/ML; MG/ML
30 INJECTION, SOLUTION INTRAVENOUS ONCE
Refills: 0 | Status: COMPLETED | OUTPATIENT
Start: 2022-01-01 | End: 2022-01-01

## 2022-01-01 RX ORDER — AMLODIPINE BESYLATE 2.5 MG/1
10 TABLET ORAL DAILY
Refills: 0 | Status: DISCONTINUED | OUTPATIENT
Start: 2022-01-01 | End: 2022-01-01

## 2022-01-01 RX ORDER — HUMAN INSULIN 100 [IU]/ML
20 INJECTION, SUSPENSION SUBCUTANEOUS EVERY 6 HOURS
Refills: 0 | Status: DISCONTINUED | OUTPATIENT
Start: 2022-01-01 | End: 2022-01-01

## 2022-01-01 RX ORDER — POTASSIUM CHLORIDE 20 MEQ
20 PACKET (EA) ORAL ONCE
Refills: 0 | Status: DISCONTINUED | OUTPATIENT
Start: 2022-01-01 | End: 2022-01-01

## 2022-01-01 RX ORDER — HUMAN INSULIN 100 [IU]/ML
10 INJECTION, SUSPENSION SUBCUTANEOUS EVERY 6 HOURS
Refills: 0 | Status: DISCONTINUED | OUTPATIENT
Start: 2022-01-01 | End: 2022-01-01

## 2022-01-01 RX ORDER — LEVOTHYROXINE SODIUM 125 MCG
1 TABLET ORAL
Qty: 0 | Refills: 0 | DISCHARGE

## 2022-01-01 RX ORDER — FENTANYL CITRATE 50 UG/ML
1.5 INJECTION INTRAVENOUS
Qty: 5000 | Refills: 0 | Status: DISCONTINUED | OUTPATIENT
Start: 2022-01-01 | End: 2022-01-01

## 2022-01-01 RX ORDER — ACETAMINOPHEN 500 MG
975 TABLET ORAL ONCE
Refills: 0 | Status: COMPLETED | OUTPATIENT
Start: 2022-01-01 | End: 2022-01-01

## 2022-01-01 RX ORDER — LEVETIRACETAM 250 MG/1
500 TABLET, FILM COATED ORAL EVERY 12 HOURS
Refills: 0 | Status: DISCONTINUED | OUTPATIENT
Start: 2022-01-01 | End: 2022-01-01

## 2022-01-01 RX ORDER — GLUCAGON INJECTION, SOLUTION 0.5 MG/.1ML
1 INJECTION, SOLUTION SUBCUTANEOUS ONCE
Refills: 0 | Status: DISCONTINUED | OUTPATIENT
Start: 2022-01-01 | End: 2022-01-01

## 2022-01-01 RX ORDER — CHLORHEXIDINE GLUCONATE 213 G/1000ML
15 SOLUTION TOPICAL EVERY 12 HOURS
Refills: 0 | Status: DISCONTINUED | OUTPATIENT
Start: 2022-01-01 | End: 2022-01-01

## 2022-01-01 RX ORDER — LEVOTHYROXINE SODIUM 125 MCG
12.5 TABLET ORAL
Qty: 0 | Refills: 0 | DISCHARGE
Start: 2022-01-01

## 2022-01-01 RX ORDER — MORPHINE SULFATE 50 MG/1
2 CAPSULE, EXTENDED RELEASE ORAL EVERY 4 HOURS
Refills: 0 | Status: DISCONTINUED | OUTPATIENT
Start: 2022-01-01 | End: 2022-01-01

## 2022-01-01 RX ORDER — PROPOFOL 10 MG/ML
1000 INJECTION, EMULSION INTRAVENOUS
Qty: 0 | Refills: 0 | DISCHARGE
Start: 2022-01-01

## 2022-01-01 RX ORDER — FENTANYL CITRATE 50 UG/ML
0 INJECTION INTRAVENOUS
Qty: 0 | Refills: 0 | DISCHARGE
Start: 2022-01-01

## 2022-01-01 RX ORDER — MIDAZOLAM HYDROCHLORIDE 1 MG/ML
1 INJECTION, SOLUTION INTRAMUSCULAR; INTRAVENOUS ONCE
Refills: 0 | Status: DISCONTINUED | OUTPATIENT
Start: 2022-01-01 | End: 2022-01-01

## 2022-01-01 RX ORDER — SODIUM CHLORIDE 5 G/100ML
500 INJECTION, SOLUTION INTRAVENOUS
Qty: 0 | Refills: 0 | DISCHARGE
Start: 2022-01-01

## 2022-01-01 RX ORDER — HUMAN INSULIN 100 [IU]/ML
18 INJECTION, SUSPENSION SUBCUTANEOUS AT BEDTIME
Refills: 0 | Status: DISCONTINUED | OUTPATIENT
Start: 2022-01-01 | End: 2022-01-01

## 2022-01-01 RX ORDER — PROPOFOL 10 MG/ML
100 INJECTION, EMULSION INTRAVENOUS ONCE
Refills: 0 | Status: COMPLETED | OUTPATIENT
Start: 2022-01-01 | End: 2022-01-01

## 2022-01-01 RX ORDER — SODIUM CHLORIDE 9 MG/ML
500 INJECTION, SOLUTION INTRAVENOUS ONCE
Refills: 0 | Status: COMPLETED | OUTPATIENT
Start: 2022-01-01 | End: 2022-01-01

## 2022-01-01 RX ORDER — DEXTROSE 50 % IN WATER 50 %
25 SYRINGE (ML) INTRAVENOUS
Qty: 0 | Refills: 0 | DISCHARGE
Start: 2022-01-01

## 2022-01-01 RX ORDER — FENTANYL CITRATE 50 UG/ML
1.5 INJECTION INTRAVENOUS
Qty: 2500 | Refills: 0 | Status: DISCONTINUED | OUTPATIENT
Start: 2022-01-01 | End: 2022-01-01

## 2022-01-01 RX ORDER — POTASSIUM CHLORIDE 20 MEQ
40 PACKET (EA) ORAL
Refills: 0 | Status: COMPLETED | OUTPATIENT
Start: 2022-01-01 | End: 2022-01-01

## 2022-01-01 RX ORDER — OXYCODONE HYDROCHLORIDE 5 MG/1
5 TABLET ORAL EVERY 4 HOURS
Refills: 0 | Status: DISCONTINUED | OUTPATIENT
Start: 2022-01-01 | End: 2022-01-01

## 2022-01-01 RX ORDER — NICARDIPINE HYDROCHLORIDE 30 MG/1
5 CAPSULE, EXTENDED RELEASE ORAL
Qty: 40 | Refills: 0 | Status: DISCONTINUED | OUTPATIENT
Start: 2022-01-01 | End: 2022-01-01

## 2022-01-01 RX ORDER — HUMAN INSULIN 100 [IU]/ML
18 INJECTION, SUSPENSION SUBCUTANEOUS EVERY 6 HOURS
Refills: 0 | Status: DISCONTINUED | OUTPATIENT
Start: 2022-01-01 | End: 2022-01-01

## 2022-01-01 RX ORDER — POTASSIUM CHLORIDE 20 MEQ
40 PACKET (EA) ORAL ONCE
Refills: 0 | Status: DISCONTINUED | OUTPATIENT
Start: 2022-01-01 | End: 2022-01-01

## 2022-01-01 RX ORDER — FENTANYL CITRATE 50 UG/ML
25 INJECTION INTRAVENOUS ONCE
Refills: 0 | Status: DISCONTINUED | OUTPATIENT
Start: 2022-01-01 | End: 2022-01-01

## 2022-01-01 RX ORDER — HUMAN INSULIN 100 [IU]/ML
4 INJECTION, SUSPENSION SUBCUTANEOUS EVERY 6 HOURS
Refills: 0 | Status: DISCONTINUED | OUTPATIENT
Start: 2022-01-01 | End: 2022-01-01

## 2022-01-01 RX ORDER — CEFAZOLIN SODIUM 1 G
VIAL (EA) INJECTION
Refills: 0 | Status: DISCONTINUED | OUTPATIENT
Start: 2022-01-01 | End: 2022-01-01

## 2022-01-01 RX ORDER — ROBINUL 0.2 MG/ML
0.2 INJECTION INTRAMUSCULAR; INTRAVENOUS EVERY 6 HOURS
Refills: 0 | Status: DISCONTINUED | OUTPATIENT
Start: 2022-01-01 | End: 2022-01-01

## 2022-01-01 RX ORDER — GABAPENTIN 400 MG/1
1 CAPSULE ORAL
Qty: 0 | Refills: 0 | DISCHARGE

## 2022-01-01 RX ORDER — BROMOCRIPTINE MESYLATE 5 MG/1
5 CAPSULE ORAL THREE TIMES A DAY
Refills: 0 | Status: DISCONTINUED | OUTPATIENT
Start: 2022-01-01 | End: 2022-01-01

## 2022-01-01 RX ORDER — METOCLOPRAMIDE HCL 10 MG
10 TABLET ORAL ONCE
Refills: 0 | Status: COMPLETED | OUTPATIENT
Start: 2022-01-01 | End: 2022-01-01

## 2022-01-01 RX ORDER — DEXMEDETOMIDINE HYDROCHLORIDE IN 0.9% SODIUM CHLORIDE 4 UG/ML
0.2 INJECTION INTRAVENOUS
Qty: 200 | Refills: 0 | Status: DISCONTINUED | OUTPATIENT
Start: 2022-01-01 | End: 2022-01-01

## 2022-01-01 RX ORDER — LEVETIRACETAM 250 MG/1
5 TABLET, FILM COATED ORAL
Qty: 0 | Refills: 0 | DISCHARGE
Start: 2022-01-01

## 2022-01-01 RX ORDER — AMLODIPINE BESYLATE 2.5 MG/1
5 TABLET ORAL ONCE
Refills: 0 | Status: COMPLETED | OUTPATIENT
Start: 2022-01-01 | End: 2022-01-01

## 2022-01-01 RX ORDER — HUMAN INSULIN 100 [IU]/ML
7 INJECTION, SUSPENSION SUBCUTANEOUS ONCE
Refills: 0 | Status: COMPLETED | OUTPATIENT
Start: 2022-01-01 | End: 2022-01-01

## 2022-01-01 RX ORDER — POTASSIUM CHLORIDE 20 MEQ
20 PACKET (EA) ORAL ONCE
Refills: 0 | Status: COMPLETED | OUTPATIENT
Start: 2022-01-01 | End: 2022-01-01

## 2022-01-01 RX ORDER — PROPOFOL 10 MG/ML
0 INJECTION, EMULSION INTRAVENOUS
Qty: 0 | Refills: 0 | DISCHARGE
Start: 2022-01-01

## 2022-01-01 RX ORDER — FOLIC ACID 0.8 MG
1 TABLET ORAL
Qty: 0 | Refills: 0 | DISCHARGE

## 2022-01-01 RX ORDER — PROPOFOL 10 MG/ML
20 INJECTION, EMULSION INTRAVENOUS
Qty: 1000 | Refills: 0 | Status: DISCONTINUED | OUTPATIENT
Start: 2022-01-01 | End: 2022-01-01

## 2022-01-01 RX ADMIN — AMLODIPINE BESYLATE 10 MILLIGRAM(S): 2.5 TABLET ORAL at 06:00

## 2022-01-01 RX ADMIN — PANTOPRAZOLE SODIUM 40 MILLIGRAM(S): 20 TABLET, DELAYED RELEASE ORAL at 11:20

## 2022-01-01 RX ADMIN — Medication 400 MILLIGRAM(S): at 06:07

## 2022-01-01 RX ADMIN — HUMAN INSULIN 18 UNIT(S): 100 INJECTION, SUSPENSION SUBCUTANEOUS at 17:38

## 2022-01-01 RX ADMIN — CEFTRIAXONE 100 MILLIGRAM(S): 500 INJECTION, POWDER, FOR SOLUTION INTRAMUSCULAR; INTRAVENOUS at 05:41

## 2022-01-01 RX ADMIN — DEXMEDETOMIDINE HYDROCHLORIDE IN 0.9% SODIUM CHLORIDE 2.66 MICROGRAM(S)/KG/HR: 4 INJECTION INTRAVENOUS at 23:00

## 2022-01-01 RX ADMIN — CEFTRIAXONE 100 MILLIGRAM(S): 500 INJECTION, POWDER, FOR SOLUTION INTRAMUSCULAR; INTRAVENOUS at 05:19

## 2022-01-01 RX ADMIN — Medication 100 MILLIGRAM(S): at 21:03

## 2022-01-01 RX ADMIN — HUMAN INSULIN 14 UNIT(S): 100 INJECTION, SUSPENSION SUBCUTANEOUS at 23:23

## 2022-01-01 RX ADMIN — OXYCODONE HYDROCHLORIDE 5 MILLIGRAM(S): 5 TABLET ORAL at 05:05

## 2022-01-01 RX ADMIN — ENOXAPARIN SODIUM 40 MILLIGRAM(S): 100 INJECTION SUBCUTANEOUS at 17:15

## 2022-01-01 RX ADMIN — PANTOPRAZOLE SODIUM 40 MILLIGRAM(S): 20 TABLET, DELAYED RELEASE ORAL at 11:24

## 2022-01-01 RX ADMIN — BROMOCRIPTINE MESYLATE 5 MILLIGRAM(S): 5 CAPSULE ORAL at 21:01

## 2022-01-01 RX ADMIN — AMLODIPINE BESYLATE 5 MILLIGRAM(S): 2.5 TABLET ORAL at 17:32

## 2022-01-01 RX ADMIN — Medication 5 MILLIGRAM(S): at 18:38

## 2022-01-01 RX ADMIN — Medication 975 MILLIGRAM(S): at 08:23

## 2022-01-01 RX ADMIN — Medication 100 MILLIGRAM(S): at 21:35

## 2022-01-01 RX ADMIN — NICARDIPINE HYDROCHLORIDE 25 MG/HR: 30 CAPSULE, EXTENDED RELEASE ORAL at 20:48

## 2022-01-01 RX ADMIN — HUMAN INSULIN 8 UNIT(S): 100 INJECTION, SUSPENSION SUBCUTANEOUS at 17:10

## 2022-01-01 RX ADMIN — FENTANYL CITRATE 25 MICROGRAM(S): 50 INJECTION INTRAVENOUS at 00:00

## 2022-01-01 RX ADMIN — Medication 650 MILLIGRAM(S): at 12:54

## 2022-01-01 RX ADMIN — AMLODIPINE BESYLATE 10 MILLIGRAM(S): 2.5 TABLET ORAL at 05:48

## 2022-01-01 RX ADMIN — Medication 2: at 00:50

## 2022-01-01 RX ADMIN — CHLORHEXIDINE GLUCONATE 15 MILLILITER(S): 213 SOLUTION TOPICAL at 17:47

## 2022-01-01 RX ADMIN — CEFTRIAXONE 100 MILLIGRAM(S): 500 INJECTION, POWDER, FOR SOLUTION INTRAMUSCULAR; INTRAVENOUS at 06:14

## 2022-01-01 RX ADMIN — SODIUM CHLORIDE 30 MILLILITER(S): 5 INJECTION, SOLUTION INTRAVENOUS at 11:00

## 2022-01-01 RX ADMIN — PROPOFOL 100 MILLIGRAM(S): 10 INJECTION, EMULSION INTRAVENOUS at 16:49

## 2022-01-01 RX ADMIN — Medication 2: at 23:08

## 2022-01-01 RX ADMIN — FENTANYL CITRATE 25 MICROGRAM(S): 50 INJECTION INTRAVENOUS at 23:49

## 2022-01-01 RX ADMIN — CHLORHEXIDINE GLUCONATE 15 MILLILITER(S): 213 SOLUTION TOPICAL at 05:18

## 2022-01-01 RX ADMIN — CHLORHEXIDINE GLUCONATE 1 APPLICATION(S): 213 SOLUTION TOPICAL at 06:08

## 2022-01-01 RX ADMIN — Medication 650 MILLIGRAM(S): at 06:00

## 2022-01-01 RX ADMIN — Medication 1000 MILLIGRAM(S): at 22:30

## 2022-01-01 RX ADMIN — FENTANYL CITRATE 25 MICROGRAM(S): 50 INJECTION INTRAVENOUS at 05:15

## 2022-01-01 RX ADMIN — POLYETHYLENE GLYCOL 3350 17 GRAM(S): 17 POWDER, FOR SOLUTION ORAL at 05:18

## 2022-01-01 RX ADMIN — CHLORHEXIDINE GLUCONATE 15 MILLILITER(S): 213 SOLUTION TOPICAL at 17:12

## 2022-01-01 RX ADMIN — ROBINUL 0.2 MILLIGRAM(S): 0.2 INJECTION INTRAMUSCULAR; INTRAVENOUS at 21:53

## 2022-01-01 RX ADMIN — Medication 1: at 06:15

## 2022-01-01 RX ADMIN — Medication 25 MICROGRAM(S): at 05:02

## 2022-01-01 RX ADMIN — Medication 25 MICROGRAM(S): at 05:18

## 2022-01-01 RX ADMIN — Medication 25 GRAM(S): at 12:21

## 2022-01-01 RX ADMIN — Medication 650 MILLIGRAM(S): at 15:20

## 2022-01-01 RX ADMIN — POLYETHYLENE GLYCOL 3350 17 GRAM(S): 17 POWDER, FOR SOLUTION ORAL at 05:38

## 2022-01-01 RX ADMIN — Medication 400 MILLIGRAM(S): at 06:05

## 2022-01-01 RX ADMIN — LEVETIRACETAM 500 MILLIGRAM(S): 250 TABLET, FILM COATED ORAL at 17:18

## 2022-01-01 RX ADMIN — Medication 1000 MILLIGRAM(S): at 21:00

## 2022-01-01 RX ADMIN — BROMOCRIPTINE MESYLATE 5 MILLIGRAM(S): 5 CAPSULE ORAL at 05:18

## 2022-01-01 RX ADMIN — Medication 650 MILLIGRAM(S): at 17:38

## 2022-01-01 RX ADMIN — HUMAN INSULIN 20 UNIT(S): 100 INJECTION, SUSPENSION SUBCUTANEOUS at 00:50

## 2022-01-01 RX ADMIN — PANTOPRAZOLE SODIUM 40 MILLIGRAM(S): 20 TABLET, DELAYED RELEASE ORAL at 11:33

## 2022-01-01 RX ADMIN — HUMAN INSULIN 10 UNIT(S): 100 INJECTION, SUSPENSION SUBCUTANEOUS at 05:07

## 2022-01-01 RX ADMIN — FENTANYL CITRATE 25 MICROGRAM(S): 50 INJECTION INTRAVENOUS at 04:55

## 2022-01-01 RX ADMIN — DEXMEDETOMIDINE HYDROCHLORIDE IN 0.9% SODIUM CHLORIDE 2.66 MICROGRAM(S)/KG/HR: 4 INJECTION INTRAVENOUS at 21:58

## 2022-01-01 RX ADMIN — LEVETIRACETAM 500 MILLIGRAM(S): 250 TABLET, FILM COATED ORAL at 21:06

## 2022-01-01 RX ADMIN — POLYETHYLENE GLYCOL 3350 17 GRAM(S): 17 POWDER, FOR SOLUTION ORAL at 05:08

## 2022-01-01 RX ADMIN — SODIUM CHLORIDE 30 MILLILITER(S): 5 INJECTION, SOLUTION INTRAVENOUS at 21:58

## 2022-01-01 RX ADMIN — HUMAN INSULIN 14 UNIT(S): 100 INJECTION, SUSPENSION SUBCUTANEOUS at 17:52

## 2022-01-01 RX ADMIN — Medication 40 MILLIEQUIVALENT(S): at 05:21

## 2022-01-01 RX ADMIN — Medication 1000 MILLIGRAM(S): at 05:35

## 2022-01-01 RX ADMIN — Medication 650 MILLIGRAM(S): at 15:30

## 2022-01-01 RX ADMIN — SENNA PLUS 10 MILLILITER(S): 8.6 TABLET ORAL at 21:01

## 2022-01-01 RX ADMIN — LOSARTAN POTASSIUM 50 MILLIGRAM(S): 100 TABLET, FILM COATED ORAL at 05:02

## 2022-01-01 RX ADMIN — OXYCODONE HYDROCHLORIDE 5 MILLIGRAM(S): 5 TABLET ORAL at 13:15

## 2022-01-01 RX ADMIN — CEFTRIAXONE 100 MILLIGRAM(S): 500 INJECTION, POWDER, FOR SOLUTION INTRAMUSCULAR; INTRAVENOUS at 05:47

## 2022-01-01 RX ADMIN — LEVETIRACETAM 400 MILLIGRAM(S): 250 TABLET, FILM COATED ORAL at 16:52

## 2022-01-01 RX ADMIN — Medication 40 MILLIEQUIVALENT(S): at 05:20

## 2022-01-01 RX ADMIN — Medication 650 MILLIGRAM(S): at 13:35

## 2022-01-01 RX ADMIN — FENTANYL CITRATE 25 MICROGRAM(S): 50 INJECTION INTRAVENOUS at 16:35

## 2022-01-01 RX ADMIN — OXYCODONE HYDROCHLORIDE 5 MILLIGRAM(S): 5 TABLET ORAL at 00:01

## 2022-01-01 RX ADMIN — CHLORHEXIDINE GLUCONATE 15 MILLILITER(S): 213 SOLUTION TOPICAL at 17:16

## 2022-01-01 RX ADMIN — FENTANYL CITRATE 2.66 MICROGRAM(S)/KG/HR: 50 INJECTION INTRAVENOUS at 20:42

## 2022-01-01 RX ADMIN — Medication 1: at 05:09

## 2022-01-01 RX ADMIN — Medication 650 MILLIGRAM(S): at 16:20

## 2022-01-01 RX ADMIN — FENTANYL CITRATE 12.5 MICROGRAM(S): 50 INJECTION INTRAVENOUS at 03:45

## 2022-01-01 RX ADMIN — SENNA PLUS 10 MILLILITER(S): 8.6 TABLET ORAL at 22:06

## 2022-01-01 RX ADMIN — HYDROMORPHONE HYDROCHLORIDE 2 MILLIGRAM(S): 2 INJECTION INTRAMUSCULAR; INTRAVENOUS; SUBCUTANEOUS at 22:00

## 2022-01-01 RX ADMIN — CHLORHEXIDINE GLUCONATE 15 MILLILITER(S): 213 SOLUTION TOPICAL at 05:38

## 2022-01-01 RX ADMIN — Medication 650 MILLIGRAM(S): at 10:14

## 2022-01-01 RX ADMIN — Medication 25 MICROGRAM(S): at 05:00

## 2022-01-01 RX ADMIN — Medication 1000 MILLIGRAM(S): at 22:00

## 2022-01-01 RX ADMIN — FENTANYL CITRATE 25 MICROGRAM(S): 50 INJECTION INTRAVENOUS at 00:30

## 2022-01-01 RX ADMIN — POLYETHYLENE GLYCOL 3350 17 GRAM(S): 17 POWDER, FOR SOLUTION ORAL at 05:17

## 2022-01-01 RX ADMIN — Medication 400 MILLIGRAM(S): at 20:39

## 2022-01-01 RX ADMIN — CHLORHEXIDINE GLUCONATE 1 APPLICATION(S): 213 SOLUTION TOPICAL at 21:36

## 2022-01-01 RX ADMIN — Medication 650 MILLIGRAM(S): at 05:18

## 2022-01-01 RX ADMIN — ENOXAPARIN SODIUM 40 MILLIGRAM(S): 100 INJECTION SUBCUTANEOUS at 17:10

## 2022-01-01 RX ADMIN — BROMOCRIPTINE MESYLATE 5 MILLIGRAM(S): 5 CAPSULE ORAL at 05:09

## 2022-01-01 RX ADMIN — HUMAN INSULIN 20 UNIT(S): 100 INJECTION, SUSPENSION SUBCUTANEOUS at 05:43

## 2022-01-01 RX ADMIN — SODIUM CHLORIDE 1000 MILLILITER(S): 9 INJECTION INTRAMUSCULAR; INTRAVENOUS; SUBCUTANEOUS at 18:50

## 2022-01-01 RX ADMIN — CHLORHEXIDINE GLUCONATE 1 APPLICATION(S): 213 SOLUTION TOPICAL at 21:06

## 2022-01-01 RX ADMIN — SODIUM CHLORIDE 1000 MILLILITER(S): 9 INJECTION INTRAMUSCULAR; INTRAVENOUS; SUBCUTANEOUS at 10:20

## 2022-01-01 RX ADMIN — SENNA PLUS 10 MILLILITER(S): 8.6 TABLET ORAL at 21:02

## 2022-01-01 RX ADMIN — Medication 2: at 05:41

## 2022-01-01 RX ADMIN — Medication 1: at 00:10

## 2022-01-01 RX ADMIN — FENTANYL CITRATE 12.5 MICROGRAM(S): 50 INJECTION INTRAVENOUS at 04:12

## 2022-01-01 RX ADMIN — FENTANYL CITRATE 12.5 MICROGRAM(S): 50 INJECTION INTRAVENOUS at 05:35

## 2022-01-01 RX ADMIN — PANTOPRAZOLE SODIUM 40 MILLIGRAM(S): 20 TABLET, DELAYED RELEASE ORAL at 12:27

## 2022-01-01 RX ADMIN — AMLODIPINE BESYLATE 10 MILLIGRAM(S): 2.5 TABLET ORAL at 05:18

## 2022-01-01 RX ADMIN — CHLORHEXIDINE GLUCONATE 1 APPLICATION(S): 213 SOLUTION TOPICAL at 21:57

## 2022-01-01 RX ADMIN — Medication 1: at 12:25

## 2022-01-01 RX ADMIN — MORPHINE SULFATE 2 MILLIGRAM(S): 50 CAPSULE, EXTENDED RELEASE ORAL at 04:18

## 2022-01-01 RX ADMIN — Medication 12.5 MICROGRAM(S): at 21:57

## 2022-01-01 RX ADMIN — SENNA PLUS 10 MILLILITER(S): 8.6 TABLET ORAL at 22:32

## 2022-01-01 RX ADMIN — PANTOPRAZOLE SODIUM 40 MILLIGRAM(S): 20 TABLET, DELAYED RELEASE ORAL at 11:19

## 2022-01-01 RX ADMIN — Medication 40 MILLIEQUIVALENT(S): at 23:52

## 2022-01-01 RX ADMIN — HUMAN INSULIN 8 UNIT(S): 100 INJECTION, SUSPENSION SUBCUTANEOUS at 23:03

## 2022-01-01 RX ADMIN — OXYCODONE HYDROCHLORIDE 5 MILLIGRAM(S): 5 TABLET ORAL at 12:45

## 2022-01-01 RX ADMIN — Medication 2: at 11:49

## 2022-01-01 RX ADMIN — LOSARTAN POTASSIUM 100 MILLIGRAM(S): 100 TABLET, FILM COATED ORAL at 05:48

## 2022-01-01 RX ADMIN — SENNA PLUS 10 MILLILITER(S): 8.6 TABLET ORAL at 21:17

## 2022-01-01 RX ADMIN — POLYETHYLENE GLYCOL 3350 17 GRAM(S): 17 POWDER, FOR SOLUTION ORAL at 05:04

## 2022-01-01 RX ADMIN — POLYETHYLENE GLYCOL 3350 17 GRAM(S): 17 POWDER, FOR SOLUTION ORAL at 17:54

## 2022-01-01 RX ADMIN — FENTANYL CITRATE 50 MICROGRAM(S): 50 INJECTION INTRAVENOUS at 01:08

## 2022-01-01 RX ADMIN — Medication 100 MILLIGRAM(S): at 16:49

## 2022-01-01 RX ADMIN — CHLORHEXIDINE GLUCONATE 15 MILLILITER(S): 213 SOLUTION TOPICAL at 05:07

## 2022-01-01 RX ADMIN — CHLORHEXIDINE GLUCONATE 15 MILLILITER(S): 213 SOLUTION TOPICAL at 17:39

## 2022-01-01 RX ADMIN — BROMOCRIPTINE MESYLATE 5 MILLIGRAM(S): 5 CAPSULE ORAL at 13:13

## 2022-01-01 RX ADMIN — ENOXAPARIN SODIUM 40 MILLIGRAM(S): 100 INJECTION SUBCUTANEOUS at 17:47

## 2022-01-01 RX ADMIN — FENTANYL CITRATE 25 MICROGRAM(S): 50 INJECTION INTRAVENOUS at 03:56

## 2022-01-01 RX ADMIN — CHLORHEXIDINE GLUCONATE 1 APPLICATION(S): 213 SOLUTION TOPICAL at 21:03

## 2022-01-01 RX ADMIN — FENTANYL CITRATE 25 MICROGRAM(S): 50 INJECTION INTRAVENOUS at 19:03

## 2022-01-01 RX ADMIN — FENTANYL CITRATE 25 MICROGRAM(S): 50 INJECTION INTRAVENOUS at 00:19

## 2022-01-01 RX ADMIN — Medication 1: at 18:55

## 2022-01-01 RX ADMIN — HUMAN INSULIN 14 UNIT(S): 100 INJECTION, SUSPENSION SUBCUTANEOUS at 17:22

## 2022-01-01 RX ADMIN — Medication 2: at 11:19

## 2022-01-01 RX ADMIN — Medication 100 MILLIGRAM(S): at 21:29

## 2022-01-01 RX ADMIN — FENTANYL CITRATE 50 MICROGRAM(S): 50 INJECTION INTRAVENOUS at 20:15

## 2022-01-01 RX ADMIN — ENOXAPARIN SODIUM 40 MILLIGRAM(S): 100 INJECTION SUBCUTANEOUS at 17:16

## 2022-01-01 RX ADMIN — Medication 4: at 11:40

## 2022-01-01 RX ADMIN — Medication 25 MICROGRAM(S): at 05:04

## 2022-01-01 RX ADMIN — SENNA PLUS 10 MILLILITER(S): 8.6 TABLET ORAL at 21:08

## 2022-01-01 RX ADMIN — Medication 10 MILLIGRAM(S): at 07:43

## 2022-01-01 RX ADMIN — PANTOPRAZOLE SODIUM 40 MILLIGRAM(S): 20 TABLET, DELAYED RELEASE ORAL at 11:40

## 2022-01-01 RX ADMIN — Medication 1000 MILLIGRAM(S): at 07:00

## 2022-01-01 RX ADMIN — FENTANYL CITRATE 50 MICROGRAM(S): 50 INJECTION INTRAVENOUS at 16:49

## 2022-01-01 RX ADMIN — HUMAN INSULIN 20 UNIT(S): 100 INJECTION, SUSPENSION SUBCUTANEOUS at 11:33

## 2022-01-01 RX ADMIN — POLYETHYLENE GLYCOL 3350 17 GRAM(S): 17 POWDER, FOR SOLUTION ORAL at 17:18

## 2022-01-01 RX ADMIN — Medication 2: at 11:34

## 2022-01-01 RX ADMIN — BROMOCRIPTINE MESYLATE 5 MILLIGRAM(S): 5 CAPSULE ORAL at 13:09

## 2022-01-01 RX ADMIN — Medication 1000 MILLIGRAM(S): at 06:40

## 2022-01-01 RX ADMIN — Medication 4: at 05:24

## 2022-01-01 RX ADMIN — Medication 100 MILLIGRAM(S): at 05:18

## 2022-01-01 RX ADMIN — Medication 5 MILLIGRAM(S): at 10:13

## 2022-01-01 RX ADMIN — SODIUM CHLORIDE 30 MILLILITER(S): 5 INJECTION, SOLUTION INTRAVENOUS at 17:18

## 2022-01-01 RX ADMIN — POLYETHYLENE GLYCOL 3350 17 GRAM(S): 17 POWDER, FOR SOLUTION ORAL at 17:13

## 2022-01-01 RX ADMIN — PROPOFOL 7.2 MICROGRAM(S)/KG/MIN: 10 INJECTION, EMULSION INTRAVENOUS at 20:42

## 2022-01-01 RX ADMIN — LOSARTAN POTASSIUM 100 MILLIGRAM(S): 100 TABLET, FILM COATED ORAL at 05:18

## 2022-01-01 RX ADMIN — CHLORHEXIDINE GLUCONATE 1 APPLICATION(S): 213 SOLUTION TOPICAL at 21:09

## 2022-01-01 RX ADMIN — Medication 4: at 17:44

## 2022-01-01 RX ADMIN — POLYETHYLENE GLYCOL 3350 17 GRAM(S): 17 POWDER, FOR SOLUTION ORAL at 17:16

## 2022-01-01 RX ADMIN — AMLODIPINE BESYLATE 10 MILLIGRAM(S): 2.5 TABLET ORAL at 05:38

## 2022-01-01 RX ADMIN — Medication 100 MILLIGRAM(S): at 00:48

## 2022-01-01 RX ADMIN — AMLODIPINE BESYLATE 10 MILLIGRAM(S): 2.5 TABLET ORAL at 05:00

## 2022-01-01 RX ADMIN — POTASSIUM PHOSPHATE, MONOBASIC POTASSIUM PHOSPHATE, DIBASIC 83.33 MILLIMOLE(S): 236; 224 INJECTION, SOLUTION INTRAVENOUS at 20:15

## 2022-01-01 RX ADMIN — Medication 10 MILLIGRAM(S): at 05:07

## 2022-01-01 RX ADMIN — CHLORHEXIDINE GLUCONATE 1 APPLICATION(S): 213 SOLUTION TOPICAL at 21:07

## 2022-01-01 RX ADMIN — Medication 650 MILLIGRAM(S): at 22:00

## 2022-01-01 RX ADMIN — Medication 40 MILLIEQUIVALENT(S): at 12:21

## 2022-01-01 RX ADMIN — SODIUM CHLORIDE 2000 MILLILITER(S): 9 INJECTION, SOLUTION INTRAVENOUS at 07:18

## 2022-01-01 RX ADMIN — HUMAN INSULIN 18 UNIT(S): 100 INJECTION, SUSPENSION SUBCUTANEOUS at 11:17

## 2022-01-01 RX ADMIN — BROMOCRIPTINE MESYLATE 5 MILLIGRAM(S): 5 CAPSULE ORAL at 14:11

## 2022-01-01 RX ADMIN — HUMAN INSULIN 14 UNIT(S): 100 INJECTION, SUSPENSION SUBCUTANEOUS at 05:17

## 2022-01-01 RX ADMIN — Medication 650 MILLIGRAM(S): at 10:37

## 2022-01-01 RX ADMIN — OXYCODONE HYDROCHLORIDE 5 MILLIGRAM(S): 5 TABLET ORAL at 14:30

## 2022-01-01 RX ADMIN — Medication 650 MILLIGRAM(S): at 11:20

## 2022-01-01 RX ADMIN — CHLORHEXIDINE GLUCONATE 15 MILLILITER(S): 213 SOLUTION TOPICAL at 05:04

## 2022-01-01 RX ADMIN — FENTANYL CITRATE 50 MICROGRAM(S): 50 INJECTION INTRAVENOUS at 20:00

## 2022-01-01 RX ADMIN — LOSARTAN POTASSIUM 100 MILLIGRAM(S): 100 TABLET, FILM COATED ORAL at 05:38

## 2022-01-01 RX ADMIN — CHLORHEXIDINE GLUCONATE 15 MILLILITER(S): 213 SOLUTION TOPICAL at 05:17

## 2022-01-01 RX ADMIN — HUMAN INSULIN 9 UNIT(S): 100 INJECTION, SUSPENSION SUBCUTANEOUS at 05:50

## 2022-01-01 RX ADMIN — CHLORHEXIDINE GLUCONATE 1 APPLICATION(S): 213 SOLUTION TOPICAL at 22:07

## 2022-01-01 RX ADMIN — HUMAN INSULIN 14 UNIT(S): 100 INJECTION, SUSPENSION SUBCUTANEOUS at 05:42

## 2022-01-01 RX ADMIN — OXYCODONE HYDROCHLORIDE 5 MILLIGRAM(S): 5 TABLET ORAL at 14:15

## 2022-01-01 RX ADMIN — BROMOCRIPTINE MESYLATE 5 MILLIGRAM(S): 5 CAPSULE ORAL at 21:02

## 2022-01-01 RX ADMIN — HYDROMORPHONE HYDROCHLORIDE 2 MILLIGRAM(S): 2 INJECTION INTRAMUSCULAR; INTRAVENOUS; SUBCUTANEOUS at 21:53

## 2022-01-01 RX ADMIN — CHLORHEXIDINE GLUCONATE 15 MILLILITER(S): 213 SOLUTION TOPICAL at 05:14

## 2022-01-01 RX ADMIN — Medication 100 MILLIGRAM(S): at 06:00

## 2022-01-01 RX ADMIN — Medication 6: at 17:11

## 2022-01-01 RX ADMIN — PANTOPRAZOLE SODIUM 40 MILLIGRAM(S): 20 TABLET, DELAYED RELEASE ORAL at 11:47

## 2022-01-01 RX ADMIN — Medication 1: at 23:55

## 2022-01-01 RX ADMIN — CHLORHEXIDINE GLUCONATE 15 MILLILITER(S): 213 SOLUTION TOPICAL at 05:02

## 2022-01-01 RX ADMIN — Medication 30 MILLIEQUIVALENT(S): at 02:31

## 2022-01-01 RX ADMIN — CHLORHEXIDINE GLUCONATE 15 MILLILITER(S): 213 SOLUTION TOPICAL at 17:15

## 2022-01-01 RX ADMIN — Medication 100 MILLIGRAM(S): at 16:35

## 2022-01-01 RX ADMIN — LEVETIRACETAM 500 MILLIGRAM(S): 250 TABLET, FILM COATED ORAL at 06:08

## 2022-01-01 RX ADMIN — NICARDIPINE HYDROCHLORIDE 25 MG/HR: 30 CAPSULE, EXTENDED RELEASE ORAL at 10:02

## 2022-01-01 RX ADMIN — LOSARTAN POTASSIUM 100 MILLIGRAM(S): 100 TABLET, FILM COATED ORAL at 05:00

## 2022-01-01 RX ADMIN — Medication 2: at 17:23

## 2022-01-01 RX ADMIN — Medication 6: at 11:15

## 2022-01-01 RX ADMIN — HUMAN INSULIN 7 UNIT(S): 100 INJECTION, SUSPENSION SUBCUTANEOUS at 05:59

## 2022-01-01 RX ADMIN — HUMAN INSULIN 14 UNIT(S): 100 INJECTION, SUSPENSION SUBCUTANEOUS at 00:49

## 2022-01-01 RX ADMIN — HUMAN INSULIN 4 UNIT(S): 100 INJECTION, SUSPENSION SUBCUTANEOUS at 05:23

## 2022-01-01 RX ADMIN — POTASSIUM PHOSPHATE, MONOBASIC POTASSIUM PHOSPHATE, DIBASIC 83.33 MILLIMOLE(S): 236; 224 INJECTION, SOLUTION INTRAVENOUS at 06:17

## 2022-01-01 RX ADMIN — CHLORHEXIDINE GLUCONATE 1 APPLICATION(S): 213 SOLUTION TOPICAL at 21:17

## 2022-01-01 RX ADMIN — CHLORHEXIDINE GLUCONATE 15 MILLILITER(S): 213 SOLUTION TOPICAL at 18:50

## 2022-01-01 RX ADMIN — Medication 400 MILLIGRAM(S): at 20:15

## 2022-01-01 RX ADMIN — POLYETHYLENE GLYCOL 3350 17 GRAM(S): 17 POWDER, FOR SOLUTION ORAL at 17:11

## 2022-01-01 RX ADMIN — LOSARTAN POTASSIUM 100 MILLIGRAM(S): 100 TABLET, FILM COATED ORAL at 06:00

## 2022-01-01 RX ADMIN — CHLORHEXIDINE GLUCONATE 15 MILLILITER(S): 213 SOLUTION TOPICAL at 17:10

## 2022-01-01 RX ADMIN — PROPOFOL 11.2 MICROGRAM(S)/KG/MIN: 10 INJECTION, EMULSION INTRAVENOUS at 21:57

## 2022-01-01 RX ADMIN — Medication 400 MILLIGRAM(S): at 05:05

## 2022-01-01 RX ADMIN — Medication 10 MILLIGRAM(S): at 05:25

## 2022-01-01 RX ADMIN — Medication 1000 MILLIGRAM(S): at 03:30

## 2022-01-01 RX ADMIN — Medication 100 MILLIGRAM(S): at 05:39

## 2022-01-01 RX ADMIN — BROMOCRIPTINE MESYLATE 5 MILLIGRAM(S): 5 CAPSULE ORAL at 21:06

## 2022-01-01 RX ADMIN — SENNA PLUS 10 MILLILITER(S): 8.6 TABLET ORAL at 21:29

## 2022-01-01 RX ADMIN — FENTANYL CITRATE 25 MICROGRAM(S): 50 INJECTION INTRAVENOUS at 09:12

## 2022-01-01 RX ADMIN — BROMOCRIPTINE MESYLATE 5 MILLIGRAM(S): 5 CAPSULE ORAL at 21:17

## 2022-01-01 RX ADMIN — Medication 1: at 05:07

## 2022-01-01 RX ADMIN — SODIUM CHLORIDE 500 MILLILITER(S): 9 INJECTION, SOLUTION INTRAVENOUS at 13:51

## 2022-01-01 RX ADMIN — FENTANYL CITRATE 25 MICROGRAM(S): 50 INJECTION INTRAVENOUS at 16:30

## 2022-01-01 RX ADMIN — PANTOPRAZOLE SODIUM 40 MILLIGRAM(S): 20 TABLET, DELAYED RELEASE ORAL at 12:25

## 2022-01-01 RX ADMIN — LOSARTAN POTASSIUM 50 MILLIGRAM(S): 100 TABLET, FILM COATED ORAL at 12:24

## 2022-01-01 RX ADMIN — Medication 2: at 05:08

## 2022-01-01 RX ADMIN — OXYCODONE HYDROCHLORIDE 5 MILLIGRAM(S): 5 TABLET ORAL at 05:35

## 2022-01-01 RX ADMIN — CHLORHEXIDINE GLUCONATE 15 MILLILITER(S): 213 SOLUTION TOPICAL at 05:09

## 2022-01-01 RX ADMIN — Medication 100 MILLIGRAM(S): at 00:36

## 2022-01-01 RX ADMIN — Medication 40 MILLIEQUIVALENT(S): at 13:52

## 2022-01-01 RX ADMIN — Medication 650 MILLIGRAM(S): at 12:24

## 2022-01-01 RX ADMIN — Medication 25 MICROGRAM(S): at 06:00

## 2022-01-01 RX ADMIN — Medication 650 MILLIGRAM(S): at 21:16

## 2022-01-01 RX ADMIN — CHLORHEXIDINE GLUCONATE 15 MILLILITER(S): 213 SOLUTION TOPICAL at 20:47

## 2022-01-01 RX ADMIN — CHLORHEXIDINE GLUCONATE 15 MILLILITER(S): 213 SOLUTION TOPICAL at 06:07

## 2022-01-01 RX ADMIN — HUMAN INSULIN 14 UNIT(S): 100 INJECTION, SUSPENSION SUBCUTANEOUS at 11:41

## 2022-01-01 RX ADMIN — Medication 400 MILLIGRAM(S): at 05:19

## 2022-01-01 RX ADMIN — FENTANYL CITRATE 25 MICROGRAM(S): 50 INJECTION INTRAVENOUS at 03:20

## 2022-01-01 RX ADMIN — LEVETIRACETAM 400 MILLIGRAM(S): 250 TABLET, FILM COATED ORAL at 05:09

## 2022-01-01 RX ADMIN — FENTANYL CITRATE 50 MICROGRAM(S): 50 INJECTION INTRAVENOUS at 00:38

## 2022-01-01 RX ADMIN — Medication 20 MILLIEQUIVALENT(S): at 05:18

## 2022-01-01 RX ADMIN — FENTANYL CITRATE 25 MICROGRAM(S): 50 INJECTION INTRAVENOUS at 18:33

## 2022-01-01 RX ADMIN — MIDAZOLAM HYDROCHLORIDE 1 MILLIGRAM(S): 1 INJECTION, SOLUTION INTRAMUSCULAR; INTRAVENOUS at 00:37

## 2022-01-01 RX ADMIN — FENTANYL CITRATE 25 MICROGRAM(S): 50 INJECTION INTRAVENOUS at 11:32

## 2022-01-01 RX ADMIN — Medication 4: at 05:19

## 2022-01-01 RX ADMIN — Medication 400 MILLIGRAM(S): at 22:00

## 2022-01-01 RX ADMIN — Medication 2: at 12:26

## 2022-01-01 RX ADMIN — HUMAN INSULIN 14 UNIT(S): 100 INJECTION, SUSPENSION SUBCUTANEOUS at 12:26

## 2022-01-01 RX ADMIN — PHENYLEPHRINE HYDROCHLORIDE 5.99 MICROGRAM(S)/KG/MIN: 10 INJECTION INTRAVENOUS at 21:58

## 2022-01-01 RX ADMIN — Medication 10 MILLIGRAM(S): at 17:00

## 2022-01-01 RX ADMIN — Medication 3: at 23:49

## 2022-01-01 RX ADMIN — Medication 6: at 11:40

## 2022-01-01 RX ADMIN — Medication 650 MILLIGRAM(S): at 05:47

## 2022-01-01 RX ADMIN — Medication 400 MILLIGRAM(S): at 21:58

## 2022-01-01 RX ADMIN — Medication 1000 MILLIGRAM(S): at 05:50

## 2022-01-01 RX ADMIN — SODIUM CHLORIDE 500 MILLILITER(S): 9 INJECTION INTRAMUSCULAR; INTRAVENOUS; SUBCUTANEOUS at 12:01

## 2022-01-01 RX ADMIN — HUMAN INSULIN 14 UNIT(S): 100 INJECTION, SUSPENSION SUBCUTANEOUS at 11:25

## 2022-01-01 RX ADMIN — ENOXAPARIN SODIUM 40 MILLIGRAM(S): 100 INJECTION SUBCUTANEOUS at 17:17

## 2022-01-01 RX ADMIN — SENNA PLUS 10 MILLILITER(S): 8.6 TABLET ORAL at 21:16

## 2022-01-01 RX ADMIN — FENTANYL CITRATE 25 MICROGRAM(S): 50 INJECTION INTRAVENOUS at 04:25

## 2022-01-01 RX ADMIN — HUMAN INSULIN 20 UNIT(S): 100 INJECTION, SUSPENSION SUBCUTANEOUS at 17:48

## 2022-01-01 RX ADMIN — LEVETIRACETAM 400 MILLIGRAM(S): 250 TABLET, FILM COATED ORAL at 18:50

## 2022-01-01 RX ADMIN — Medication 2: at 17:11

## 2022-01-01 RX ADMIN — Medication 25 MICROGRAM(S): at 12:29

## 2022-01-01 RX ADMIN — CHLORHEXIDINE GLUCONATE 1 APPLICATION(S): 213 SOLUTION TOPICAL at 22:32

## 2022-01-01 RX ADMIN — ENOXAPARIN SODIUM 40 MILLIGRAM(S): 100 INJECTION SUBCUTANEOUS at 17:39

## 2022-01-01 RX ADMIN — PANTOPRAZOLE SODIUM 40 MILLIGRAM(S): 20 TABLET, DELAYED RELEASE ORAL at 11:14

## 2022-01-01 RX ADMIN — FENTANYL CITRATE 25 MICROGRAM(S): 50 INJECTION INTRAVENOUS at 04:45

## 2022-01-01 RX ADMIN — HUMAN INSULIN 14 UNIT(S): 100 INJECTION, SUSPENSION SUBCUTANEOUS at 00:48

## 2022-01-01 RX ADMIN — Medication 650 MILLIGRAM(S): at 13:03

## 2022-01-01 RX ADMIN — FENTANYL CITRATE 25 MICROGRAM(S): 50 INJECTION INTRAVENOUS at 19:35

## 2022-01-01 RX ADMIN — Medication 2: at 23:03

## 2022-01-01 RX ADMIN — FENTANYL CITRATE 25 MICROGRAM(S): 50 INJECTION INTRAVENOUS at 22:05

## 2022-01-01 RX ADMIN — Medication 100 MILLIGRAM(S): at 22:06

## 2022-01-01 RX ADMIN — FENTANYL CITRATE 25 MICROGRAM(S): 50 INJECTION INTRAVENOUS at 16:15

## 2022-01-01 RX ADMIN — Medication 100 MILLIGRAM(S): at 16:15

## 2022-01-01 RX ADMIN — CHLORHEXIDINE GLUCONATE 15 MILLILITER(S): 213 SOLUTION TOPICAL at 17:18

## 2022-01-01 RX ADMIN — HUMAN INSULIN 14 UNIT(S): 100 INJECTION, SUSPENSION SUBCUTANEOUS at 17:16

## 2022-01-01 RX ADMIN — CHLORHEXIDINE GLUCONATE 15 MILLILITER(S): 213 SOLUTION TOPICAL at 17:54

## 2022-01-01 RX ADMIN — FENTANYL CITRATE 25 MICROGRAM(S): 50 INJECTION INTRAVENOUS at 16:50

## 2022-01-01 RX ADMIN — POLYETHYLENE GLYCOL 3350 17 GRAM(S): 17 POWDER, FOR SOLUTION ORAL at 17:15

## 2022-01-01 RX ADMIN — HUMAN INSULIN 8 UNIT(S): 100 INJECTION, SUSPENSION SUBCUTANEOUS at 11:38

## 2022-01-01 RX ADMIN — Medication 2: at 17:53

## 2022-01-01 RX ADMIN — Medication 2: at 00:48

## 2022-01-01 RX ADMIN — OXYCODONE HYDROCHLORIDE 5 MILLIGRAM(S): 5 TABLET ORAL at 23:31

## 2022-01-01 RX ADMIN — Medication 25 MICROGRAM(S): at 05:48

## 2022-01-01 RX ADMIN — FENTANYL CITRATE 25 MICROGRAM(S): 50 INJECTION INTRAVENOUS at 03:50

## 2022-01-01 RX ADMIN — Medication 4: at 17:38

## 2022-01-01 RX ADMIN — POLYETHYLENE GLYCOL 3350 17 GRAM(S): 17 POWDER, FOR SOLUTION ORAL at 17:10

## 2022-01-01 RX ADMIN — HUMAN INSULIN 20 UNIT(S): 100 INJECTION, SUSPENSION SUBCUTANEOUS at 17:45

## 2022-01-01 RX ADMIN — Medication 100 MILLIGRAM(S): at 15:27

## 2022-01-01 RX ADMIN — Medication 2: at 05:50

## 2022-01-01 RX ADMIN — Medication 2: at 17:10

## 2022-01-01 RX ADMIN — Medication 1: at 17:19

## 2022-01-01 RX ADMIN — Medication 2: at 17:15

## 2022-01-01 RX ADMIN — Medication 400 MILLIGRAM(S): at 03:00

## 2022-01-01 RX ADMIN — Medication 100 MILLIGRAM(S): at 21:17

## 2022-01-01 RX ADMIN — BROMOCRIPTINE MESYLATE 5 MILLIGRAM(S): 5 CAPSULE ORAL at 13:03

## 2022-01-01 RX ADMIN — ENOXAPARIN SODIUM 40 MILLIGRAM(S): 100 INJECTION SUBCUTANEOUS at 17:54

## 2022-01-01 RX ADMIN — PROPOFOL 7.2 MICROGRAM(S)/KG/MIN: 10 INJECTION, EMULSION INTRAVENOUS at 16:38

## 2022-01-01 RX ADMIN — Medication 100 MILLIGRAM(S): at 23:50

## 2022-01-01 RX ADMIN — FENTANYL CITRATE 25 MICROGRAM(S): 50 INJECTION INTRAVENOUS at 03:26

## 2022-01-01 RX ADMIN — CHLORHEXIDINE GLUCONATE 1 APPLICATION(S): 213 SOLUTION TOPICAL at 23:55

## 2022-01-01 RX ADMIN — HUMAN INSULIN 10 UNIT(S): 100 INJECTION, SUSPENSION SUBCUTANEOUS at 17:11

## 2022-01-01 RX ADMIN — LEVETIRACETAM 500 MILLIGRAM(S): 250 TABLET, FILM COATED ORAL at 05:02

## 2022-01-01 RX ADMIN — HUMAN INSULIN 14 UNIT(S): 100 INJECTION, SUSPENSION SUBCUTANEOUS at 05:19

## 2022-01-01 RX ADMIN — HUMAN INSULIN 10 UNIT(S): 100 INJECTION, SUSPENSION SUBCUTANEOUS at 11:49

## 2022-01-01 RX ADMIN — Medication 30 MILLIEQUIVALENT(S): at 21:06

## 2022-01-01 RX ADMIN — Medication 4: at 23:23

## 2022-01-01 RX ADMIN — Medication 650 MILLIGRAM(S): at 09:30

## 2022-01-01 RX ADMIN — Medication 25 MICROGRAM(S): at 05:38

## 2022-01-01 RX ADMIN — CHLORHEXIDINE GLUCONATE 1 APPLICATION(S): 213 SOLUTION TOPICAL at 21:01

## 2022-01-01 RX ADMIN — SODIUM CHLORIDE 1000 MILLILITER(S): 9 INJECTION INTRAMUSCULAR; INTRAVENOUS; SUBCUTANEOUS at 22:15

## 2022-01-01 RX ADMIN — Medication 100 MILLIGRAM(S): at 21:01

## 2022-01-01 RX ADMIN — CHLORHEXIDINE GLUCONATE 15 MILLILITER(S): 213 SOLUTION TOPICAL at 05:47

## 2022-01-01 RX ADMIN — Medication 975 MILLIGRAM(S): at 08:53

## 2022-01-01 RX ADMIN — Medication 100 MILLIGRAM(S): at 05:47

## 2022-01-01 RX ADMIN — Medication 100 MILLIGRAM(S): at 13:13

## 2022-01-01 RX ADMIN — Medication 10 MILLIGRAM(S): at 20:01

## 2022-01-01 RX ADMIN — HUMAN INSULIN 18 UNIT(S): 100 INJECTION, SUSPENSION SUBCUTANEOUS at 11:19

## 2022-01-01 RX ADMIN — Medication 1000 MILLIGRAM(S): at 21:09

## 2022-12-18 NOTE — H&P ADULT - NSHPLABSRESULTS_GEN_ALL_CORE
LABS: When present labs, imaging, and ECG were personally reviewed                          11.1   11.18 )-----------( 395      ( 18 Dec 2022 15:44 )             36.3       12-18    138  |  97<L>  |  10  ----------------------------<  151<H>  3.9   |  24  |  0.54    Ca    10.5      18 Dec 2022 15:44    TPro  7.9  /  Alb  4.9  /  TBili  0.8  /  DBili  x   /  AST  22  /  ALT  15  /  AlkPhos  82  12-18       LIVER FUNCTIONS - ( 18 Dec 2022 15:44 )  Alb: 4.9 g/dL / Pro: 7.9 g/dL / ALK PHOS: 82 U/L / ALT: 15 U/L / AST: 22 U/L / GGT: x                        PT/INR - ( 18 Dec 2022 15:44 )   PT: 11.2 sec;   INR: 0.97 ratio         PTT - ( 18 Dec 2022 15:44 )  PTT:26.2 sec    Lactate Trend            CAPILLARY BLOOD GLUCOSE  155 (18 Dec 2022 15:45)      POCT Blood Glucose.: 155 mg/dL (18 Dec 2022 15:38)            RADIOLOGY & ADDITIONAL TESTS:  < from: CT Brain Stroke Protocol (12.18.22 @ 16:06) >    IMPRESSION:    Acute intraparenchymal hemorrhage within the left frontoparietal lobe and   thalamus. There is mass effect on the left lateral ventricle and left   ambient cistern.    < end of copied text >

## 2022-12-18 NOTE — ED PROVIDER NOTE - CLINICAL SUMMARY MEDICAL DECISION MAKING FREE TEXT BOX
pt with htn and dm presents unresponsive, c/f ICH vs tox, metabolic. code stroke, low threshold to intubate, will need admission. BG wnl, no trauma reported.

## 2022-12-18 NOTE — ED ADULT NURSE NOTE - OBJECTIVE STATEMENT
Jw RN: Pt received to Tr A from CT scan for AMS and body numbness brought in by EMS. Pt noted to have a hemorrhage on CT scan as per MD and brought to Tr A for intubation. Pt intubated 7.5 tub with 22 at the lip. Pt medicated as ordered. neuro surg at the bedside. report given to primary RN and jw RN. will continue to monitor. Jw RN: Pt received to Tr A from CT scan for AMS and body numbness brought in by EMS. Pt noted to have a hemorrhage on CT scan as per MD and brought to Tr A for intubation. Pt intubated 7.5 tub with 21 at the lip. Pt medicated as ordered. neuro surg at the bedside. report given to primary RN and jw RN. will continue to monitor.

## 2022-12-18 NOTE — ED PROVIDER NOTE - OBJECTIVE STATEMENT
72 F hx htn and dm presents for AMS, unresponsive. Hx from ems and daughter. Was in [facility] until 1 hour PTA. Then became unresponsive, unclear circumstances, no trauma reported, not on AC/AP. BP in 190-200 range with EMS. saturating well.

## 2022-12-18 NOTE — ED PROVIDER NOTE - PHYSICAL EXAMINATION
Vitals: I have reviewed the patients vital signs  General: ill appearing  HEENT: Atraumatic, normocephalic, airway patent  Eyes: roving, 2-3mm reactive  Neck: no tracheal deviation, no JVD  Chest/Lungs: no trauma, symmetric chest rise, speaking in complete sentences, no WOB  Heart: skin and extremities well perfused, regular rate and rhythm  Neuro: unresponsive, no pain response R side, minimal left, +gag, not opening eyes spontaneously, GCS 1,1,4  MSK: flaccid, no posturing  Skin: no cyanosis, no jaundice, no new emergent lesions

## 2022-12-18 NOTE — CONSULT NOTE ADULT - PROBLEM SELECTOR RECOMMENDATION 9
1. Maintain EVD @ 10cm of H20  ZERO to the level of the tragus. Keep Unclamped. Notify neurosurgery if 0cc output in 1 hour AND no drainage is acheived when dropping the EVD below the level of the bed momentarily.   Clamp EVD during patient positioning or if patient is being transported for studies.  Ensure family members understand not to alter bed height without your knowledge.   Page neurosurgery directly for any questions regarding drain. Pager [phone]     2. Admit to MICU for Stroke work up  3. Ancef while EVD in Place  4. Keppra 500mg BID    Case d/w Dr. Lockett 1. Maintain EVD @ 10cm of H20  ZERO to the level of the tragus. Keep Unclamped. Notify neurosurgery if 0cc output in 1 hour AND no drainage is acheived when dropping the EVD below the level of the bed momentarily.   Clamp EVD during patient positioning or if patient is being transported for studies.  Ensure family members understand not to alter bed height without your knowledge.   Page neurosurgery directly for any questions regarding drain. Pager [phone]     2. Admit to MICU for Stroke work up  3. Rpt CTH in 4-6hours  4. Ancef while EVD in Place  5. Keppra 500mg BID    Case d/w Dr. Lockett 1. Maintain EVD @ 10cm of H20  ZERO to the level of the tragus. Keep Unclamped and only clamp to transduce ICP every hour. Notify neurosurgery if ICP >20 for 5 consecutive minutes. Notify neurosurgery if 0cc output in 1 hour AND no drainage is acheived when dropping the EVD below the level of the bed momentarily.   Clamp EVD during patient positioning or if patient is being transported for studies.  Ensure family members understand not to alter bed height without your knowledge.   Page neurosurgery directly for any questions regarding drain. Pager [phone]       2. Admit to MICU for Stroke work up  3. Rpt CTH in 4-6hours  4. Ancef while EVD in Place  5. Keppra 500mg BID    Case d/w Dr. Lockett

## 2022-12-18 NOTE — H&P ADULT - NSHPPHYSICALEXAM_GEN_ALL_CORE
GENERAL: Ill-appearing woman  EYES: PERRL, downward gaze preference, eyes cross midline  HENT: No facial asymmetry b/l,  NECK: Supple, trachea midline  LUNG: Nonlabored respirations, no wheezes, rales  CV: RRR, Pulses- Radial: 2+ b/l, no edema  ABDOMEN: Nondistended, nontender  MSK: No visible deformities, nontender extremities  SKIN: No rashes, bruises  NEURO: Lethargic  PSYCH: Normal mood and affect,  b/l UE 1/5 and b/l LE 0/5 GENERAL: Intubated and sedated, thin, small woman  EYES: Pinpoint pupils bilaterally  HENT: Tongue protruding past ET tube  NECK: Supple, trachea midline  LUNG: Nonlabored respirations, no wheezes, rales  CV: RRR, Pulses- Radial: 2+ b/l, no edema  ABDOMEN: Soft, nondistended, nontender  MSK: No visible deformities, nontender extremities  SKIN: No rashes, bruises  NEURO: Sedated  PSYCH: Unable to assess 2/2 sedation

## 2022-12-18 NOTE — ED PROVIDER NOTE - PROGRESS NOTE DETAILS
Cesilia paged for large IPH, will see pt. Bro, PGY-3  Patient transferred to Trauma C and intubated by green team for IPH/airway protection. Xray called for post intubation Xray. NSG at bedside planning for bedside drain and report load with 1gram of keppra. Rony: Interval update patient with GCS of 6 need for intubation for airway protection as well as to facilitate neurosurgical procedure.  Consent obtained from daughter.  Patient preoxygenated 200% with apneic oxygenation nasal cannula running in the background.  Given 100 mg of propofol, 100 no gross succinylcholine, RSI performed no complications 7 5 ET tube placed.  Patient was markedly hypertensive prior to procedure given 100 mcg of fentanyl, post procedurally patient's blood pressure has been in the 1 40-1 70 range requiring close titration with propofol and as needed fentanyl for pain.  BP goal less than 160 per neurosurgery.  Per neurosurgery they would like the patient be placed in the medical ICU.  I spoke with medical ICU who states that this patient to be in the SICU, they will come and speak with neurosurgery consults.  Chest x-ray confirms ET tube in good position above james after being withdrawn 2cm. Bro PGY-3  NSG performing bedside drain. MICU accepted patient to their service

## 2022-12-18 NOTE — CONSULT NOTE ADULT - PROVIDER SPECIALTY LIST ADULT
Isotretinoin and how to manage the side effects  Dry skin:  Use Gentle Face Washes  -Cetaphil PRO Foaming Face Wash Redness Prone Skin  -CeraVe Hydrating Facial Cleanser  -Neutrogena Hydrating Facial Cleanser    Use Facial moisturizers regularly with sunscreen   -CeraVe AM with Sunscreen SPF 30  -Aveeno Ultra-Calming Daily Moisturizer Broad Spectrum SPF 30 - for sensitive skin    Body Moisturizers  -CeraVe Moisturizing Cream  -Cetaphil Cream    When your face is very dry, you can use the body moisturizers on your face, always still apply sunscreen afterwards.     Dry lips:   Use plain Vaseline only, no other chapsticks (ex. Vaseline lip therapy advance healing tube).  You will need to use this consistently throughout the day.    Dry nose/nosebleeds:  Use over the counter nasal saline sprays.  You can also buy thicker gels such as   NeilMed nasogel drip free spray.    Dry eyes:  -use preservative free artificial tears (ex. Refresh classic lubricant eye drops)  -wear glasses instead of contacts when possible    
Neurology
Neurosurgery

## 2022-12-18 NOTE — CONSULT NOTE ADULT - ASSESSMENT
72year old female with PMH of DM, HTN presenting with AMS, found to have L IPH. Pt intubated in ER and R EVD placed at bedside.

## 2022-12-18 NOTE — ED ADULT TRIAGE NOTE - CHIEF COMPLAINT QUOTE
Pt c/o weakness. Per EMS daughter called EMS d/t R sided weakness, slurred speech and lethargy beginning around 30 min ago. Code Stroke called and pt taken directly to CT. PMHx HTN DM

## 2022-12-18 NOTE — CONSULT NOTE ADULT - TIME BILLING
72-year-old ? handed lady evaluated at [facility] on 12/19/2022 with decreased level of consciousness.  History and exam as above.  ROS otherwise negative.  CT head (12/18/2022) to my eye showed a moderate sized left hemispheric hemorrhage, I suspect originating in the thalamus and extending up to the corona radiata, about 4.3 cm in greatest diameter and with IVH and mild hydrocephalus.  Repeat CT head (12/19/2022) to my eye was largely unchanged, now with a right EVD catheter and a small amount of SAH in the region of the catheter.  CTA neck and head (12/18/2022) was unremarkable.    Impression.  Decreased level of consciousness and initially downward gaze deviation, due to a left thalamic hemorrhage with extension to the corona radiata and with IVH, now with an EVD.  The etiology of the hemorrhage is most likely chronic hypertension.  An underlying lesion is unlikely.  Suggest.  Keep blood pressure less than 140/90 (and SBP greater than 110); MRI brain with contrast/MRA neck and head can be done electively in 6-8 weeks; when feasible, PT/OT/speech therapy.

## 2022-12-18 NOTE — ED ADULT NURSE REASSESSMENT NOTE - NS ED NURSE REASSESS COMMENT FT1
Boris RN: Neurosurgery at bedside performing procedure for hemorrhage seen on CT scan. Pt. stable, continued on propofol. Will continue to monitor.

## 2022-12-18 NOTE — STROKE CODE NOTE - NIH STROKE SCALE: 6B. MOTOR LEG, RIGHT, QM
"  History     Chief Complaint:  Head Laceration     The history is provided by the patient and the mother.      Alexandra Gonzalez is a fully immunized 9 year old female who presents with her mother for a head laceration. Around 0630 this morning, the patient was agitated and \"throwing a tantrum\". Her mother was tried to calm her down, though she thrusted backwards, hitting the back of her head on the hard arm of the couch. She cried right away and the area bled, though she did not lose consciousness. The patient has been acting normally since with no episodes of vomiting. EMS was called, who recommended ED presentation.     Allergies:  NKDA    Medications:    Clonidine  citalopram    Past Medical History:    Generalized and separation anxiety     Past Surgical History:    The patient does not have any pertinent past surgical history.     Family History:    No past pertinent family history.     Social History:  The patient was accompanied to the ED by her mother   Fully immunized  PCP: No primary care provider on file.     Review of Systems   Gastrointestinal: Negative for vomiting.   Skin: Positive for wound.   All other systems reviewed and are negative.      Physical Exam     Patient Vitals for the past 24 hrs:   Temp Temp src Pulse Resp SpO2 Weight   05/17/20 0722 98.9  F (37.2  C) Oral 79 18 99 % 27.5 kg (60 lb 10 oz)      Physical Exam    GENERAL:  Pleasant, age appropriate female.   HEENT:   No scalp hematoma or defect to the bony calvarium.      Akers's and Racoon's sign negative.    EYES:  Conjunctiva normal  NECK:   C-spine non-tender with full ROM.      No bony step-off to cervical spine.   CV:    Regular rate and rhythm.     No murmurs, rubs or gallops.    PULM:  Clear to auscultation bilateral.      No respiratory distress.      No subcutaneous emphysema or crepitus.  ABD:   Soft, non-tender, non-distended.      No rebound or guarding.  MSK:    No gross deformity to the extremities.    LYMPH:  No " cervical lymphadenopathy.  NEURO:  Alert and oriented x 3. GCS 15.      Strength is equal and symmetric.    Coordination normal    Gait normal    Speech is clear    Good muscle tone, no atrophy  SKIN:   Warm, dry    2 cm full thickness laceration to the occipital scalp  PSYCH:   Mood is good and affect is appropriate.    Emergency Department Course     Procedures:    Laceration Repair        LACERATION:  A simple clean 2 cm laceration.      LOCATION:  Occipital scalp      FUNCTION:  Distally sensation and circulation are intact.      ANESTHESIA:  LET - Topical      PREPARATION:  Irrigation with Normal Saline and Shur Clens      DEBRIDEMENT:  no debridement      CLOSURE:  Wound was closed with 5 Staples       Emergency Department Course:  Past medical records, nursing notes, and vitals reviewed.    0726 I performed an exam of the patient as documented above.     0825 Patient rechecked and updated.      0836 Procedure performed, as above.     I personally reviewed the care plan with the Patient and answered all related questions prior to discharge.      Impression & Plan     Medical Decision Making:  Alexandra Gonzalez is a 9 year old female who presents to the emergency department today with traumatic scalp laceration.  No evidence for skull fracture or intracerebral hemorrhage that would require CT scan.  This appears to be an isolated soft tissue injury.  Laceration was repaired as described above.  Staples out in 5 to 7 days.  Immunizations are up-to-date.  General wound care instructions provided.    Discharge Diagnosis:    ICD-10-CM    1. Laceration of scalp, initial encounter  S01.01XA      Disposition:  Discharged to home     Scribe Disclosure:  Maritza CANDELARIA, am serving as a scribe at 7:26 AM on 5/17/2020 to document services personally performed by Bhupendra Slade MD based on my observations and the provider's statements to me.       Bhupendra Slade MD  05/17/20 0883     (4) No movement

## 2022-12-18 NOTE — ED PROCEDURE NOTE - CPROC ED GASTRIC INTUB DETAIL1
The orogastric tube (see size above) was inserted via the anatomic location.
The orogastric tube (see size above) was inserted via the anatomic location.

## 2022-12-18 NOTE — CONSULT NOTE ADULT - SUBJECTIVE AND OBJECTIVE BOX
HPI:   72year old female PMHx HTN and DM who presented to the ED for ams. Pt accompanied by daughter who provided further history. Last known normal 1 hour prior. When the daughter called her on the phone, the pt said "my body is numb.. call the ambulance". On arrival to ED pt found to be hypertensive SBP 200s, AMS. Pt found to have L IPH.      PAST MEDICAL & SURGICAL HISTORY:  Allergies  No Known Allergies  Intolerances      ceFAZolin   IVPB 2000 milliGRAM(s) IV Intermittent once  ceFAZolin   IVPB      chlorhexidine 0.12% Liquid 15 milliLiter(s) Oral Mucosa every 12 hours  propofol Infusion 20 MICROgram(s)/kG/Min IV Continuous <Continuous>    SOCIAL HISTORY:  FAMILY HISTORY:    Vital Signs Last 24 Hrs  T(C): 36.2 (18 Dec 2022 15:31), Max: 36.2 (18 Dec 2022 15:31)  T(F): 97.1 (18 Dec 2022 15:31), Max: 97.1 (18 Dec 2022 15:31)  HR: 67 (18 Dec 2022 17:45) (67 - 94)  BP: 182/82 (18 Dec 2022 17:45) (145/81 - 205/90)  BP(mean): --  RR: 14 (18 Dec 2022 17:45) (14 - 20)  SpO2: 100% (18 Dec 2022 17:45) (100% - 100%)    Parameters below as of 18 Dec 2022 17:45  Patient On (Oxygen Delivery Method): ventilator  O2 Flow (L/min): 33      PHYSICAL EXAM:  Prior to Intubation:  Opens eyes to noxious  PERRL  Not Following commands  Grimaces to pain      LABS:                          11.1   11.18 )-----------( 395      ( 18 Dec 2022 15:44 )             36.3     12-18    138  |  97<L>  |  10  ----------------------------<  151<H>  3.9   |  24  |  0.54    Ca    10.5      18 Dec 2022 15:44    TPro  7.9  /  Alb  4.9  /  TBili  0.8  /  DBili  x   /  AST  22  /  ALT  15  /  AlkPhos  82  12-18    PT/INR - ( 18 Dec 2022 15:44 )   PT: 11.2 sec;   INR: 0.97 ratio         PTT - ( 18 Dec 2022 15:44 )  PTT:26.2 sec      RADIOLOGY & ADDITIONAL STUDIES:  < from: CT Brain Stroke Protocol (12.18.22 @ 16:06) >  IMPRESSION:    Acute intraparenchymal hemorrhage within the left frontoparietal lobe and   thalamus. There is mass effect on the left lateral ventricle and left   ambient cistern.    < end of copied text >

## 2022-12-18 NOTE — H&P ADULT - NSHPSOCIALHISTORY_GEN_ALL_CORE
No tobacco, alcohol, drug use  Lives independently, 30 min from daughter who would help her with IADLs  Ambulated independently

## 2022-12-18 NOTE — ED PROVIDER NOTE - ATTENDING CONTRIBUTION TO CARE
72-year-old female past medical history hypertension, diabetes presents for unresponsiveness, altered mental status.  Per daughter was seen by nieces with normal mental status approximately 1 hour prior to arrival.  She then became unresponsive without clear inciting event.  There was some noted difficulty speaking prior to that.  Patient hypertensive to 200 systolic prior to arrival.  Per daughter patient not on anti-platelet or anti-coagulation agents.  I also called pharmacy to confirm no antiplatelet or anticoagulation listed.  Stroke alert called given unresponsiveness and reported decree sensation on right.  Exam as above, abd soft non-tender.    Unresponsive, weakness concern for stroke versus intracranial hemorrhage.  CT Noncon showing intra parenchymal hemorrhage.  Neurosurgery emergently paged.  Patient immediately moved from CT scanner to room preparation for intubation.  Discussion with daughter and explanation on need to intubate for airway protection.  Daughter consents to intubation.  Sedation and pain cont. close BP monitoring. ICUto see

## 2022-12-18 NOTE — H&P ADULT - ATTENDING COMMENTS
Ms. Pemberton is a 71 yo F w/ PMH of DM II, HTN who presented to the ED w/ AMS x 1 day. She was reportedly at her baseline but had a suddent complaint of numbness. They called EMS and her BP was reportedly 200s systolic. Upon arrival tot he ED a stroke code was called. The CT head showed cute intraparenchymal hemorrhage in the L frontoparietal lobe and thalamus with mass effect on the left lateral ventricle/left ambient cistern. Neurosurgery was consulted and an EV drain was placed. She was intubated for the procedure. She was subsequently transferred to the ICU.     On exam she is intubated, sedated, RASS -3 currently. Pinpoint pupils, symmetric bilaterally  Lungs are clear to auscultation. Abd soft, no grimace to palpation. Extremities warm, no edema     Pertinent labs and imaging reviewed.     A/P  Ms. Pemberton is a 71 yo F w/ acute intraparenchymal hemorrhage with mass effect and midline shift in the setting of hypertensive emergency. Neurosurgery was consulted. An EV drain was placed. Pt was intubated for airway protection and for the procedure.     - maintain EV drain - level at 10   - monitor output  - currently sedated - will discuss with neurology/neurosurgery when to lighten sedation   - BP goal systolic <160. Currently on nicardipine gtt  - start tube feeds   - SCDs for ppx   - ancef IV for prophylaxis while drain is in place  - Keppra IV for seizure ppx   - appreciate neurology and neurosurgery guidance

## 2022-12-18 NOTE — H&P ADULT - ASSESSMENT
Ms. Pemberton is a 73 YO woman with PMH of HTN, DM who presented to the ED with 1 day history of AMS, found to have large IPH on CT scan, now intubated and s/p bedside drain.      #Neuro  IPH  - S/p external ventricular drain in ED with neurosurgery  - Drain management per neurosurgery recommendations  - Keppra load in ED with 1 gram    #CV  Hypertensive to 200s systolic on admission  - BP has come down     Troponins <6  - EKG wnl    #PULMONARY   Intubated for airway protection  - Saturating well on room air  - RVP negative  - CXR with clear lungs    #INFECTIOUS DISEASE   Febrile, leukocytosis likely reactive in setting of IPH  - Given cefazolin in ED as pre-procedure prophylaxis; per neurosurgery will continue while drain is in place    #GI  - NPO    #RENAL  Lactate elevated   - ISO ischemia, continue to trend    #Endocrine   Blood glucose elevated on admission  - Will continue to follow up FS q6h while NPO  - Obtain A1c    #Hematology   Anemic to 11.1, unknown baseline  - Continue to monitor CBC  - No chemical anticoagulation 2/2 IPH    Platelet morphology noted to be abnormal  - Repeat CBC  - Follow up peripheral smear    #DVT AND GI PROPHYLAXIS  DVT prophylaxis: SCDs    #Code Status: Full code, pending further discussion with family    Obtain full medication reconciliation from family in AM   Ms. Pemberton is a 71 YO woman with PMH of HTN, DM who presented to the ED with 1 day history of AMS, found to have large IPH on CT scan, now intubated and s/p bedside drain.      #Neuro  IPH  - S/p external ventricular drain in ED with neurosurgery  - Drain management per neurosurgery recommendations  - Keppra load in ED with 1 gram  - Repeat CTH at 9:30 PM 12/18 as per NSGY    #CV  Hypertensive to 200s systolic on admission  - BP has come down   - Goal SBP 160s    Troponins <6  - EKG wnl    #PULMONARY   Intubated for airway protection  - Was saturating well on room air, wean ventilator as able  - RVP negative  - CXR with clear lungs    #INFECTIOUS DISEASE   Febrile, leukocytosis likely reactive in setting of IPH  - Given cefazolin in ED as pre-procedure prophylaxis; per neurosurgery will continue while drain is in place    #GI  - NPO for now    #RENAL  Lactate elevated   - ISO ischemia, continue to trend    #Endocrine   Blood glucose elevated on admission  - Will continue to follow up FS q6h while NPO  - Obtain A1c    #Hematology   Anemic to 11.1, unknown baseline  - Continue to monitor CBC  - No chemical anticoagulation 2/2 IPH    Platelet morphology noted to be abnormal  - Repeat CBC  - Follow up peripheral smear    #DVT AND GI PROPHYLAXIS  DVT prophylaxis: SCDs    #Code Status: Full code, pending further discussion with family    Obtain full medication reconciliation from family in AM   Ms. Pemberton is a 73 YO woman with PMH of HTN, DM who presented to the ED with 1 day history of AMS, found to have large IPH on CT scan, now intubated and s/p bedside drain.      #Neuro  IPH  - S/p external ventricular drain in ED with neurosurgery  - Drain management per neurosurgery recommendations  - Keppra load in ED with 1 gram  - Repeat CTH at 9:30 PM 12/18 as per NSGY    #CV  Hypertensive to 200s systolic on admission  - BP has come down   - Goal SBP 160s    Troponins <6  - EKG wnl    #PULMONARY   Intubated for airway protection  - Was saturating well on room air, wean ventilator as able  - RVP negative  - CXR with clear lungs    #INFECTIOUS DISEASE   Febrile, leukocytosis likely reactive in setting of IPH  - Given cefazolin in ED as pre-procedure prophylaxis; per neurosurgery will continue while drain is in place    #GI  - NPO for now    #RENAL  Lactate elevated   - ISO ischemia, continue to trend    #Endocrine   Blood glucose elevated on admission  - Will continue to follow up FS q6h while NPO  - Obtain A1c    #Hematology   Anemic to 11.1, unknown baseline  - Continue to monitor CBC  - No chemical anticoagulation 2/2 IPH    Platelet morphology noted to be abnormal  - Repeat CBC  - Follow up peripheral smear    #DVT AND GI PROPHYLAXIS  DVT prophylaxis: SCDs    #Code Status: Full code, pending further discussion with family    Obtain full medication reconciliation from pharmacy in AM - [facility] pharmacy [phone]

## 2022-12-18 NOTE — CONSULT NOTE ADULT - ASSESSMENT
72y (1950) woman with a PMHx significant for HTN and DM who presented to the ED for ams. Pt accompanied by daughter who provided further history. Per daughter, pt was fine before daughter left the house. When the daughter called her on the phone, the pt said "my body is numb.. call the ambulance". No further history able to be provided as daughter is very tearful and distraught. Unknown if any falls or trauma, or any blood thinners.     Impression:   diffuse cerebral dysfunciton 2/2 bleed in left BG area. Pending official read    Recommendation:  Consult NS     Case to be discussed with stroke team    72y (1950) woman with a PMHx significant for HTN and DM who presented to the ED for ams. Pt accompanied by daughter who provided further history. Per daughter, pt was fine before daughter left the house. When the daughter called her on the phone, the pt said "my body is numb.. call the ambulance". No further history able to be provided as daughter is very tearful and distraught. Unknown if any falls or trauma, or any blood thinners.     Impression:   diffuse cerebral dysfunciton 2/2 bleed in left BG area. Pending official read    Recommendation:  Consult NS   BP goal sbp less than 160     Case to be discussed with stroke team    72y (1950) woman with a PMHx significant for HTN and DM who presented to the ED for ams. Pt accompanied by daughter who provided further history. Per daughter, pt was fine before daughter left the house. When the daughter called her on the phone, the pt said "my body is numb.. call the ambulance". No further history able to be provided as daughter is very tearful and distraught. Unknown if any falls or trauma, or any blood thinners.     Impression:   Diffuse cerebral dysfunction 2/2 bleed in left IPH    Recommendation:  Consult NS   BP goal sbp less than 160     Case discussed with stroke team    72y (1950) woman with a PMHx significant for HTN and DM who presented to the ED for ams. Pt accompanied by daughter who provided further history. Per daughter, pt was fine before daughter left the house. When the daughter called her on the phone, the pt said "my body is numb.. call the ambulance". No further history able to be provided as daughter is very tearful and distraught. Unknown if any falls or trauma, or any blood thinners.     Impression:   Diffuse cerebral dysfunction 2/2 left IPH    Recommendation:  Consult NS   BP goal sbp less than 160     Case discussed with stroke team    72y (1950) woman with a PMHx significant for HTN and DM who presented to the ED for ams. Pt accompanied by daughter who provided further history. Per daughter, pt was fine before daughter left the house. When the daughter called her on the phone, the pt said "my body is numb.. call the ambulance". No further history able to be provided as daughter is very tearful and distraught. Unknown if any falls or trauma, or any blood thinners.     Impression:   Diffuse cerebral dysfunction 2/2 left IPH    Recommendation:  Consult NS   BP goal sbp less than 160     Case discussed with stroke team      ADDENDUM: ICH score of approx 2

## 2022-12-18 NOTE — H&P ADULT - HISTORY OF PRESENT ILLNESS
Ms. Pemberton is a 71 YO woman with PMH of HTN, DM who presented to the ED with 1 day history of AMS. Per daughter, at 2:30 PM patient had appeared at her baseline mental status, but when the daughter called the patient later in the day, patient noted her body was numb and asked her to call the police.     During transport with EMS, her SBP was in the 190-200 range. In the ED she was noted to have NIHSS score of 24. Vitals were noted to be temp 97.1, HR 83, /90, RR 18, saturating 100% on room air. Code stroke was called and CT showed acute intraparenchymal hemorrhage in the L frontoparietal lobe and thalamus with mass effect on the left lateral ventricle/left ambient cistern. Neurosurgey was called, she was intubated due to need for airway placement. placed a bedside drain and loaded with 1 gram kepra.

## 2022-12-18 NOTE — ED PROCEDURE NOTE - CPROC ED TIME OUT STATEMENT1
“Patient's name, , procedure and correct site were confirmed during the [facility] Timeout.”
“Patient's name, , procedure and correct site were confirmed during the [facility] Timeout.”
“Patient's name, , procedure and correct site were confirmed during the [facility] Timeout.”

## 2022-12-18 NOTE — CONSULT NOTE ADULT - SUBJECTIVE AND OBJECTIVE BOX
Neurology - Consult Note    -  Spectra: [phone] ([facility]), [phone] ([facility])  -    HPI: Patient BRENDA SHIPLEY is a 72y (1950) woman with a PMHx significant for HTN and DM who presented to the ED for ams. Pt accompanied by daughter who provided further history. Per daughter, pt was fine before daughter left the house. When the daughter called her on the phone, the pt said "my body is numb.. call the ambulance". No further history able to be provided as daughter is very tearful and distraught. Unknown if any falls or trauma, or any blood thinners.   MRS 0  LKN 1430  NIHSS 24   nno tpa due to IPH    Review of Systems:    unable to perform due to change in mental status     Allergies:      PMHx/PSHx/Family Hx: As above, otherwise see below          Medications:  MEDICATIONS  (STANDING):    MEDICATIONS  (PRN):      Vitals:  T(C): 36.2 (12-18-22 @ 15:31), Max: 36.2 (12-18-22 @ 15:31)  HR: 83 (12-18-22 @ 15:31) (83 - 83)  BP: 205/90 (12-18-22 @ 15:31) (205/90 - 205/90)  RR: 18 (12-18-22 @ 15:31) (18 - 18)  SpO2: 100% (12-18-22 @ 15:31) (100% - 100%)    Physical Examination:    General - NAD, eyes closed mostly   Cardiovascular - Peripheral pulses palpable, no edema    Neurologic Exam:  Mental status - Awake, frequent verbal or pain stimuli needed to arouse     Cranial nerves - PERRL, downward gaze preference, eyes cross midline  o facial asymmetry b/l,    Motor - Normal bulk and tone throughout.   b/l UE 1/5 and b/l LE 0/5     Sensation griminess to pain on LUE           Labs:          CAPILLARY BLOOD GLUCOSE      POCT Blood Glucose.: 155 mg/dL (18 Dec 2022 15:38)            Radiology:     Neurology - Consult Note    -  Spectra: [phone] ([facility]), [phone] ([facility])  -    HPI: Patient BRENDA SHIPLEY is a 72y (1950) woman with a PMHx significant for HTN and DM who presented to the ED for ams. Pt accompanied by daughter who provided further history. Per daughter, pt was fine before daughter left the house. When the daughter called her on the phone, the pt said "my body is numb.. call the ambulance". No further history able to be provided as daughter is very tearful and distraught. Unknown if any falls or trauma, or any blood thinners.   MRS 0  LKN 1430  NIHSS 24   nno tpa due to IPH    Review of Systems:    unable to perform due to change in mental status     Allergies:      PMHx/PSHx/Family Hx: As above, otherwise see below          Medications:  MEDICATIONS  (STANDING):    MEDICATIONS  (PRN):      Vitals:  T(C): 36.2 (12-18-22 @ 15:31), Max: 36.2 (12-18-22 @ 15:31)  HR: 83 (12-18-22 @ 15:31) (83 - 83)  BP: 205/90 (12-18-22 @ 15:31) (205/90 - 205/90)  RR: 18 (12-18-22 @ 15:31) (18 - 18)  SpO2: 100% (12-18-22 @ 15:31) (100% - 100%)    Physical Examination:    General - NAD, eyes closed mostly   Cardiovascular - Peripheral pulses palpable, no edema    Neurologic Exam:  Mental status - Awake, frequent verbal or pain stimuli needed to arouse     Cranial nerves - PERRL, downward gaze preference, eyes cross midline  o facial asymmetry b/l,    Motor - Normal bulk and tone throughout.   b/l UE 1/5 and b/l LE 0/5     Sensation griminess to pain on LUE           Labs:          CAPILLARY BLOOD GLUCOSE      POCT Blood Glucose.: 155 mg/dL (18 Dec 2022 15:38)            Radiology:    < from: CT Brain Stroke Protocol (12.18.22 @ 16:06) >    IMPRESSION:    Acute intraparenchymal hemorrhage within the left frontoparietal lobe and   thalamus. There is mass effect on the left lateral ventricle and left   ambient cistern.    < end of copied text >  < from: CT Angio Neck Stroke Protocol w/ IV Cont (12.18.22 @ 16:05) >  IMPRESSION:    Redemonstration of acute intraparenchymal hemorrhage within the left   frontoparietal lobe and thalamus.    No evidence of aneurysm.    No evidence of major vessel occlusion or stenosis.    < end of copied text >

## 2022-12-19 NOTE — PROGRESS NOTE ADULT - ATTENDING COMMENTS
Patient examined and case reviewed in detail on bedside rounds  Critically ill and unstable on vent with large ICH s/p ventricular drainage For urgent repeat head CT and possible transfer to NS neuro unit

## 2022-12-19 NOTE — PROGRESS NOTE ADULT - ASSESSMENT
Ms. Pemberton is a 71 YO woman with PMH of HTN, DM who presented to the ED with 1 day history of AMS, found to have large IPH on CT scan, now intubated and s/p bedside drain.      #Neuro  IPH  - S/p external ventricular drain in ED with neurosurgery  - Drain management per neurosurgery recommendations  - Keppra load in ED with 1 gram  - Repeat CTH at 9:30 PM 12/18 as per NSGY    #CV  Hypertensive to 200s systolic on admission  - BP has come down   - Goal SBP 160s    Troponins <6  - EKG wnl    #PULMONARY   Intubated for airway protection  - Was saturating well on room air, wean ventilator as able  - RVP negative  - CXR with clear lungs    #INFECTIOUS DISEASE   Febrile, leukocytosis likely reactive in setting of IPH  - Given cefazolin in ED as pre-procedure prophylaxis; per neurosurgery will continue while drain is in place    #GI  - NPO for now    #RENAL  Lactate elevated   - ISO ischemia, continue to trend    #Endocrine   Blood glucose elevated on admission  - Will continue to follow up FS q6h while NPO  - Obtain A1c    #Hematology   Anemic to 11.1, unknown baseline  - Continue to monitor CBC  - No chemical anticoagulation 2/2 IPH    Platelet morphology noted to be abnormal  - Repeat CBC  - Follow up peripheral smear    #DVT AND GI PROPHYLAXIS  DVT prophylaxis: SCDs    #Code Status: Full code, pending further discussion with family    Obtain full medication reconciliation from pharmacy in AM - [facility] pharmacy [phone]   Ms. Pemberton is a 71 YO woman with PMH of HTN, DM who presented to the ED with 1 day history of AMS, found to have large IPH on CT scan, now intubated and s/p bedside drain.      #Neuro  IPH  - S/p external ventricular drain in ED with neurosurgery  - Drain management per neurosurgery recommendations  - Keppra load in ED with 1 gram  - Repeat CTH at 9:30 PM 12/18 with new R sided subarachnoid hemorrhage, similar-appearing evolving acute parenchymal hematoma centered within the left thalamocapsular junction with intraventricular extension.  - Repeat CTH 12/19 with no change     #CV  (resolved) Hypertensive to 200s systolic on admission  - BP has come down    Soft BPs  -BPs 95/51- per neuro goal -120  -started lilliana to maintain BP    Troponins <6  - EKG wnl    #PULMONARY   Intubated for airway protection  - currently requiring vent  - RVP negative  - CXR with clear lungs    #INFECTIOUS DISEASE   Febrile, leukocytosis likely reactive in setting of IPH  - Given cefazolin in ED as pre-procedure prophylaxis; per neurosurgery will continue while drain is in place    #GI  - NPO for now    #RENAL  Lactate elevated   - ISO ischemia, continue to trend    Goal Na of 145-150 iso hemorrhage  -c/w hypertonic saline for goal 145-150    #Endocrine   Blood glucose elevated on admission  - Will continue to follow up FS q6h while NPO  - A1c 6.7  - ISS    #Hematology   Anemic to 11.1, unknown baseline  - Continue to monitor CBC  - No chemical anticoagulation 2/2 IPH    Platelet morphology noted to be abnormal  - Repeat CBC  - Follow up peripheral smear  - normal range    #DVT AND GI PROPHYLAXIS  DVT prophylaxis: SCDs    #Code Status: Full code, pending further discussion with family    Obtain full medication reconciliation from pharmacy in  - [facility] pharmacy [phone]   Ms. Pemberton is a 71 YO woman with PMH of HTN, DM who presented to the ED with 1 day history of AMS, found to have large IPH on CT scan, now intubated and s/p bedside drain.      #Neuro  IPH  - S/p external ventricular drain in ED with neurosurgery  - Drain management per neurosurgery recommendations  - Keppra load in ED with 1 gram  - Repeat CTH at 9:30 PM 12/18 with new R sided subarachnoid hemorrhage, similar-appearing evolving acute parenchymal hematoma centered within the left thalamocapsular junction with intraventricular extension.  - Repeat CTH 12/19 with no change   - being transferred to [facility] for angiogram    #CV  (resolved) Hypertensive to 200s systolic on admission  - BP has come down    Soft BPs  -BPs 95/51- per neuro goal -120  -started lilliana to maintain BP    Troponins <6  - EKG wnl    #PULMONARY   Intubated for airway protection  - currently requiring vent  - RVP negative  - CXR with clear lungs    #INFECTIOUS DISEASE   Febrile, leukocytosis likely reactive in setting of IPH  - Given cefazolin in ED as pre-procedure prophylaxis; per neurosurgery will continue while drain is in place    #GI  - NPO for now    #RENAL  Lactate elevated   - ISO ischemia, continue to trend    Goal Na of 145-150 iso hemorrhage  -c/w hypertonic saline for goal 145-150    #Endocrine   Blood glucose elevated on admission  - Will continue to follow up FS q6h while NPO  - A1c 6.7  - ISS    #Hematology   Anemic to 11.1, unknown baseline  - Continue to monitor CBC  - No chemical anticoagulation 2/2 IPH    Platelet morphology noted to be abnormal  - Repeat CBC  - Follow up peripheral smear  - normal range    #DVT AND GI PROPHYLAXIS  DVT prophylaxis: SCDs    #Code Status: Full code, pending further discussion with family    Obtain full medication reconciliation from pharmacy in  - [facility] pharmacy [phone]

## 2022-12-19 NOTE — H&P ADULT - ASSESSMENT
Patient is a 72 year old female with a history of hypertension and diabetes that presented to [facility] ED with altered mental status.  Noted to be hypertensive to SBP 200s en route to hospital.  CT head showed left thalamic intraparenchymal hemorrhage.  Intubated for airway protection.  EVD placed.  Now transferred to [facility] for neurosurgical management.      PLAN:  Neuro:   -admit to nscu  -q1 hour neuro checks  -evd @ 10  -ct head in am  -fentanyl/propofol for sedation  -pt/ot    Respiratory:   -intubated  -prvc: 350/12/5/40    CV:  -keep sbp 100-150  -wean off lilliana    Endocrine:   -FIORDALIZA for glucose control  -keep fingersticks 100-180    Heme/Onc:    -scds for dvt prophylaxis    Renal:   -3% @ 30 cc/hr  -goal sodium 140-145    ID:   -afebrile    GI:   -npo/ivf  -protonix for gi prophylaxis Patient is a 72 year old female with a history of hypertension and diabetes that presented to [facility] ED with altered mental status.  Noted to be hypertensive to SBP 200s en route to hospital.  CT head showed left thalamic intraparenchymal hemorrhage.  Intubated for airway protection.  EVD placed.  Now transferred to [facility] for neurosurgical management.      PLAN:  Neuro:   -admit to nscu  -q1 hour neuro checks  -evd @ 10  -ct head in am  -fentanyl/propofol for sedation  -pt/ot    Respiratory:   -intubated  -prvc: 350/12/5/40    CV:  -keep sbp 100-150  -wean off lilliana    Endocrine:   -FIORDALIZA for glucose control  -keep fingersticks 100-180    Heme/Onc:    -scds for dvt prophylaxis  -lower extremity duplex to rule out dvt on admission - pending    Renal:   -3% @ 30 cc/hr  -goal sodium 140-145    ID:   -afebrile    GI:   -npo/ivf  -protonix for gi prophylaxis

## 2022-12-19 NOTE — PROGRESS NOTE ADULT - ASSESSMENT
ASSESSMENT/PLAN:    72F hx of HTN, DM who initially presented to [facility] 12/18 for worsening HA and Rt hemplegia LKW 230pm, [id number] NIHSS 24, CTH with L thalamic heme w/ IVH and HCP w/ extension into midbrain and terporoparietal lobe, intubated in ED for airway protecton and EVD placed, admitted to OSH MICU then on interval CTH with expansion of heme and ?spot sign on CTA, txer to [facility] for possible angio.    NEURO:  - neuro checks q1h  - no need for immediate angio per nsg  - EVD@10, ICP goal 22, monitor output  - MRI brain WWO when able  - repeat CTH AM  - pain control  - sedation precedex for vent synchrony    CVS:  - SBP goal   - TTE  - tele  - EKG, A1c  - cardene prn to goal  - Rt axillary [place] placed 12/18    PULM:  Intubated at OSH ED for airway protection  - ETT to vent  - CXR daily  - mucomyst, nebs  - ABG    RENAL:  - Fluids: HTS 3%@30  - daily IOs  - IOs daily  - Na 145-155  - BMPq6h    GI:  - Diet: NPO for now  - GI prophylaxis: PPI while intubated  - Bowel regimen: miralax, senna    ENDO:   - FS goal 120-180    HEME/ONC:  - SCDs  - Chemoppx: hold d/t fresh heme    ID:  - monitor for fevers

## 2022-12-19 NOTE — DISCHARGE NOTE NURSING/CASE MANAGEMENT/SOCIAL WORK - NSDCPEFALRISK_GEN_ALL_CORE
For information on Fall & Injury Prevention, visit: [email or web address] OR  [email or web address] OR  https://www.cdc.gov/steadi/patient.html

## 2022-12-19 NOTE — H&P ADULT - NSHPLABSRESULTS_GEN_ALL_CORE
CT head 12/19: right frontal evd in place w/ small pericatheter hemorrhage, left basal ganglia hemorrhage w/ intraventricular extension (similar to prior), similar scattered sulcal subarachnoid hemorrhage

## 2022-12-19 NOTE — PROGRESS NOTE ADULT - SUBJECTIVE AND OBJECTIVE BOX
PAST 24HR EVENTS: s/p EVd drain placement  rpt CTH w/ worsening IPH/ IVH. ICP 6-9, EVD draining bloody csf, on cardeine gtt.      HPI: 72y Female HPI:  Ms. Pemberton is a 73 YO woman with PMH of HTN, DM who presented to the ED with 1 day history of AMS. Per daughter, at 2:30 PM patient had appeared at her baseline mental status, but when the daughter called the patient later in the day, patient noted her body was numb and asked her to call the police.     During transport with EMS, her SBP was in the 190-200 range. In the ED she was noted to have NIHSS score of 24. Vitals were noted to be temp 97.1, HR 83, /90, RR 18, saturating 100% on room air. Code stroke was called and CT showed acute intraparenchymal hemorrhage in the L frontoparietal lobe and thalamus with mass effect on the left lateral ventricle/left ambient cistern. Neurosurgey was called, she was intubated due to need for airway placement. placed a bedside drain and loaded with 1 gram kepra.    (18 Dec 2022 18:16)      PHYSICAL EXAM: intubated, sedated w/ propofol, fentanyl  pupils pinpt  off sedation moves left side   ICP 6-8  EVd output: 162cc    Vital Signs Last 24 Hrs  T(C): 36.8 (19 Dec 2022 00:00), Max: 36.9 (18 Dec 2022 19:30)  T(F): 98.2 (19 Dec 2022 00:00), Max: 98.4 (18 Dec 2022 19:30)  HR: 88 (19 Dec 2022 03:23) (64 - 94)  BP: 104/55 (19 Dec 2022 03:00) (89/54 - 205/90)  BP(mean): 70 (19 Dec 2022 03:00) (66 - 108)  RR: 12 (19 Dec 2022 03:00) (12 - 20)  SpO2: 100% (19 Dec 2022 03:23) (100% - 100%)    Parameters below as of 19 Dec 2022 03:00  Patient On (Oxygen Delivery Method): ventilator    O2 Concentration (%): 40      MEDS:   acetaminophen   IVPB .. 1000 milliGRAM(s) IV Intermittent once  ceFAZolin   IVPB 2000 milliGRAM(s) IV Intermittent every 8 hours  ceFAZolin   IVPB      chlorhexidine 0.12% Liquid 15 milliLiter(s) Oral Mucosa every 12 hours  chlorhexidine 4% Liquid 1 Application(s) Topical <User Schedule>  dextrose 5%. 1000 milliLiter(s) IV Continuous <Continuous>  dextrose 50% Injectable 12.5 Gram(s) IV Push once  dextrose 50% Injectable 25 Gram(s) IV Push once  fentaNYL   Infusion. 0.5 MICROgram(s)/kG/Hr IV Continuous <Continuous>  glucagon  Injectable 1 milliGRAM(s) IntraMuscular once  insulin lispro (ADMELOG) corrective regimen sliding scale   SubCutaneous every 6 hours  levETIRAcetam  Solution 500 milliGRAM(s) Oral two times a day  niCARdipine Infusion 5 mG/Hr IV Continuous <Continuous>  propofol Infusion 20 MICROgram(s)/kG/Min IV Continuous <Continuous>      LABS:                        10.1   15.38 )-----------( 373      ( 18 Dec 2022 20:20 )             34.1     12-18    135  |  95<L>  |  10  ----------------------------<  194<H>  4.5   |  22  |  0.49<L>    Ca    9.6      18 Dec 2022 20:20  Phos  4.8     12-18  Mg     1.80     12-18    TPro  8.0  /  Alb  4.7  /  TBili  0.6  /  DBili  x   /  AST  29  /  ALT  15  /  AlkPhos  80  12-18    PT/INR - ( 18 Dec 2022 20:20 )   PT: 11.5 sec;   INR: 0.99 ratio         PTT - ( 18 Dec 2022 20:20 )  PTT:28.9 sec    RADIOLOGY:

## 2022-12-19 NOTE — H&P ADULT - NSHPPHYSICALEXAM_GEN_ALL_CORE
General: intubated, sedated  Heart: +s1, s2  Lungs: cta b/l  Neuro: intubated, sedated, eyes closed, pinpoint pupils (likely secondary to fentanyl), +overbreaths, +cough/gag, LUE localizes, RUE 0/5, LLE withdraws, RLE triple flexion

## 2022-12-19 NOTE — DISCHARGE NOTE PROVIDER - NSDCCPCAREPLAN_GEN_ALL_CORE_FT
PRINCIPAL DISCHARGE DIAGNOSIS  Diagnosis: ICH (intracerebral hemorrhage)  Assessment and Plan of Treatment: You came to the hospital for altered mental status. You were found to have a significant bleed in your brain. Neurosurgeons came to see and put in a drain to relieve some of the pressue in the brain. They recommended you receive an angiogram for further examination of the bleed and you were transferred to [facility].      SECONDARY DISCHARGE DIAGNOSES  Diagnosis: Hypotension  Assessment and Plan of Treatment: Your blood pressures were on the lower side, so you were started on a drip to keep your blood pressure at a better range for the bleed.

## 2022-12-19 NOTE — PATIENT PROFILE ADULT - FALL HARM RISK - HARM RISK INTERVENTIONS

## 2022-12-19 NOTE — PROGRESS NOTE ADULT - SUBJECTIVE AND OBJECTIVE BOX
Interval events:    VITALS:  T(C): , Max: 38.2 (12-19-22 @ 04:00)  HR:  (64 - 114)  BP:  (83/53 - 191/95)  ABP:  (105/57 - 130/63)  RR:  (11 - 14)  SpO2:  (99% - 100%)  Wt(kg): --  Device: Avea, Mode: AC/ CMV (Assist Control/ Continuous Mandatory Ventilation), RR (machine): 12, TV (machine): 350, FiO2: 40, PEEP: 5, ITime: 1, MAP: 8, PIP: 14    12-19-22 @ 07:01  -  12-19-22 @ 19:21  --------------------------------------------------------  IN: 174.4 mL / OUT: 143 mL / NET: 31.4 mL      LABS:  Na: 145 (12-19 @ 16:58), 139 (12-19 @ 08:55), 135 (12-18 @ 20:20), 138 (12-18 @ 15:44)  K: 3.7 (12-19 @ 16:58), 3.9 (12-19 @ 08:55), 4.5 (12-18 @ 20:20), 3.9 (12-18 @ 15:44)  Cl: 109 (12-19 @ 16:58), 101 (12-19 @ 08:55), 95 (12-18 @ 20:20), 97 (12-18 @ 15:44)  CO2: 26 (12-19 @ 16:58), 26 (12-19 @ 08:55), 22 (12-18 @ 20:20), 24 (12-18 @ 15:44)  BUN: 12 (12-19 @ 16:58), 11 (12-19 @ 08:55), 10 (12-18 @ 20:20), 10 (12-18 @ 15:44)  Cr: 0.49 (12-19 @ 16:58), 0.59 (12-19 @ 08:55), 0.49 (12-18 @ 20:20), 0.54 (12-18 @ 15:44)  Glu: 182(12-19 @ 16:58), 114(12-19 @ 08:55), 194(12-18 @ 20:20), 151(12-18 @ 15:44)    Hgb: 9.0 (12-19 @ 16:58), 10.1 (12-18 @ 20:20), 11.1 (12-18 @ 15:44)  Hct: 29.7 (12-19 @ 16:58), 34.1 (12-18 @ 20:20), 36.3 (12-18 @ 15:44)  WBC: 11.12 (12-19 @ 16:58), 15.38 (12-18 @ 20:20), 11.18 (12-18 @ 15:44)  Plt: 373 (12-19 @ 16:58), 373 (12-18 @ 20:20), 395 (12-18 @ 15:44)    INR: 0.99 12-18-22 @ 20:20, 0.97 12-18-22 @ 15:44  PTT: 28.9 12-18-22 @ 20:20, 26.2 12-18-22 @ 15:44    MEDICATIONS:  chlorhexidine 0.12% Liquid 15 milliLiter(s) Oral Mucosa every 12 hours  chlorhexidine 4% Liquid 1 Application(s) Topical <User Schedule>  dexMEDEtomidine Infusion 0.2 MICROgram(s)/kG/Hr IV Continuous <Continuous>  fentaNYL   Infusion... 1.5 MICROgram(s)/kG/Hr IV Continuous <Continuous>  insulin lispro (ADMELOG) corrective regimen sliding scale   SubCutaneous every 6 hours  levETIRAcetam  IVPB 500 milliGRAM(s) IV Intermittent every 12 hours  levothyroxine Injectable 12.5 MICROGram(s) IV Push at bedtime  pantoprazole  Injectable 40 milliGRAM(s) IV Push daily  phenylephrine    Infusion 0.3 MICROgram(s)/kG/Min IV Continuous <Continuous>  potassium chloride   Solution 20 milliEquivalent(s) Enteral Tube once  propofol Infusion 35 MICROgram(s)/kG/Min IV Continuous <Continuous>  sodium chloride 3%. 500 milliLiter(s) IV Continuous <Continuous>    EXAMINATION:  General:  in NAD  HEENT:  MMM  Neuro:  awake, alert, oriented x 3, follows commands, EOMI, face symmetric, no PD, DF 5/5   Cards:  RRR  Respiratory:  no respiratory distress  Abdomen:  soft  Extremities:  no LE edema    Assessment/Plan:   73 yo woman with HTN and DM, presented to [facility] 12/18 with HA and R hemipegia, NIHSS 24, CTH with L thalamic ICH with  IVH with extension into midbrain and temporoparietal lobe. S/p EVD. CTA with     c/w EVD at 10cm H2O  MRI brain w/wo when able  repeat CTH AM  NPO for potential angiogram tomorrow   SBP goal 100-160  Na 145-155, on HTS at 30cc/hr    Rt axillary [place]     Patient seen and examined by attending on 12/19/2022.    Patient is critically ill due to ? and at high risk for neurological deterioration or death due to:   Interval events:  Patient seen on evening rounds, no acute events.  On low dose lilliana. On Precedex 0.4.   Febrile to 38.3, cultures sent.     VITALS:  T(C): , Max: 38.2 (12-19-22 @ 04:00)  HR:  (64 - 114)  BP:  (83/53 - 191/95)  ABP:  (105/57 - 130/63)  RR:  (11 - 14)  SpO2:  (99% - 100%)  Wt(kg): --  Device: Avea, Mode: AC/ CMV (Assist Control/ Continuous Mandatory Ventilation), RR (machine): 12, TV (machine): 350, FiO2: 40, PEEP: 5, ITime: 1, MAP: 8, PIP: 14    12-19-22 @ 07:01  -  12-19-22 @ 19:21  --------------------------------------------------------  IN: 174.4 mL / OUT: 143 mL / NET: 31.4 mL      LABS:  Na: 145 (12-19 @ 16:58), 139 (12-19 @ 08:55), 135 (12-18 @ 20:20), 138 (12-18 @ 15:44)  K: 3.7 (12-19 @ 16:58), 3.9 (12-19 @ 08:55), 4.5 (12-18 @ 20:20), 3.9 (12-18 @ 15:44)  Cl: 109 (12-19 @ 16:58), 101 (12-19 @ 08:55), 95 (12-18 @ 20:20), 97 (12-18 @ 15:44)  CO2: 26 (12-19 @ 16:58), 26 (12-19 @ 08:55), 22 (12-18 @ 20:20), 24 (12-18 @ 15:44)  BUN: 12 (12-19 @ 16:58), 11 (12-19 @ 08:55), 10 (12-18 @ 20:20), 10 (12-18 @ 15:44)  Cr: 0.49 (12-19 @ 16:58), 0.59 (12-19 @ 08:55), 0.49 (12-18 @ 20:20), 0.54 (12-18 @ 15:44)  Glu: 182(12-19 @ 16:58), 114(12-19 @ 08:55), 194(12-18 @ 20:20), 151(12-18 @ 15:44)    Hgb: 9.0 (12-19 @ 16:58), 10.1 (12-18 @ 20:20), 11.1 (12-18 @ 15:44)  Hct: 29.7 (12-19 @ 16:58), 34.1 (12-18 @ 20:20), 36.3 (12-18 @ 15:44)  WBC: 11.12 (12-19 @ 16:58), 15.38 (12-18 @ 20:20), 11.18 (12-18 @ 15:44)  Plt: 373 (12-19 @ 16:58), 373 (12-18 @ 20:20), 395 (12-18 @ 15:44)    INR: 0.99 12-18-22 @ 20:20, 0.97 12-18-22 @ 15:44  PTT: 28.9 12-18-22 @ 20:20, 26.2 12-18-22 @ 15:44    MEDICATIONS:  chlorhexidine 0.12% Liquid 15 milliLiter(s) Oral Mucosa every 12 hours  chlorhexidine 4% Liquid 1 Application(s) Topical <User Schedule>  dexMEDEtomidine Infusion 0.2 MICROgram(s)/kG/Hr IV Continuous <Continuous>  fentaNYL   Infusion... 1.5 MICROgram(s)/kG/Hr IV Continuous <Continuous>  insulin lispro (ADMELOG) corrective regimen sliding scale   SubCutaneous every 6 hours  levETIRAcetam  IVPB 500 milliGRAM(s) IV Intermittent every 12 hours  levothyroxine Injectable 12.5 MICROGram(s) IV Push at bedtime  pantoprazole  Injectable 40 milliGRAM(s) IV Push daily  phenylephrine    Infusion 0.3 MICROgram(s)/kG/Min IV Continuous <Continuous>  potassium chloride   Solution 20 milliEquivalent(s) Enteral Tube once  propofol Infusion 35 MICROgram(s)/kG/Min IV Continuous <Continuous>  sodium chloride 3%. 500 milliLiter(s) IV Continuous <Continuous>    EXAMINATION:  General:  in NAD  HEENT:  with ET tube   Neuro:  eyes close, no EO to noxious, does not follow commands, pupils 2mm b/l, gaze midline, +corneals, + cough, localizes with L arm AG, no movement in R arm, withdraws L leg in plane of bed, TF R leg   Cards:  RRR  Respiratory:  no respiratory distress  Abdomen:  soft  Extremities:  no LE edema    Assessment/Plan:   73 yo woman with HTN and DM, presented to [facility] 12/18 with HA and R hemiplegia, NIHSS 24, 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 S/p EVD. CTA negative for vascular malformation. With subsequent expansion of ICH on next CTH.     c/w EVD at 10cm H2O  MRI brain w/wo when able  repeat CTH AM  NPO for potential angiogram tomorrow   SBP goal 100-160  Na 145-155, on HTS at 30cc/hr  give 1L bolus for hypotension     R axillary Aliyah     Patient seen and examined by attending on 12/19/2022.    Patient is critically ill due to ICH with IVH and at high risk for neurological deterioration or death due to: potential expansion of ICH, midline shift of brain, inability to protect airway due to neurologic injury requiring mechanical ventilation.

## 2022-12-19 NOTE — PROGRESS NOTE ADULT - SUBJECTIVE AND OBJECTIVE BOX
NSCU Progress Note    Assessment/Hospital Course:    72F hx of HTN, DM who initially presented to [facility] 12/18 for worsening HA and Rt hemplegia LKW 230pm, [id number] NIHSS 24, CTH with L thalamic heme w/ IVH and HCP w/ extension into midbrain and terporoparietal lobe, intubated in ED for airway protecton and EVD placed, admitted to OSH MICU then on interval CTH with expansion of heme and ?spot sign on CTA, txer to [facility] for possible angio.      24 Hour Events/Subjective:  - EVD@10, no ICP issues  - on cardene to maintain bp parameters      REVIEW OF SYSTEMS:  - negative except as above    VITALS:   - T(C): 37.1 (12-19-22 @ 14:20), Max: 38.2 (12-19-22 @ 04:00)  T(F): 98.8 (12-19-22 @ 14:20), Max: 100.8 (12-19-22 @ 04:00)  HR: 95 (12-19-22 @ 15:30) (64 - 114)  BP: 139/69 (12-19-22 @ 15:30) (83/53 - 195/92)  ABP: 130/63 (12-19-22 @ 15:30) (130/63 - 130/63)  ABP(mean): 89 (12-19-22 @ 15:30) (89 - 89)  RR: 27 (12-19-22 @ 15:30) (11 - 27)  SpO2: 100% (12-19-22 @ 15:30) (99% - 100%)      IMAGING/DATA:   - Reviewed          PHYSICAL EXAM:    General: ett to vent  CVS: RR  Pulm: CTAB. mechanical bs  GI: Soft, NTND  Extremities: No LE Edema  Neuro: L pupil 1mm ?NR, Rt puil 2mm sluggish, +cough/gag, +dolls, LUE localizes, LLE spont/WD, RUE 0/5, RLE TF

## 2022-12-19 NOTE — CHART NOTE - NSCHARTNOTEFT_GEN_A_CORE
CAPRINI SCORE [CLOT] Score on Admission for     AGE RELATED RISK FACTORS                                                       MOBILITY RELATED FACTORS  [ ] Age 41-60 years                                            (1 Point)                  [x ] Bed rest                                                        (1 Point)  [x ] Age: 61-74 years                                           (2 Points)                 [ ] Plaster cast                                                   (2 Points)  [ ] Age= 75 years                                              (3 Points)                 [ ] Bed bound for more than 72 hours                 (2 Points)    DISEASE RELATED RISK FACTORS                                               GENDER SPECIFIC FACTORS  [ ] Edema in the lower extremities                       (1 Point)                  [ ] Pregnancy                                                     (1 Point)  [ ] Varicose veins                                               (1 Point)                  [ ] Post-partum < 6 weeks                                   (1 Point)             [x ] BMI > 25 Kg/m2                                            (1 Point)                  [ ] Hormonal therapy  or oral contraception          (1 Point)                 [ ] Sepsis (in the previous month)                        (1 Point)                  [ ] History of pregnancy complications                 (1 point)  [ ] Pneumonia or serious lung disease                                               [ ] Unexplained or recurrent                     (1 Point)           (in the previous month)                               (1 Point)  [x ] Abnormal pulmonary function test                     (1 Point)                 SURGERY RELATED RISK FACTORS (include planned surgeries)  [ ] Acute myocardial infarction                              (1 Point)                 [ ]  Section                                             (1 Point)  [ ] Congestive heart failure (in the previous month)  (1 Point)         [ ] Minor surgery                                                  (1 Point)   [ ] Inflammatory bowel disease                             (1 Point)                 [ ] Arthroscopic surgery                                        (2 Points)  [ ] Central venous access                                      (2 Points)                [ ] General surgery lasting more than 45 minutes   (2 Points)       [x ] Stroke (in the previous month)                          (5 Points)               [ ] Elective arthroplasty                                         (5 Points)            [ ] current or past malignancy                              (2 Points)                                                                                                       HEMATOLOGY RELATED FACTORS                                                 TRAUMA RELATED RISK FACTORS  [ ] Prior episodes of VTE                                     (3 Points)                [ ] Fracture of the hip, pelvis, or leg                       (5 Points)  [ ] Positive family history for VTE                         (3 Points)                 [ ] Acute spinal cord injury (in the previous month)  (5 Points)  [ ] Prothrombin 47668 A                                     (3 Points)                 [ ] Paralysis  (less than 1 month)                             (5 Points)  [ ] Factor V Leiden                                             (3 Points)                  [ ] Multiple Trauma within 1 month                        (5 Points)  [ ] Lupus anticoagulants                                     (3 Points)                                                           [ ] Anticardiolipin antibodies                               (3 Points)                                                       [ ] High homocysteine in the blood                      (3 Points)                                             [ ] Other congenital or acquired thrombophilia      (3 Points)                                                [ ] Heparin induced thrombocytopenia                  (3 Points)                                          Total Score [  10        ]    Risk:  Very low 0   Low 1 to 2   Moderate 3 to 4   High =5       VTE Prophylasix Recommednations:  [x ] mechanical pneumatic compression devices                                      [ ] contraindicated: _____________________  [ ] chemo prophylasix                                                                                   [x ] contraindicated _____________________    **** HIGH LIKELIHOOD DVT PRESENT ON ADMISSION  [ ] (please order LE dopplers within 24 hours of admission)

## 2022-12-19 NOTE — DISCHARGE NOTE PROVIDER - HOSPITAL COURSE
71 YO woman with PMH of HTN, DM who presented to the ED with 1 day history of AMS. During transport with EMS, her SBP was in the 190-200 range. In the ED she was noted to have NIHSS score of 24. Vitals were noted to be temp 97.1, HR 83, /90, RR 18, saturating 100% on room air. Code stroke was called and CT showed acute intraparenchymal hemorrhage in the L frontoparietal lobe and thalamus with mass effect on the left lateral ventricle/left ambient cistern. Neurosurgery was called, she was intubated due to need for airway placement. Neurosurg placed a bedside external ventricular drain and was loaded with 1 gram keppra. Cefazolin was given as pre-procedure prophylaxis; per neurosurgery will continue while drain is in place.    In MICU, pt's BPs were soft to 95/51 and phenylephrine was started for goal -120. Additionally hypertonic saline was given for goal Na 145-150, pt currently at 139. Lactate elevated, likely in setting of ischemia.    Repeat CTH at 9:30 PM 12/18 with new R sided subarachnoid hemorrhage, similar-appearing evolving acute parenchymal hematoma centered within the left thalamocapsular junction with intraventricular extension. Repeat CTH 12/19 with no change. Drain adjusted by neurosurg before transfer.     Pt is hemodynamically stable for transfer to [facility] for angiogram. 73 YO woman with PMH of HTN, DM who presented to the ED with 1 day history of AMS. During transport with EMS, her SBP was in the 190-200 range. In the ED she was noted to have NIHSS score of 24. Vitals were noted to be temp 97.1, HR 83, /90, RR 18, saturating 100% on room air. Code stroke was called and CT showed acute intraparenchymal hemorrhage in the L frontoparietal lobe and thalamus with mass effect on the left lateral ventricle/left ambient cistern. Neurosurgery was called, she was intubated due to need for airway placement. Neurosurg placed a bedside external ventricular drain and was loaded with 1 gram keppra. Cefazolin was given as pre-procedure prophylaxis; per neurosurgery will continue while drain is in place.    In MICU, pt's BPs were soft to 95/51 and phenylephrine was started for goal -120. Additionally hypertonic saline was given for goal Na 145-150, pt currently at 139. Lactate elevated, likely in setting of ischemia.    Repeat CTH at 9:30 PM 12/18 with new R sided subarachnoid hemorrhage, similar-appearing evolving acute parenchymal hematoma centered within the left thalamocapsular junction with intraventricular extension. Repeat CTH 12/19 with no change. Drain adjusted by neurosurg before transfer.     Pt is hemodynamically stable for transfer to [facility] for angiogram.     Addendum: This patient required endotracheal intubation and mechanical ventilation for respiratory failure, the cause of which was neurological injury from a intracerebral bleed.

## 2022-12-19 NOTE — DISCHARGE NOTE PROVIDER - NSDCCPTREATMENT_GEN_ALL_CORE_FT
PRINCIPAL PROCEDURE  Procedure: CT head wo con  Findings and Treatment:   < end of copied text >  ACC: [id number] EXAM:  CT BRAIN                        PROCEDURE DATE:  12/19/2022    INTERPRETATION:  Noncontrast CT of the brain.  CLINICAL INDICATION:  Follow-up large left basal ganglia and   intraventricular hemorrhage  TECHNIQUE : Axial CT scanning of the brain was obtained from the skull   base to the vertex without the administration of intravenous contrast.   Sagittal and coronal reformats were provided.  COMPARISON: CT brain 12/18/2022  FINDINGS:  Redemonstration of right frontal approach ventriculostomy catheter in   unchanged position. Small pericatheter hemorrhage is similar.  Similar appearing large acute left basal ganglia parenchymal hemorrhage   with similar moderate intraventricular extension.  Similar scattered sulcal subarachnoid hemorrhage.  Similar rightward midline shift of 7 mm.  Ventricles are similar in size, no large hydrocephalus.  Basal cisterns are visualized.  The visualized paranasal sinuses and mastoid air cells are clear.  IMPRESSION:  No significant interval change from 12/18/2022.  --- End of Report ---< from: CT Head No Cont (12.19.22 @ 10:19) >        SECONDARY PROCEDURE  Procedure: CT head wo con  Findings and Treatment:   < end of copied text >  ACC: [id number] EXAM:  CT BRAIN                        PROCEDURE DATE:  12/18/2022    INTERPRETATION:  .  CLINICAL INFORMATION: Monitor post bleed.  TECHNIQUE: Multiple axial CT images of the head were obtained without   contrast. Sagittal and coronal reconstructed images were acquired from   the source data.  COMPARISON: Most recent prior head CT study from 12/18/2022 at 3:59 PM.  FINDINGS: There is been interval placement of a right-sided   ventriculostomy catheter with the tip in the third ventricle. A small   amount of pericatheter hemorrhage is seen. Ventricular size and   configuration is similar when compared to the prior study.  There is redemonstration of a large acute left-sided thalamocapsular   parenchymal hemorrhage with intraventricular extension. Overall size of   the hemorrhage is similar in size when compared to the prior study. Shift   of the midline structures from left to right appears similar. No overt   herniation is notable.  Trace amount of right-sided subarachnoid hemorrhage is new along the   right frontotemporal sulci.  Scattered mucosal thickening is seen throughout the paranasal sinuses.   Layering secretions are seen within the nasopharynx. The mastoid air   cells are clear. The orbits appear unremarkable.  IMPRESSION: Interval placement of a right-sided ventriculostomy catheter   with interval decrease in size of the ventricular system.  Similar-appearing evolving acute parenchymal hematoma centered within the   left thalamocapsular junction with intraventricular extension.  New trace amounts of right-sided subarachnoid hemorrhage.  Recommend continued short-term CT follow-up.  --- End of Report ---  MICHAEL RAMIREZ MD; Attending Radiologist  This document has been electronically signed. Dec 19 2022  8:22AM< from: CT Head No Cont (12.18.22 @ 21:45) >      Procedure: CT head w con  Findings and Treatment:   < end of copied text >  ACC: [id number] EXAM:  CT BRAIN STROKE PROTOCOL                        PROCEDURE DATE:  12/18/2022    INTERPRETATION:  CLINICAL INDICATION: Stroke code. Right-sided weakness,   slurred speech, lethargy.  TECHNIQUE: Noncontrast CT of the head was performed.  Multiple contiguous axial images were acquired from the skull base to the   vertex without the administration of intravenous contrast. Coronal and   sagittal reformations were made.  COMPARISON: None.  FINDINGS:  There is an acute intraparenchymal hemorrhage within the left   frontoparietal region with extension into the left thalamus. There is   mass effect on the left lateral ventricle and the left side of the   ambient cistern. The remaining basal cisterns are patent. There is no   midline shift.  Mild prominence of the sulci and ventricles are consistent with   age-appropriate volume loss. There are hemispheric white matter areas of   low attenuation which are nonspecific but likely related to sequelae of   microvascular disease. The basal cisterns are patent.  No abnormal   extra-axial fluid collections are present.  Mucosal thickening in the sinuses. The mastoid air cells and middle ear   cavities are clear. The intraorbital compartments are unremarkable.  The soft tissues of the scalp are unremarkable. The calvarium is intact.  IMPRESSION:  Acute intraparenchymal hemorrhage within the left frontoparietal lobe and   thalamus. There is mass effect on the left lateral ventricle and left   ambient cistern.  Dr. Schreiber discussed these findings with Dr. Bullard on 12/18/2022   3:51 PM with read back.  --- End of Report ---  JI TUCKER MD; Resident Radiology  This document has been electronically signed.  MICHAEL SMITH MD; Attending Radiologist  This document has been electronically signed. Dec 18 2022  4:02PM< from: CT Brain Stroke Protocol (12.18.22 @ 16:06) >

## 2022-12-19 NOTE — DISCHARGE NOTE PROVIDER - CARE PROVIDER_API CALL
Justin Lau)  Neurology  [address] [place] [address], [address] [place] [address]  Phone: [phone]  Fax: [phone]  Follow Up Time:

## 2022-12-19 NOTE — DISCHARGE NOTE NURSING/CASE MANAGEMENT/SOCIAL WORK - PATIENT PORTAL LINK FT
You can access the FollowMyHealth Patient Portal offered by [facility] by registering at the following website: [email or web address]. By joining [facility]’s FollowMyHealth portal, you will also be able to view your health information using other applications (apps) compatible with our system.

## 2022-12-19 NOTE — PATIENT PROFILE ADULT - NSPROPTRIGHTCAREGIVER_GEN_A_NUR
information could not be obtained discontinue ensure pudding, not interested in alternative PO supplements at this time.

## 2022-12-19 NOTE — PROGRESS NOTE ADULT - ASSESSMENT
12/19: 72year old female with PMH of DM, HTN presenting with AMS, found to have L IPH. Pt intubated in ER and R EVD placed at bedside, rpt CTH w/ worsening IPH/ IVH. ICP 6-9, EVD draining bloody csf, on cardeine gtt.      P:  - close neuro checks in micu  - neurology f/u  - c/w EVD drain   - ICP q1h  - BP control  - keppra for seizure ppx  - rpt cth in 12h for stability   d/w attending  12/19: 72year old female with PMH of DM, HTN presenting with AMS, found to have L IPH. Pt intubated in ER and R EVD placed at bedside, rpt CTH w/ worsening IPH/ IVH. ICP 6-9, EVD draining bloody csf, on cardeine gtt.      P:  - close neuro checks in micu  - neurology f/u  - c/w EVD drain   - ICP q1h  - BP control  - keppra for seizure ppx  - rpt cth in 12h for stability   d/w attending       AM UPDATE:  - will transfer patient to [facility] for angiogram, has bed in NSICU  - STAT CTH THIS AM PRIOR TO TRANSFER  Case discussed with attending neurosurgeon Dr. Webb

## 2022-12-19 NOTE — H&P ADULT - HISTORY OF PRESENT ILLNESS
Patient is a 72 year old female with a history of hypertension and diabetes that presented to [facility] ED with altered mental status.  Noted to be hypertensive to SBP 200s en route to hospital.  CT head showed left thalamic intraparenchymal hemorrhage.  Intubated for airway protection.  EVD placed.  Now transferred to [facility] for neurosurgical management.   Patient is a 72 year old female with a history of hypertension and diabetes that presented to [facility] ED with altered mental status.  Noted to be hypertensive to SBP 200s en route to hospital.  CT head showed left thalamic intraparenchymal hemorrhage.  Intubated for airway protection.  EVD placed.  Now transferred to [facility] for neurosurgical management.      NIHSS 20  ICH score 4

## 2022-12-19 NOTE — PROGRESS NOTE ADULT - SUBJECTIVE AND OBJECTIVE BOX
INTERVAL HPI/OVERNIGHT EVENTS:    SUBJECTIVE: Patient seen and examined at bedside.       OBJECTIVE:    VITAL SIGNS:  ICU Vital Signs Last 24 Hrs  T(C): 36.9 (19 Dec 2022 08:00), Max: 38.2 (19 Dec 2022 04:00)  T(F): 98.4 (19 Dec 2022 08:00), Max: 100.8 (19 Dec 2022 04:00)  HR: 75 (19 Dec 2022 11:00) (64 - 94)  BP: 132/66 (19 Dec 2022 11:00) (83/53 - 205/90)  BP(mean): 86 (19 Dec 2022 11:00) (63 - 108)  ABP: --  ABP(mean): --  RR: 14 (19 Dec 2022 11:00) (12 - 20)  SpO2: 100% (19 Dec 2022 11:00) (99% - 100%)    O2 Parameters below as of 19 Dec 2022 11:00  Patient On (Oxygen Delivery Method): ventilator    O2 Concentration (%): 40      Mode: AC/ CMV (Assist Control/ Continuous Mandatory Ventilation), RR (machine): 12, TV (machine): 350, FiO2: 40, PEEP: 5, ITime: 0.74, MAP: 8, PIP: 20    12-18 @ 07:01  -  12-19 @ 07:00  --------------------------------------------------------  IN: 1608.5 mL / OUT: 1957 mL / NET: -348.5 mL    12-19 @ 07:01  -  12-19 @ 12:21  --------------------------------------------------------  IN: 88 mL / OUT: 461 mL / NET: -373 mL      CAPILLARY BLOOD GLUCOSE  155 (18 Dec 2022 15:45)      POCT Blood Glucose.: 120 mg/dL (19 Dec 2022 11:17)      PHYSICAL EXAM:  General: sedated  HEENT: NCAT, PERRL, clear conjunctiva, sclera anicteric  Neck: supple, no JVD  Respiratory: CTAB  Cardiovascular: RRR, S1S2, no m/r/g  Abdomen: soft, nontender, nondistended, normal bowel sounds  Extremities: no edema, no cyanosis  Skin: warm, perfused  Neurological: nonfocal    MEDICATIONS:  MEDICATIONS  (STANDING):  ceFAZolin   IVPB 2000 milliGRAM(s) IV Intermittent every 8 hours  ceFAZolin   IVPB      chlorhexidine 0.12% Liquid 15 milliLiter(s) Oral Mucosa every 12 hours  chlorhexidine 4% Liquid 1 Application(s) Topical <User Schedule>  dextrose 5%. 1000 milliLiter(s) (100 mL/Hr) IV Continuous <Continuous>  dextrose 50% Injectable 12.5 Gram(s) IV Push once  dextrose 50% Injectable 25 Gram(s) IV Push once  fentaNYL   Infusion. 0.5 MICROgram(s)/kG/Hr (2.66 mL/Hr) IV Continuous <Continuous>  glucagon  Injectable 1 milliGRAM(s) IntraMuscular once  insulin lispro (ADMELOG) corrective regimen sliding scale   SubCutaneous every 6 hours  levETIRAcetam  Solution 500 milliGRAM(s) Oral two times a day  niCARdipine Infusion 5 mG/Hr (25 mL/Hr) IV Continuous <Continuous>  norepinephrine Infusion 0.05 MICROgram(s)/kG/Min (4.99 mL/Hr) IV Continuous <Continuous>  phenylephrine    Infusion 0.1 MICROgram(s)/kG/Min (2 mL/Hr) IV Continuous <Continuous>  propofol Infusion 20 MICROgram(s)/kG/Min (7.2 mL/Hr) IV Continuous <Continuous>  sodium chloride 3%. 500 milliLiter(s) (30 mL/Hr) IV Continuous <Continuous>    MEDICATIONS  (PRN):      ALLERGIES:  Allergies    No Known Allergies    Intolerances        LABS:                        10.1   15.38 )-----------( 373      ( 18 Dec 2022 20:20 )             34.1     12-19    139  |  101  |  11  ----------------------------<  114<H>  3.9   |  26  |  0.59    Ca    8.6      19 Dec 2022 08:55  Phos  4.5     12-19  Mg     1.70     12-19    TPro  6.2  /  Alb  3.5  /  TBili  0.5  /  DBili  x   /  AST  15  /  ALT  8   /  AlkPhos  62  12-19    PT/INR - ( 18 Dec 2022 20:20 )   PT: 11.5 sec;   INR: 0.99 ratio         PTT - ( 18 Dec 2022 20:20 )  PTT:28.9 sec      RADIOLOGY & ADDITIONAL TESTS: Reviewed. INTERVAL HPI/OVERNIGHT EVENTS: Pt febrile overnight to 38.5, blood cx sent. Plan to transfer pt to [facility] following CT scan.    SUBJECTIVE: Patient seen and examined at bedside. Pt not responsive to voice or pain.       OBJECTIVE:    VITAL SIGNS:  ICU Vital Signs Last 24 Hrs  T(C): 36.9 (19 Dec 2022 08:00), Max: 38.2 (19 Dec 2022 04:00)  T(F): 98.4 (19 Dec 2022 08:00), Max: 100.8 (19 Dec 2022 04:00)  HR: 75 (19 Dec 2022 11:00) (64 - 94)  BP: 132/66 (19 Dec 2022 11:00) (83/53 - 205/90)  BP(mean): 86 (19 Dec 2022 11:00) (63 - 108)  ABP: --  ABP(mean): --  RR: 14 (19 Dec 2022 11:00) (12 - 20)  SpO2: 100% (19 Dec 2022 11:00) (99% - 100%)    O2 Parameters below as of 19 Dec 2022 11:00  Patient On (Oxygen Delivery Method): ventilator    O2 Concentration (%): 40      Mode: AC/ CMV (Assist Control/ Continuous Mandatory Ventilation), RR (machine): 12, TV (machine): 350, FiO2: 40, PEEP: 5, ITime: 0.74, MAP: 8, PIP: 20    12-18 @ 07:01  -  12-19 @ 07:00  --------------------------------------------------------  IN: 1608.5 mL / OUT: 1957 mL / NET: -348.5 mL    12-19 @ 07:01  -  12-19 @ 12:21  --------------------------------------------------------  IN: 88 mL / OUT: 461 mL / NET: -373 mL      CAPILLARY BLOOD GLUCOSE  155 (18 Dec 2022 15:45)      POCT Blood Glucose.: 120 mg/dL (19 Dec 2022 11:17)      PHYSICAL EXAM:  General: sedated  HEENT: NCAT, PERRL, clear conjunctiva, sclera anicteric  Neck: supple, no JVD  Respiratory: CTAB  Cardiovascular: RRR, S1S2, no m/r/g  Abdomen: soft, nontender, nondistended, normal bowel sounds  Extremities: no edema, no cyanosis  Skin: warm, perfused  Neurological: nonfocal    MEDICATIONS:  MEDICATIONS  (STANDING):  ceFAZolin   IVPB 2000 milliGRAM(s) IV Intermittent every 8 hours  ceFAZolin   IVPB      chlorhexidine 0.12% Liquid 15 milliLiter(s) Oral Mucosa every 12 hours  chlorhexidine 4% Liquid 1 Application(s) Topical <User Schedule>  dextrose 5%. 1000 milliLiter(s) (100 mL/Hr) IV Continuous <Continuous>  dextrose 50% Injectable 12.5 Gram(s) IV Push once  dextrose 50% Injectable 25 Gram(s) IV Push once  fentaNYL   Infusion. 0.5 MICROgram(s)/kG/Hr (2.66 mL/Hr) IV Continuous <Continuous>  glucagon  Injectable 1 milliGRAM(s) IntraMuscular once  insulin lispro (ADMELOG) corrective regimen sliding scale   SubCutaneous every 6 hours  levETIRAcetam  Solution 500 milliGRAM(s) Oral two times a day  niCARdipine Infusion 5 mG/Hr (25 mL/Hr) IV Continuous <Continuous>  norepinephrine Infusion 0.05 MICROgram(s)/kG/Min (4.99 mL/Hr) IV Continuous <Continuous>  phenylephrine    Infusion 0.1 MICROgram(s)/kG/Min (2 mL/Hr) IV Continuous <Continuous>  propofol Infusion 20 MICROgram(s)/kG/Min (7.2 mL/Hr) IV Continuous <Continuous>  sodium chloride 3%. 500 milliLiter(s) (30 mL/Hr) IV Continuous <Continuous>    MEDICATIONS  (PRN):      ALLERGIES:  Allergies    No Known Allergies    Intolerances        LABS:    CBC Full  -  ( 18 Dec 2022 20:20 )  WBC Count : 15.38 K/uL  RBC Count : 5.39 M/uL  Hemoglobin : 10.1 g/dL  Hematocrit : 34.1 %  Platelet Count - Automated : 373 K/uL  Mean Cell Volume : 63.3 fL  Mean Cell Hemoglobin : 18.7 pg  Mean Cell Hemoglobin Concentration : 29.6 gm/dL  Auto Neutrophil # : 13.74 K/uL  Auto Lymphocyte # : 1.16 K/uL  Auto Monocyte # : 0.34 K/uL  Auto Eosinophil # : 0.02 K/uL  Auto Basophil # : 0.05 K/uL  Auto Neutrophil % : 89.4 %  Auto Lymphocyte % : 7.5 %  Auto Monocyte % : 2.2 %  Auto Eosinophil % : 0.1 %  Auto Basophil % : 0.3 %    12-19    139  |  101  |  11  ----------------------------<  114<H>  3.9   |  26  |  0.59    Ca    8.6      19 Dec 2022 08:55  Phos  4.5     12-19  Mg     1.70     12-19    TPro  6.2  /  Alb  3.5  /  TBili  0.5  /  DBili  x   /  AST  15  /  ALT  8   /  AlkPhos  62  12-19        RADIOLOGY & ADDITIONAL TESTS: Reviewed.

## 2022-12-19 NOTE — PATIENT PROFILE ADULT - FALL HARM RISK - HARM RISK INTERVENTIONS

## 2022-12-19 NOTE — DISCHARGE NOTE PROVIDER - NSDCMRMEDTOKEN_GEN_ALL_CORE_FT
Calcium 600+D oral tablet: 1 tab(s) orally once a day  folic acid 1 mg oral tablet: 1 tab(s) orally once a day  gabapentin 100 mg oral tablet: 1 tab(s) orally once a day  glipiZIDE 10 mg oral tablet: 1 tab(s) orally once a day  Janumet 50 mg-1000 mg oral tablet: 1 tab(s) orally once a day  levothyroxine 25 mcg (0.025 mg) oral tablet: 1 tab(s) orally once a day  loratadine 10 mg oral tablet: 1 tab(s) orally once a day  losartan 50 mg oral tablet: 1 tab(s) orally once a day  meclizine 25 mg oral tablet: 1 tab(s) orally once a day  pravastatin 20 mg oral tablet: 1 tab(s) orally once a day  traZODone 50 mg oral tablet: 1 tab(s) orally once a day   ceFAZolin: 2000 milligram(s) intravenously every 8 hours  chlorhexidine 0.12% mucous membrane liquid: 15 milliliter(s) mucous membrane every 12 hours  Dextrose 5% in Water intravenous solution: 1000 milliliter(s) intravenous once a day  fentaNYL: 2500 microgram(s) by continuous intravenous infusion once a day  glucose 50% intravenous solution: 25 milliliter(s) intravenous once  glucose 50% intravenous solution: 50 milliliter(s) intravenous once  insulin lispro 100 units/mL injectable solution: injectable 3 times a day  levETIRAcetam 100 mg/mL oral solution: 5 milliliter(s) orally 2 times a day  levothyroxine: 12.5 microgram(s) intravenous once a day (at bedtime)  phenylephrine: 40 milligram(s) by continuous intravenous infusion once a day  propofol: 1000 milligram(s) by continuous intravenous infusion once a day  Sodium Chloride 3% intravenous solution: 500 milliliter(s) intravenous once a day

## 2022-12-19 NOTE — CHART NOTE - NSCHARTNOTEFT_GEN_A_CORE
: Rai Bullock    INDICATION: respiratory failure    PROCEDURE:  [x ] LIMITED ECHO  [x ] LIMITED CHEST  [ ] LIMITED RETROPERITONEAL  [ ] LIMITED ABDOMINAL  [ ] LIMITED DVT  [ ] NEEDLE GUIDANCE VASCULAR  [ ] NEEDLE GUIDANCE THORACENTESIS  [ ] NEEDLE GUIDANCE PARACENTESIS  [ ] NEEDLE GUIDANCE PERICARDIOCENTESIS  [ ] OTHER    FINDINGS:  A-line predominance.   Grossly normal systolic function; no RV enlargement.       INTERPRETATION:  Normal aeration pattern.   No cardiac limitation. : Rai Bullock    INDICATION: respiratory failure    PROCEDURE:  [x ] LIMITED ECHO  [x ] LIMITED CHEST  [ ] LIMITED RETROPERITONEAL  [ ] LIMITED ABDOMINAL  [ ] LIMITED DVT  [ ] NEEDLE GUIDANCE VASCULAR  [ ] NEEDLE GUIDANCE THORACENTESIS  [ ] NEEDLE GUIDANCE PARACENTESIS  [ ] NEEDLE GUIDANCE PERICARDIOCENTESIS  [ ] OTHER    FINDINGS:  A-line predominance.   Grossly normal systolic function; no RV enlargement.       INTERPRETATION:  Normal aeration pattern.   No cardiac limitation.    I was present during the key portions of the procedure and immediately available during the entire procedure.  Kobe [place]  Attending

## 2022-12-19 NOTE — PATIENT PROFILE ADULT - FUNCTIONAL ASSESSMENT - BASIC MOBILITY 5.
Procedure To Be Performed At Next Visit: Biopsy by shave method Detail Level: Zone 1 = Total assistance

## 2022-12-20 NOTE — SPEECH LANGUAGE PATHOLOGY EVALUATION - SLP DIAGNOSIS
Consult received and appreciated. Chart reviewed. As per discussion in NSCU rounds Pt remains intubated on full vent support, not medically appropriate to be seen for evaluation at this time. This service will continue to follow. Will reattempt as clinically indicated.

## 2022-12-20 NOTE — PROGRESS NOTE ADULT - ASSESSMENT
ASSESSMENT/PLAN:    72F hx of HTN, DM who initially presented to [facility] 12/18 for worsening HA and Rt hemplegia LKW 230pm, [id number] NIHSS 24, CTH with L thalamic heme w/ IVH and HCP w/ extension into midbrain and terporoparietal lobe, intubated in ED for airway protecton and EVD placed, admitted to OSH MICU then on interval CTH with expansion of heme and ?spot sign on CTA, txer to [facility] for possible angio.    NEURO:  - neuro checks q1h  - no need for immediate angio per nsg  - EVD@10, ICP goal 22, monitor output  - MRI brain WWO when able  - repeat CTH today  - pain control  - sedation precedex for vent synchrony    CVS:  - SBP goal   - TTE  - tele  - EKG, A1c  - cardene prn to goal  - Rt axillary [place] placed 12/18    PULM:  Intubated at OSH ED for airway protection  - ETT to vent  - CXR daily  - mucomyst, nebs  - ABG    RENAL:  - Fluids: HTS 3%@30  - daily IOs  - IOs daily  - Na 145-155  - BMPq6h    GI:  - Diet: NPO for MRI then AD TF  - GI prophylaxis: PPI while intubated  - Bowel regimen: miralax, senna    ENDO:   - FS goal 120-180    HEME/ONC:  - SCDs  - Chemoppx: hold d/t fresh heme  - FOBT  - repeat CBC (recent axillary magaly)    ID:  - monitor for fevers     ASSESSMENT/PLAN:    72F hx of HTN, DM who initially presented to [facility] 12/18 for worsening HA and Rt hemplegia LKW 230pm, [id number] NIHSS 24, CTH with L thalamic heme w/ IVH and HCP w/ extension into midbrain and terporoparietal lobe, intubated in ED for airway protecton and EVD placed, admitted to OSH MICU then on interval CTH with expansion of heme and ?spot sign on CTA, txer to [facility] for possible angio.    NEURO:  - neuro checks q1h  - no need for immediate angio per nsg  - EVD@10, ICP goal 22, monitor output  - MRI brain WWO when able  - repeat CTH today  - pain control  - Keppra for SZ ppx until MRI  - sedation precedex 0.4 for vent synchrony; hold for now restart prn    CVS:  - SBP goal   - TTE  - tele  - cardene 0.5  - restart home losartan  - Rt axillary [place] placed 12/18    PULM:  Intubated at OSH ED for airway protection  - ETT to vent  - CXR daily  - mucomyst, nebs  - ABG  - wean to extubate    RENAL:  - Fluids: HTS 3%@30  - daily IOs  - IOs daily  - Na 145-155  - BMPq6h    GI:  - Diet: NPO for MRI then AD TF  - GI prophylaxis: PPI while intubated  - Bowel regimen: miralax, senna    ENDO:   - FS goal 120-180    HEME/ONC:  - SCDs  - Chemoppx: hold d/t fresh heme  - FOBT  - repeat CBC (recent axillary magaly)    ID:  - pancultured 12/20  - f/u cultures

## 2022-12-20 NOTE — PROVIDER CONTACT NOTE (OTHER) - ASSESSMENT
Pupils 2RF, localizes on left upper extremity withdraws on left lower extremity triple flexion on right lower extremity and no movement on right upper extremity.

## 2022-12-20 NOTE — PROGRESS NOTE ADULT - SUBJECTIVE AND OBJECTIVE BOX
Interval events:  Patient seen on evening rounds, no acute events.  On low dose lilliana. On Precedex 0.4.   Febrile to 38.3, cultures sent.     VITALS:  T(C): , Max: 38.4 (12-20-22 @ 12:00)  HR:  (62 - 123)  BP: --  ABP:  (90/42 - 178/79)  RR:  (11 - 21)  SpO2:  (100% - 100%)  Wt(kg): --  Device: Avea, Mode: AC/ CMV (Assist Control/ Continuous Mandatory Ventilation), RR (machine): 14, TV (machine): 350, FiO2: 40, PEEP: 5, ITime: 1, MAP: 8, PIP: 16    12-19-22 @ 07:01  -  12-20-22 @ 07:00  --------------------------------------------------------  IN: 2020.3 mL / OUT: 748 mL / NET: 1272.3 mL    12-20-22 @ 07:01  -  12-20-22 @ 18:58  --------------------------------------------------------  IN: 603.2 mL / OUT: 1212 mL / NET: -608.8 mL      LABS:  Na: 151 (12-20 @ 18:08), 150 (12-20 @ 13:23), 149 (12-20 @ 05:45), 149 (12-20 @ 00:47), 145 (12-19 @ 16:58), 139 (12-19 @ 08:55), 135 (12-18 @ 20:20), 138 (12-18 @ 15:44)  K: 3.3 (12-20 @ 18:08), 3.7 (12-20 @ 13:23), 3.6 (12-20 @ 05:45), 3.6 (12-20 @ 00:47), 3.7 (12-19 @ 16:58), 3.9 (12-19 @ 08:55), 4.5 (12-18 @ 20:20), 3.9 (12-18 @ 15:44)  Cl: 116 (12-20 @ 18:08), 116 (12-20 @ 13:23), 118 (12-20 @ 05:45), 117 (12-20 @ 00:47), 109 (12-19 @ 16:58), 101 (12-19 @ 08:55), 95 (12-18 @ 20:20), 97 (12-18 @ 15:44)  CO2: 25 (12-20 @ 18:08), 23 (12-20 @ 13:23), 23 (12-20 @ 05:45), 23 (12-20 @ 00:47), 26 (12-19 @ 16:58), 26 (12-19 @ 08:55), 22 (12-18 @ 20:20), 24 (12-18 @ 15:44)  BUN: 10 (12-20 @ 18:08), 10 (12-20 @ 13:23), 12 (12-20 @ 05:45), 12 (12-20 @ 00:47), 12 (12-19 @ 16:58), 11 (12-19 @ 08:55), 10 (12-18 @ 20:20), 10 (12-18 @ 15:44)  Cr: 0.47 (12-20 @ 18:08), 0.44 (12-20 @ 13:23), 0.44 (12-20 @ 05:45), 0.45 (12-20 @ 00:47), 0.49 (12-19 @ 16:58), 0.59 (12-19 @ 08:55), 0.49 (12-18 @ 20:20), 0.54 (12-18 @ 15:44)  Glu: 186(12-20 @ 18:08), 188(12-20 @ 13:23), 196(12-20 @ 05:45), 172(12-20 @ 00:47), 182(12-19 @ 16:58), 114(12-19 @ 08:55), 194(12-18 @ 20:20), 151(12-18 @ 15:44)    Hgb: 8.8 (12-20 @ 13:23), 7.6 (12-20 @ 05:45), 7.3 (12-20 @ 01:28), 9.0 (12-19 @ 16:58), 10.1 (12-18 @ 20:20), 11.1 (12-18 @ 15:44)  Hct: 29.6 (12-20 @ 13:23), 25.5 (12-20 @ 05:45), 24.6 (12-20 @ 01:28), 29.7 (12-19 @ 16:58), 34.1 (12-18 @ 20:20), 36.3 (12-18 @ 15:44)  WBC: 9.36 (12-20 @ 13:23), 7.28 (12-20 @ 05:45), 7.40 (12-20 @ 01:28), 11.12 (12-19 @ 16:58), 15.38 (12-18 @ 20:20), 11.18 (12-18 @ 15:44)  Plt: 326 (12-20 @ 13:23), 299 (12-20 @ 05:45), 288 (12-20 @ 01:28), 373 (12-19 @ 16:58), 373 (12-18 @ 20:20), 395 (12-18 @ 15:44)    INR: 0.99 12-18-22 @ 20:20, 0.97 12-18-22 @ 15:44  PTT: 28.9 12-18-22 @ 20:20, 26.2 12-18-22 @ 15:44    MEDICATIONS:  chlorhexidine 0.12% Liquid 15 milliLiter(s) Oral Mucosa every 12 hours  chlorhexidine 4% Liquid 1 Application(s) Topical <User Schedule>  dexMEDEtomidine Infusion 0.2 MICROgram(s)/kG/Hr IV Continuous <Continuous>  insulin lispro (ADMELOG) corrective regimen sliding scale   SubCutaneous every 6 hours  levETIRAcetam  Solution 500 milliGRAM(s) Oral two times a day  levothyroxine 25 MICROGram(s) Oral daily  losartan 50 milliGRAM(s) Enteral Tube daily  niCARdipine Infusion 5 mG/Hr IV Continuous <Continuous>  pantoprazole  Injectable 40 milliGRAM(s) IV Push daily  phenylephrine    Infusion 0.3 MICROgram(s)/kG/Min IV Continuous <Continuous>  sodium chloride 3% + sodium acetate 50:50 1000 milliLiter(s) IV Continuous <Continuous>    EXAMINATION:  General:  in NAD  HEENT:  with ET tube   Neuro:  eyes close, no EO to noxious, does not follow commands, pupils 2mm b/l, gaze midline, +corneals, + cough, localizes with L arm AG, no movement in R arm, withdraws L leg in plane of bed, TF R leg   Cards:  RRR  Respiratory:  no respiratory distress  Abdomen:  soft  Extremities:  no LE edema    Assessment/Plan:   73 yo woman with HTN and DM, presented to 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 S/p EVD. CTA negative for vascular malformation. With subsequent expansion of ICH on next CTH.     c/w EVD at 10cm H2O  MRI brain w/wo when able  repeat CTH AM  NPO for potential angiogram tomorrow   SBP goal 100-160  Na 145-155, on HTS at 30cc/hr  give 1L bolus for hypotension     R axillary [place]     Patient seen and examined by attending on 12/20/2022.    Patient is critically ill due to ICH with IVH and at high risk for neurological deterioration or death due to: potential expansion of ICH, midline shift of brain, inability to protect airway due to neurologic injury requiring mechanical ventilation.      Interval events:  Patient seen on evening rounds, no acute events.  I updated the family on MRI brain.   Off pressors. On Precedex 0.2.   Febrile to 38.3, last cultured yesterday.     VITALS:  T(C): , Max: 38.4 (12-20-22 @ 12:00)  HR:  (62 - 123)  BP: --  ABP:  (90/42 - 178/79)  RR:  (11 - 21)  SpO2:  (100% - 100%)  Wt(kg): --  Device: Avea, Mode: AC/ CMV (Assist Control/ Continuous Mandatory Ventilation), RR (machine): 14, TV (machine): 350, FiO2: 40, PEEP: 5, ITime: 1, MAP: 8, PIP: 16    12-19-22 @ 07:01  -  12-20-22 @ 07:00  --------------------------------------------------------  IN: 2020.3 mL / OUT: 748 mL / NET: 1272.3 mL    12-20-22 @ 07:01  -  12-20-22 @ 18:58  --------------------------------------------------------  IN: 603.2 mL / OUT: 1212 mL / NET: -608.8 mL      LABS:  Na: 151 (12-20 @ 18:08), 150 (12-20 @ 13:23), 149 (12-20 @ 05:45), 149 (12-20 @ 00:47), 145 (12-19 @ 16:58), 139 (12-19 @ 08:55), 135 (12-18 @ 20:20), 138 (12-18 @ 15:44)  K: 3.3 (12-20 @ 18:08), 3.7 (12-20 @ 13:23), 3.6 (12-20 @ 05:45), 3.6 (12-20 @ 00:47), 3.7 (12-19 @ 16:58), 3.9 (12-19 @ 08:55), 4.5 (12-18 @ 20:20), 3.9 (12-18 @ 15:44)  Cl: 116 (12-20 @ 18:08), 116 (12-20 @ 13:23), 118 (12-20 @ 05:45), 117 (12-20 @ 00:47), 109 (12-19 @ 16:58), 101 (12-19 @ 08:55), 95 (12-18 @ 20:20), 97 (12-18 @ 15:44)  CO2: 25 (12-20 @ 18:08), 23 (12-20 @ 13:23), 23 (12-20 @ 05:45), 23 (12-20 @ 00:47), 26 (12-19 @ 16:58), 26 (12-19 @ 08:55), 22 (12-18 @ 20:20), 24 (12-18 @ 15:44)  BUN: 10 (12-20 @ 18:08), 10 (12-20 @ 13:23), 12 (12-20 @ 05:45), 12 (12-20 @ 00:47), 12 (12-19 @ 16:58), 11 (12-19 @ 08:55), 10 (12-18 @ 20:20), 10 (12-18 @ 15:44)  Cr: 0.47 (12-20 @ 18:08), 0.44 (12-20 @ 13:23), 0.44 (12-20 @ 05:45), 0.45 (12-20 @ 00:47), 0.49 (12-19 @ 16:58), 0.59 (12-19 @ 08:55), 0.49 (12-18 @ 20:20), 0.54 (12-18 @ 15:44)  Glu: 186(12-20 @ 18:08), 188(12-20 @ 13:23), 196(12-20 @ 05:45), 172(12-20 @ 00:47), 182(12-19 @ 16:58), 114(12-19 @ 08:55), 194(12-18 @ 20:20), 151(12-18 @ 15:44)    Hgb: 8.8 (12-20 @ 13:23), 7.6 (12-20 @ 05:45), 7.3 (12-20 @ 01:28), 9.0 (12-19 @ 16:58), 10.1 (12-18 @ 20:20), 11.1 (12-18 @ 15:44)  Hct: 29.6 (12-20 @ 13:23), 25.5 (12-20 @ 05:45), 24.6 (12-20 @ 01:28), 29.7 (12-19 @ 16:58), 34.1 (12-18 @ 20:20), 36.3 (12-18 @ 15:44)  WBC: 9.36 (12-20 @ 13:23), 7.28 (12-20 @ 05:45), 7.40 (12-20 @ 01:28), 11.12 (12-19 @ 16:58), 15.38 (12-18 @ 20:20), 11.18 (12-18 @ 15:44)  Plt: 326 (12-20 @ 13:23), 299 (12-20 @ 05:45), 288 (12-20 @ 01:28), 373 (12-19 @ 16:58), 373 (12-18 @ 20:20), 395 (12-18 @ 15:44)    INR: 0.99 12-18-22 @ 20:20, 0.97 12-18-22 @ 15:44  PTT: 28.9 12-18-22 @ 20:20, 26.2 12-18-22 @ 15:44    MEDICATIONS:  chlorhexidine 0.12% Liquid 15 milliLiter(s) Oral Mucosa every 12 hours  chlorhexidine 4% Liquid 1 Application(s) Topical <User Schedule>  dexMEDEtomidine Infusion 0.2 MICROgram(s)/kG/Hr IV Continuous <Continuous>  insulin lispro (ADMELOG) corrective regimen sliding scale   SubCutaneous every 6 hours  levETIRAcetam  Solution 500 milliGRAM(s) Oral two times a day  levothyroxine 25 MICROGram(s) Oral daily  losartan 50 milliGRAM(s) Enteral Tube daily  niCARdipine Infusion 5 mG/Hr IV Continuous <Continuous>  pantoprazole  Injectable 40 milliGRAM(s) IV Push daily  phenylephrine    Infusion 0.3 MICROgram(s)/kG/Min IV Continuous <Continuous>  sodium chloride 3% + sodium acetate 50:50 1000 milliLiter(s) IV Continuous <Continuous>    EXAMINATION:  General:  in NAD  HEENT:  with ET tube   Neuro:  eyes close, no EO to noxious, does not follow commands, pupils 2mm b/l, gaze midline, +corneals, + cough, localizes with L arm AG, no movement in R arm, withdraws L leg in plane of bed, TF R leg   Cards:  RRR  Respiratory:  no respiratory distress  Abdomen:  soft  Extremities:  no LE edema    Assessment/Plan:   73 yo woman with HTN and DM, presented to [facility] 12/18 with HA and R hemiplegia, NIHSS 24, 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 S/p EVD. CTA negative for vascular malformation. With subsequent expansion of ICH on next CTH. MRI brain    c/w EVD at 10cm H2O  MRI brain w/wo when able  repeat CTH AM  NPO for potential angiogram tomorrow   SBP goal 100-160  Na 145-155, on HTS at 30cc/hr  give 1L bolus for hypotension     R axillary [place]     Patient seen and examined by attending on 12/20/2022.    Patient is critically ill due to ICH with IVH and at high risk for neurological deterioration or death due to: potential expansion of ICH, midline shift of brain, inability to protect airway due to neurologic injury requiring mechanical ventilation.      Interval events:  Patient seen on evening rounds, no acute events.  I updated the family on MRI brain.   Off pressors. On Precedex 0.2.   Febrile to 38.3, last cultured yesterday.     VITALS:  T(C): , Max: 38.4 (12-20-22 @ 12:00)  HR:  (62 - 123)  BP: --  ABP:  (90/42 - 178/79)  RR:  (11 - 21)  SpO2:  (100% - 100%)  Wt(kg): --  Device: Avea, Mode: AC/ CMV (Assist Control/ Continuous Mandatory Ventilation), RR (machine): 14, TV (machine): 350, FiO2: 40, PEEP: 5, ITime: 1, MAP: 8, PIP: 16    12-19-22 @ 07:01  -  12-20-22 @ 07:00  --------------------------------------------------------  IN: 2020.3 mL / OUT: 748 mL / NET: 1272.3 mL    12-20-22 @ 07:01  -  12-20-22 @ 18:58  --------------------------------------------------------  IN: 603.2 mL / OUT: 1212 mL / NET: -608.8 mL      LABS:  Na: 151 (12-20 @ 18:08), 150 (12-20 @ 13:23), 149 (12-20 @ 05:45), 149 (12-20 @ 00:47), 145 (12-19 @ 16:58), 139 (12-19 @ 08:55), 135 (12-18 @ 20:20), 138 (12-18 @ 15:44)  K: 3.3 (12-20 @ 18:08), 3.7 (12-20 @ 13:23), 3.6 (12-20 @ 05:45), 3.6 (12-20 @ 00:47), 3.7 (12-19 @ 16:58), 3.9 (12-19 @ 08:55), 4.5 (12-18 @ 20:20), 3.9 (12-18 @ 15:44)  Cl: 116 (12-20 @ 18:08), 116 (12-20 @ 13:23), 118 (12-20 @ 05:45), 117 (12-20 @ 00:47), 109 (12-19 @ 16:58), 101 (12-19 @ 08:55), 95 (12-18 @ 20:20), 97 (12-18 @ 15:44)  CO2: 25 (12-20 @ 18:08), 23 (12-20 @ 13:23), 23 (12-20 @ 05:45), 23 (12-20 @ 00:47), 26 (12-19 @ 16:58), 26 (12-19 @ 08:55), 22 (12-18 @ 20:20), 24 (12-18 @ 15:44)  BUN: 10 (12-20 @ 18:08), 10 (12-20 @ 13:23), 12 (12-20 @ 05:45), 12 (12-20 @ 00:47), 12 (12-19 @ 16:58), 11 (12-19 @ 08:55), 10 (12-18 @ 20:20), 10 (12-18 @ 15:44)  Cr: 0.47 (12-20 @ 18:08), 0.44 (12-20 @ 13:23), 0.44 (12-20 @ 05:45), 0.45 (12-20 @ 00:47), 0.49 (12-19 @ 16:58), 0.59 (12-19 @ 08:55), 0.49 (12-18 @ 20:20), 0.54 (12-18 @ 15:44)  Glu: 186(12-20 @ 18:08), 188(12-20 @ 13:23), 196(12-20 @ 05:45), 172(12-20 @ 00:47), 182(12-19 @ 16:58), 114(12-19 @ 08:55), 194(12-18 @ 20:20), 151(12-18 @ 15:44)    Hgb: 8.8 (12-20 @ 13:23), 7.6 (12-20 @ 05:45), 7.3 (12-20 @ 01:28), 9.0 (12-19 @ 16:58), 10.1 (12-18 @ 20:20), 11.1 (12-18 @ 15:44)  Hct: 29.6 (12-20 @ 13:23), 25.5 (12-20 @ 05:45), 24.6 (12-20 @ 01:28), 29.7 (12-19 @ 16:58), 34.1 (12-18 @ 20:20), 36.3 (12-18 @ 15:44)  WBC: 9.36 (12-20 @ 13:23), 7.28 (12-20 @ 05:45), 7.40 (12-20 @ 01:28), 11.12 (12-19 @ 16:58), 15.38 (12-18 @ 20:20), 11.18 (12-18 @ 15:44)  Plt: 326 (12-20 @ 13:23), 299 (12-20 @ 05:45), 288 (12-20 @ 01:28), 373 (12-19 @ 16:58), 373 (12-18 @ 20:20), 395 (12-18 @ 15:44)    INR: 0.99 12-18-22 @ 20:20, 0.97 12-18-22 @ 15:44  PTT: 28.9 12-18-22 @ 20:20, 26.2 12-18-22 @ 15:44    MEDICATIONS:  chlorhexidine 0.12% Liquid 15 milliLiter(s) Oral Mucosa every 12 hours  chlorhexidine 4% Liquid 1 Application(s) Topical <User Schedule>  dexMEDEtomidine Infusion 0.2 MICROgram(s)/kG/Hr IV Continuous <Continuous>  insulin lispro (ADMELOG) corrective regimen sliding scale   SubCutaneous every 6 hours  levETIRAcetam  Solution 500 milliGRAM(s) Oral two times a day  levothyroxine 25 MICROGram(s) Oral daily  losartan 50 milliGRAM(s) Enteral Tube daily  niCARdipine Infusion 5 mG/Hr IV Continuous <Continuous>  pantoprazole  Injectable 40 milliGRAM(s) IV Push daily  phenylephrine    Infusion 0.3 MICROgram(s)/kG/Min IV Continuous <Continuous>  sodium chloride 3% + sodium acetate 50:50 1000 milliLiter(s) IV Continuous <Continuous>    EXAMINATION:  General:  in NAD  HEENT:  with ET tube   Neuro:  eyes close, no EO to noxious, does not follow commands for examiner (but moves foot for the daughter witnessed by nurse), pupils 2mm b/l, gaze midline, +corneals, + cough, moves L arm spontaneously in the plane of bed, no movement in R arm, withdraws L leg in plane of bed, TF R leg   Cards:  RRR  Respiratory:  no respiratory distress  Abdomen:  soft  Extremities:  no LE edema    Assessment/Plan:   71 yo woman with HTN and DM, presented to [facility] 12/18 with HA and R hemiplegia, NIHSS 24, 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 S/p EVD. CTA negative for vascular malformation. With subsequent expansion of ICH on next CTH. MRI brain without any acute findings.     c/w EVD at 10cm H2O  NPO for potential angiogram tomorrow   SBP goal 100-160  Na 145-155, on HTS at 30cc/hr  start TF until MN, NPO after MN, LBM prior to admission   consider starting DVT ppx tomorrow AM     R axillary [place]     Patient seen and examined by attending on 12/20/2022.    Patient is critically ill due to ICH with IVH and at high risk for neurological deterioration or death due to: potential expansion of ICH, midline shift of brain, inability to protect airway due to neurologic injury requiring mechanical ventilation.

## 2022-12-20 NOTE — PROGRESS NOTE ADULT - SUBJECTIVE AND OBJECTIVE BOX
NSCU Progress Note    Assessment/Hospital Course:    72F hx of HTN, DM who initially presented to [facility] 12/18 for worsening HA and Rt hemplegia LKW 230pm, [id number] NIHSS 24, CTH with L thalamic heme w/ IVH and HCP w/ extension into midbrain and terporoparietal lobe, intubated in ED for airway protecton and EVD placed, admitted to OSH MICU then on interval CTH with expansion of heme and ?spot sign on CTA, txer to [facility] for possible angio.      24 Hour Events/Subjective:  - EVD@10, no ICP issues  - pan cultured, febrile overnight  - h/h trending down, will continue to monitor      REVIEW OF SYSTEMS:  - negative except as above    VITALS:   - T(C): 37.1 (12-19-22 @ 14:20), Max: 38.2 (12-19-22 @ 04:00)  T(F): 98.8 (12-19-22 @ 14:20), Max: 100.8 (12-19-22 @ 04:00)  HR: 95 (12-19-22 @ 15:30) (64 - 114)  BP: 139/69 (12-19-22 @ 15:30) (83/53 - 195/92)  ABP: 130/63 (12-19-22 @ 15:30) (130/63 - 130/63)  ABP(mean): 89 (12-19-22 @ 15:30) (89 - 89)  RR: 27 (12-19-22 @ 15:30) (11 - 27)  SpO2: 100% (12-19-22 @ 15:30) (99% - 100%)      IMAGING/DATA:   - Reviewed          PHYSICAL EXAM:    General: ett to vent  CVS: RR  Pulm: CTAB. mechanical bs  GI: Soft, NTND  Extremities: No LE Edema  Neuro:eyes close, no EO to noxious, does not follow commands, pupils 2mm b/l, gaze midline, +corneals, + cough, localizes with L arm AG, no movement in R arm, withdraws L leg in plane of bed, TF R leg  NSCU Progress Note    Assessment/Hospital Course:    72F hx of HTN, DM who initially presented to [facility] 12/18 for worsening HA and Rt hemplegia LKW 230pm, [id number] NIHSS 24, CTH with L thalamic heme w/ IVH and HCP w/ extension into midbrain and terporoparietal lobe, intubated in ED for airway protecton and EVD placed, admitted to OSH MICU then on interval CTH with expansion of heme and ?spot sign on CTA, txer to [facility] for possible angio.      24 Hour Events/Subjective:  - EVD@10, no ICP issues  - pan cultured, febrile overnight  - h/h trending down, will continue to monitor      REVIEW OF SYSTEMS:  - negative except as above    VITALS:   - Reviewed      IMAGING/DATA:   - Reviewed          PHYSICAL EXAM:    General: ett to vent  CVS: RR  Pulm: CTAB. mechanical bs  GI: Soft, NTND  Extremities: No LE Edema  Neuro:eyes close, no EO to noxious, does not follow commands, pupils 2mm b/l, gaze midline, +corneals, + cough, localizes with L arm AG, no movement in R arm, withdraws L leg in plane of bed, TF R leg

## 2022-12-20 NOTE — SPEECH LANGUAGE PATHOLOGY EVALUATION - H & P REVIEW
72 year old female with a history of hypertension and diabetes that presented to [facility] ED with altered mental status.  Noted to be hypertensive to SBP 200s en route to hospital.  CT head showed left thalamic intraparenchymal hemorrhage.  Intubated for airway protection.  EVD placed.  Now transferred to [facility] for neurosurgical management. NIHSS 20. ICH score 4/yes

## 2022-12-21 NOTE — DIETITIAN INITIAL EVALUATION ADULT - NS FNS DIET ORDER
Diet, NPO with Tube Feed:   Tube Feeding Modality: Nasogastric  Glucerna 1.2 Fede (GLUCERNARTH)  Total Volume for 24 Hours (mL): 1440  Continuous  Starting Tube Feed Rate {mL per Hour}: 20  Increase Tube Feed Rate by (mL): 10     Every 4 hours  Until Goal Tube Feed Rate (mL per Hour): 60  Tube Feed Duration (in Hours): 24  Tube Feed Start Time: 21:00 (12-20-22 @ 20:52)

## 2022-12-21 NOTE — DIETITIAN INITIAL EVALUATION ADULT - REASON FOR ADMISSION
Per chart review, patient is a "71 yo woman with HTN and DM, presented to [facility] 12/18 with HA and R hemiplegia, NIHSS 24, 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 S/p EVD. CTA negative for vascular malformation. With subsequent expansion of ICH on next CTH. MRI brain without any acute findings".

## 2022-12-21 NOTE — DIETITIAN INITIAL EVALUATION ADULT - PERTINENT MEDS FT
Nutritional Pertinent Medications (STANDING):  chlorhexidine 0.12% Liquid 15 milliLiter(s) Oral Mucosa every 12 hours  chlorhexidine 4% Liquid 1 Application(s) Topical <User Schedule>  dexMEDEtomidine Infusion 0.2 MICROgram(s)/kG/Hr (2.66 mL/Hr) IV Continuous <Continuous>  enoxaparin Injectable 40 milliGRAM(s) SubCutaneous every 24 hours  insulin lispro (ADMELOG) corrective regimen sliding scale   SubCutaneous every 6 hours  levothyroxine 25 MICROGram(s) Oral daily  losartan 50 milliGRAM(s) Enteral Tube daily  pantoprazole  Injectable 40 milliGRAM(s) IV Push daily  polyethylene glycol 3350 17 Gram(s) Oral every 12 hours  senna Syrup 10 milliLiter(s) Oral at bedtime    MEDICATIONS  (PRN):  fentaNYL    Injectable 25 MICROGram(s) IV Push every 4 hours PRN vent synchrony  oxyCODONE    Solution 5 milliGRAM(s) Oral every 4 hours PRN Severe Pain (7 - 10)   Nutritional Pertinent Medications (STANDING):  insulin lispro (ADMELOG)   losartan   polyethylene glycol   senna Syrup     MEDICATIONS  (PRN):  fentaNYL    Injectable 25 MICROGram(s) IV Push every 4 hours PRN vent synchrony  oxyCODONE    Solution 5 milliGRAM(s) Oral every 4 hours PRN Severe Pain (7 - 10)   Nutritional Pertinent Medications (STANDING):  insulin lispro (ADMELOG)   losartan   polyethylene glycol   senna Syrup

## 2022-12-21 NOTE — DIETITIAN INITIAL EVALUATION ADULT - OTHER INFO
Per chart review, patient mental status is semicomatose. At time of visit, patient intubated on full vent support. Discussed with RN current EN prescription; made aware of current order for Glucerna 1.5 running  Per chart review, patient mental status is semicomatose. At time of visit, patient intubated on full vent support. Discussed with RN current EN prescription; made aware of current order for Glucerna 1.5 and currently running at 20 ml and trickling upward hourly. No report of nausea, vomiting, diarrhea or constipation per chart review. Noted PMHx is DM2 and HTN and patient currently on insulin lispro (admelog) and lasartan.  Per chart review, patient mental status is semicomatose. At time of visit, patient intubated on full vent support. Noted Glucerna 1.5 running at 20 ml and per chart review, trickling up to goal rate hourly. Discussed with RN current EN prescription; made aware of current order for Glucerna 1.5. No report of nausea, vomiting, diarrhea or constipation per chart review. Noted PMHx is DM2 and HTN and patient currently on insulin lispro (admelog) and lasartan in house.  Per chart review, patient mental status is semicomatose. At time of visit, patient intubated on full vent support. Discussed with RN current EN prescription; made aware of current order for Glucerna 1.2. No report of nausea, vomiting, diarrhea or constipation per chart review. Noted PMHx is DM2 and HTN and patient currently on insulin lispro (admelog) and lasartan in house.

## 2022-12-21 NOTE — PROGRESS NOTE ADULT - ASSESSMENT
ASSESSMENT/PLAN:    72F hx of HTN, DM who initially presented to [facility] 12/18 for worsening HA and Rt hemplegia LKW 230pm, [id number] NIHSS 24, CTH with L thalamic heme w/ IVH and HCP w/ extension into midbrain and terporoparietal lobe, intubated in ED for airway protecton and EVD placed, admitted to OSH MICU then on interval CTH with expansion of heme and ?spot sign on CTA, txer to [facility] for possible angio.    NEURO:  - neuro checks q1h  - no need for immediate angio per nsg  - failed EVD clamp trial 12/21 for high ICPs ~20min; raised EVD @20, monitor ICPs  - pain control  - sedation precedex 0.4 for vent synchrony; hold for now restart prn    CVS:  - SBP goal   - TTE  - tele  - restart home losartan  - Rt axillary [place] placed 12/18    PULM:  Intubated at OSH ED for airway protection  - ETT to vent  - CXR daily  - mucomyst, nebs  - ABG  - wean to extubate    RENAL:  - Fluids: IVL DC HTS  - daily IOs  - IOs daily  - Na 145-155  - BMPqdh    GI:  - Diet: TF at goal  - GI prophylaxis: PPI while intubated  - Bowel regimen: miralax, senna    ENDO:   - FS goal 120-180  - synthroid    HEME/ONC:  - SCDs  - Chemoppx: SQL  - FOBT, iron panel  - repeat CBC tonight (recent axillary magaly)    ID:  - pancultured 12/20  - f/u cultures     ASSESSMENT/PLAN:    72F hx of HTN, DM who initially presented to [facility] 12/18 for worsening HA and Rt hemplegia LKW 230pm, [id number] NIHSS 24, CTH with L thalamic heme w/ IVH and HCP w/ extension into midbrain and terporoparietal lobe, intubated in ED for airway protecton and EVD placed, admitted to OSH MICU then on interval CTH with expansion of heme and ?spot sign on CTA, txer to [facility] for possible angio.    NEURO:  - neuro checks q1h  - no angio per neurosurgery   - failed EVD clamp trial 12/21 for high ICPs ~20min; raised EVD @20, monitor ICPs  - pain control  - sedation precedex 0.4 for vent synchrony; hold for now restart prn  - vEEG for right gaze     CV:  - SBP goal   - TTE, EF 67%, normal L ventricular function   - restart home losartan  - Rt axillary Aliyah placed 12/18    PULM:  Intubated at OSH ED for airway protection  - ETT to vent  - CXR daily  - mucomyst, nebs  - ABG  - wean to extubate    RENAL:  - Fluids: IVL DC HTS  - daily IOs  - IOs daily  - Na 145-155  - BMPqdh    GI:  - Diet: TF at goal  - GI prophylaxis: PPI while intubated  - Bowel regimen: miralax, senna    ENDO:   - FS goal 120-180  - synthroid    HEME/ONC:  - SCDs  - Chemoppx: SQL  - FOBT, iron panel sent for downtrending H&H  - repeat CBC tonight (recent axillary aliyah)    ID:  - febrile 12/21, pancultured  - f/u cultures    45 minutes of critical care time spent

## 2022-12-21 NOTE — PROGRESS NOTE ADULT - SUBJECTIVE AND OBJECTIVE BOX
NSCU Progress Note    Assessment/Hospital Course:    72F hx of HTN, DM who initially presented to [facility] 12/18 for worsening HA and Rt hemplegia LKW 230pm, [id number] NIHSS 24, CTH with L thalamic heme w/ IVH and HCP w/ extension into midbrain and terporoparietal lobe, intubated in ED for airway protecton and EVD placed, admitted to OSH MICU then on interval CTH with expansion of heme and ?spot sign on CTA, txer to [facility] for possible angio.      24 Hour Events/Subjective:  - SOft EVD exchange overnight d/t malfunction; this am EVD clamped, failed ~20min d/t high ICPs  - EVD@20, no ICP issues  - pan cultured, febrile overnight  - h/h trending down, will continue to monitor      REVIEW OF SYSTEMS:  - negative except as above    VITALS:   - Reviewed      IMAGING/DATA:   - Reviewed          PHYSICAL EXAM:    General: ett to vent  CVS: RR  Pulm: CTAB. mechanical bs  GI: Soft, NTND  Extremities: No LE Edema  Neuro:eyes close, no EO to noxious, does not follow commands, pupils 2mm b/l, gaze midline, +corneals, + cough, localizes with L arm AG, no movement in R arm, withdraws L leg in plane of bed, TF R leg

## 2022-12-21 NOTE — DIETITIAN INITIAL EVALUATION ADULT - ORAL INTAKE PTA/DIET HISTORY
Nephew at bedside at time of visit however asked to call daughter via phone for subjective information.   Per daughter, reports patient lives at home with nephew and nephews son whom she takes care of. Reports that ever since patients   a few years ago, daughter states noticing her moms PO intake decreasing because patient is sad. Daughter reports that patient will occasionally cook at home and reports no issues obtaining groceries. Reports that mom checking blood sugar levels often at home but unable to report on readings. Daughter reports that patients UBW is around 103-104 pounds from a few years ago but unsure of current weight and reports height as 5'1. Per chart review, height on file is 5'4".  Nephew at bedside at time of visit however asked to call daughter via phone for subjective information.   Per daughter, reports patient lives at home with nephew and nephews son whom she takes care of. Reports that ever since patients   a few years ago, daughter states noticing her moms PO intake decreasing because patient is sad.     Daughter reports that patient will occasionally cook at home and reports no issues obtaining groceries. Reports that patient checks blood sugar levels often at home but unable to report on readings and reports mom does not take medications for DM2. Noted per H&P that patient was taking insulin lispro 100 units 3x daily. Daughter reports that patients UBW is around 103-104 pounds from a few years ago but unsure of current weight and reports height as 5'1. Per chart review, height on file is 5'4". Daughter reports NKFA or food intolerances.  Nephew at bedside at time of visit however asked to call daughter via phone for subjective information.   Per daughter, reports patient lives at home with nephew and nephews son whom she takes care of. Reports that ever since patients   a few years ago, daughter states noticing her moms PO intake decreasing because patient is sad.     Daughter reports that patient will occasionally cook at home and reports no issues obtaining groceries. Reports that patient checks blood sugar levels often at home but unable to report on readings and reports mom does not take medications for DM2. Noted per H&P that patient was taking insulin lispro 100 units 3x daily.     Daughter reports that patients UBW is around 103-104 pounds from a few years ago but unsure of current weight and reports height as 5'1. Per chart review, height on file is 5'4". Daughter reports NKFA or food intolerances.

## 2022-12-21 NOTE — PROGRESS NOTE ADULT - ASSESSMENT
ASSESSMENT/PLAN:    72F hx of HTN, DM who initially presented to [facility] 12/18 for worsening HA and Rt hemplegia LKW 230pm, [id number] NIHSS 24, CTH with L thalamic heme w/ IVH and HCP w/ extension into midbrain and terporoparietal lobe, intubated in ED for airway protecton and EVD placed, admitted to OSH MICU then on interval CTH with expansion of heme and ?spot sign on CTA, txer to [facility] for possible angio.    NEURO:  - neuro checks q1h  - no need for immediate angio per nsg  - failed EVD clamp trial 12/21 for high ICPs ~20min; raised EVD @20, monitor ICPs  - pain control  - sedation precedex 0.4 for vent synchrony; hold for now restart prn    CVS:  - SBP goal   - TTE  - tele  - restart home losartan  - Rt axillary [place] placed 12/18    PULM:  Intubated at OSH ED for airway protection  - ETT to vent  - CXR daily  - mucomyst, nebs  - ABG  - wean to extubate    RENAL:  - Fluids: IVL DC HTS  - daily IOs  - IOs daily  - Na 145-155  - BMPqdh    GI:  - Diet: TF at goal  - GI prophylaxis: PPI while intubated  - Bowel regimen: miralax, senna    ENDO:   - FS goal 120-180  - synthroid    HEME/ONC:  - SCDs  - Chemoppx: SQL  - FOBT, iron panel  - repeat CBC tonight (recent axillary magaly)    ID:  - pancultured 12/20  - f/u cultures

## 2022-12-21 NOTE — PROVIDER CONTACT NOTE (OTHER) - ASSESSMENT
neurologically patient remains on unchanged. EVD with sluggish retrograde flow and no anterograde flow. hourly output from EVD of 5

## 2022-12-21 NOTE — DIETITIAN INITIAL EVALUATION ADULT - ADD RECOMMEND
- Recommend continuing Glucerna 1.2 60x 24 hours to provide (1440 ml, 1728 kcal (32 kcal/kg), 66 g protein (1.24 g protein/kg), 1162 ml free water) - Recommend continuing Glucerna 1.2 60x 24 hours to provide (1440 ml, 1728 kcal (32 kcal/kg), 86 g protein (1.6 g protein/kg), 1162 ml free water)  - Monitor labs, skin integrity, GI/ TF tolerance, weights, and bowel movement regularity   - RD remains available

## 2022-12-21 NOTE — DIETITIAN INITIAL EVALUATION ADULT - REASON
Patient intubated at time of visit and unable to provide consent.  Patient intubated/ventilated at time of visit and unable to provide consent.

## 2022-12-21 NOTE — DIETITIAN INITIAL EVALUATION ADULT - PERTINENT LABORATORY DATA
12/21  CAPILLARY BLOOD GLUCOSE  POCT Blood Glucose.: 150 mg/dL (21 Dec 2022 11:20)  POCT Blood Glucose.: 190 mg/dL (21 Dec 2022 05:01)  POCT Blood Glucose.: 225 mg/dL (20 Dec 2022 23:04)  POCT Blood Glucose.: 162 mg/dL (20 Dec 2022 16:47)  A1C 6.8% 12/21  Sodium (high) 154  Chloride (high) 117  Creatinine (low) 0.46  Glucose (high) 193  Phosphorus (low) 2.4

## 2022-12-21 NOTE — DIETITIAN INITIAL EVALUATION ADULT - REASON INDICATOR FOR ASSESSMENT
Length of stay  Collateral obtained from chart review and phone conversation with patients daughter #: [phone]

## 2022-12-21 NOTE — PROGRESS NOTE ADULT - SUBJECTIVE AND OBJECTIVE BOX
NSCU Progress Note    Assessment/Hospital Course:    72F hx of HTN, DM who initially presented to [facility] 12/18 for worsening HA and Rt hemplegia LKW 230pm, [id number] NIHSS 24, CTH with L thalamic heme w/ IVH and HCP w/ extension into midbrain and terporoparietal lobe, intubated in ED for airway protecton and EVD placed, admitted to OSH MICU then on interval CTH with expansion of heme and ?spot sign on CTA, txer to [facility] for possible angio.      24 Hour Events/Subjective:  - SOft EVD exchange overnight d/t malfunction; this am EVD clamped, failed ~20min d/t high ICPs  - EVD@20, no ICP issues  - pan cultured, febrile overnight  - h/h trending down, will continue to monitor      REVIEW OF SYSTEMS:  - negative except as above    VITALS:   - Reviewed      IMAGING/DATA:   - Reviewed          PHYSICAL EXAM:    General: ett to vent  CVS: RR  Pulm: CTAB. mechanical bs  GI: Soft, NTND  Extremities: No LE Edema  Neuro:eyes close, no EO to noxious, does not follow commands, pupils 2mm b/l, gaze midline, +corneals, + cough, localizes with L arm AG, no movement in R arm, withdraws L leg in plane of bed, TF R leg  NSCU Progress Note    Assessment/Hospital Course:    72F hx of HTN, DM who initially presented to [facility] 12/18 for worsening HA and Rt hemplegia LKW 230pm, [id number] NIHSS 24, CTH with L thalamic heme w/ IVH and HCP w/ extension into midbrain and terporoparietal lobe, intubated in ED for airway protecton and EVD placed, admitted to OSH MICU then on interval CTH with expansion of heme and ?spot sign on CTA, txer to [facility] for possible angio.      24 Hour Events/Subjective:  - SOft EVD exchange overnight d/t malfunction; this am EVD clamped, failed ~20min d/t high ICPs  - EVD@20, no ICP issues  - pan cultured, febrile overnight  - h/h trending down, will continue to monitor      REVIEW OF SYSTEMS:  - negative except as above    VITALS:   - Reviewed      IMAGING/DATA:   - Reviewed          PHYSICAL EXAM:    General: ett to vent  CVS: RR  Pulm: CTAB. mechanical bs  GI: Soft, NTND  Extremities: No LE Edema  Neuro: EO to noxious, does not follow commands, pupils 2mm b/l, right gaze but cannot cross midline, dysconjugate gaze, +corneals, + cough, L side spontaneous, trace withdraws on R side

## 2022-12-22 NOTE — PROVIDER CONTACT NOTE (OTHER) - ASSESSMENT
Pt. remains on full vent support, obtunded. Pt. withdraws to painful stimuli on L upper, R lower, and L lower extremity. No movement to painful stimuli on L upper. Pt. febrile, on cooling blanket s/p IV tylenol. See assessment flowsheet for additional findings

## 2022-12-22 NOTE — PROGRESS NOTE ADULT - ASSESSMENT
ASSESSMENT/PLAN:    72F hx of HTN, DM who initially presented to [facility] 12/18 for worsening HA and Rt hemplegia LKW 230pm, [id number] NIHSS 24, CTH with L thalamic heme w/ IVH and HCP w/ extension into midbrain and terporoparietal lobe, intubated in ED for airway protecton and EVD placed, admitted to OSH MICU then on interval CTH with expansion of heme and ?spot sign on CTA, txer to [facility] for possible angio.    NEURO:  - neuro checks q2h  - no need for immediate angio per nsg  - failed EVD clamp trial 12/21 for high ICPs ~20min; raised EVD @20, monitor ICPs  - pain control  - veeg dc   - Central fevers: add bromocriptine today  - sedation precedex 0.4 for vent synchrony; hold for now restart prn    CVS:  - SBP goal   - losartan  - amlodipine 5  - Rt axillary [place] placed 12/18    PULM:  Intubated at OSH ED for airway protection  - ETT to vent  - CXR daily  - mucomyst, nebs  - ABG  - wean to extubate as tolerated    RENAL:  - Fluids: IVL  - daily IOs  - IOs daily  - Na 145-155  - BMPqdh    GI:  - Diet: TF at goal  - GI prophylaxis: PPI while intubated  - Bowel regimen: miralax, senna  - last bm pta    ENDO:   - FS goal 120-180  - synthroid  - NPH 8q6h    HEME/ONC:  s/p 1uprbc 12/21 for drop in h/h  - SCDs  - Chemoppx: SQL  - trend cbc, repeat tongiht  - fobt    ID:  - pancultured 12/20  - f/u cultures    Dispo: ICU for mechanical ventillation, EVD ASSESSMENT/PLAN:    72F hx of HTN, DM who initially presented to [facility] 12/18 for worsening HA and Rt hemplegia LKW 230pm, [id number] NIHSS 24, CTH with L thalamic heme w/ IVH and HCP w/ extension into midbrain and terporoparietal lobe, intubated in ED for airway protecton and EVD placed, admitted to OSH MICU then on interval CTH with expansion of heme and ?spot sign on CTA, txer to [facility] for possible angio.    NEURO:  - neuro checks q2h  - no need for immediate angio per nsg  - failed EVD clamp trial 12/21 for high ICPs ~20min; raised EVD @20, monitor ICPs  - EVD dropped to 15 overnight due to ICP crisis during the day   - pain control  - veeg dc   - Central fevers: add bromocriptine today  - sedation precedex 0.4 for vent synchrony; hold for now restart prn    CVS:  - SBP goal   - losartan  - amlodipine 5  - Rt axillary [place] placed 12/18    PULM:  Intubated at OSH ED for airway protection  - ETT to vent  - CXR daily  - mucomyst, nebs  - ABG  - wean to extubate as tolerated    RENAL:  - Fluids: IVL  - daily IOs  - IOs daily  - Na 145-155  - BMPqdh    GI:  - Diet: TF at goal  - GI prophylaxis: PPI while intubated  - Bowel regimen: miralax, senna  - last bm pta    ENDO:   - FS goal 120-180  - synthroid  - NPH 8q6h    HEME/ONC:  s/p 1uprbc 12/21 for drop in h/h  - SCDs  - Chemoppx: SQL  - trend cbc, repeat tongiht  - fobt    ID:  - pancultured 12/20  - f/u cultures    Dispo: ICU for mechanical ventillation, EVD

## 2022-12-22 NOTE — PROGRESS NOTE ADULT - ASSESSMENT
ASSESSMENT/PLAN:    72F hx of HTN, DM who initially presented to [facility] 12/18 for worsening HA and Rt hemplegia LKW 230pm, [id number] NIHSS 24, CTH with L thalamic heme w/ IVH and HCP w/ extension into midbrain and terporoparietal lobe, intubated in ED for airway protecton and EVD placed, admitted to OSH MICU then on interval CTH with expansion of heme and ?spot sign on CTA, txer to [facility] for possible angio.    NEURO:  - neuro checks q1h  - no need for immediate angio per nsg  - failed EVD clamp trial 12/21 for high ICPs ~20min; raised EVD @20, monitor ICPs  - pain control  - sedation precedex 0.4 for vent synchrony; hold for now restart prn    CVS:  - SBP goal   - TTE  - tele  - restart home losartan  - Rt axillary [place] placed 12/18    PULM:  Intubated at OSH ED for airway protection  - ETT to vent  - CXR daily  - mucomyst, nebs  - ABG  - wean to extubate    RENAL:  - Fluids: IVL DC HTS  - daily IOs  - IOs daily  - Na 145-155  - BMPqdh    GI:  - Diet: TF at goal  - GI prophylaxis: PPI while intubated  - Bowel regimen: miralax, senna    ENDO:   - FS goal 120-180  - synthroid    HEME/ONC:  - SCDs  - Chemoppx: SQL  - FOBT, iron panel  - repeat CBC tonight (recent axillary magaly)    ID:  - pancultured 12/20  - f/u cultures     ASSESSMENT/PLAN:    72F hx of HTN, DM who initially presented to [facility] 12/18 for worsening HA and Rt hemplegia LKW 230pm, [id number] NIHSS 24, CTH with L thalamic heme w/ IVH and HCP w/ extension into midbrain and terporoparietal lobe, intubated in ED for airway protecton and EVD placed, admitted to OSH MICU then on interval CTH with expansion of heme and ?spot sign on CTA, txer to [facility] for possible angio.    NEURO:  - neuro checks q1h  - no need for immediate angio per nsg  - failed EVD clamp trial 12/21 for high ICPs ~20min; raised EVD @20, monitor ICPs  - pain control  - veeg  - sedation precedex 0.4 for vent synchrony; hold for now restart prn    CVS:  - SBP goal   - losartan  - Rt axillary [place] placed 12/18    PULM:  Intubated at OSH ED for airway protection  - ETT to vent  - CXR daily  - mucomyst, nebs  - ABG  - wean to extubate as tolerated    RENAL:  - Fluids: IVL DC HTS  - daily IOs  - IOs daily  - Na 145-155  - BMPqdh    GI:  - Diet: TF at goal  - GI prophylaxis: PPI while intubated  - Bowel regimen: miralax, senna    ENDO:   - FS goal 120-180  - synthroid    HEME/ONC:  - SCDs  - Chemoppx: SQL  - trend cbc    ID:  - pancultured 12/20  - f/u cultures     ASSESSMENT/PLAN:    72F hx of HTN, DM who initially presented to [facility] 12/18 for worsening HA and Rt hemplegia LKW 230pm, [id number] NIHSS 24, CTH with L thalamic heme w/ IVH and HCP w/ extension into midbrain and terporoparietal lobe, intubated in ED for airway protecton and EVD placed, admitted to OSH MICU then on interval CTH with expansion of heme and ?spot sign on CTA, txer to [facility] for possible angio.    NEURO:  - neuro checks q2h  - no need for immediate angio per nsg  - failed EVD clamp trial 12/21 for high ICPs ~20min; raised EVD @20, monitor ICPs  - pain control  - veeg dc   - Central fevers: add bromocriptine today  - sedation precedex 0.4 for vent synchrony; hold for now restart prn    CVS:  - SBP goal   - losartan  - amlodipine 5  - Rt axillary [place] placed 12/18    PULM:  Intubated at OSH ED for airway protection  - ETT to vent  - CXR daily  - mucomyst, nebs  - ABG  - wean to extubate as tolerated    RENAL:  - Fluids: IVL  - daily IOs  - IOs daily  - Na 145-155  - BMPqdh    GI:  - Diet: TF at goal  - GI prophylaxis: PPI while intubated  - Bowel regimen: miralax, senna  - last bm pta    ENDO:   - FS goal 120-180  - synthroid  - NPH 8q6h    HEME/ONC:  s/p 1uprbc 12/21 for drop in h/h  - SCDs  - Chemoppx: SQL  - trend cbc, repeat tongiht  - fobt    ID:  - pancultured 12/20  - f/u cultures    Dispo: ICU for mechanical ventillation, EVD

## 2022-12-22 NOTE — CHART NOTE - NSCHARTNOTEFT_GEN_A_CORE
Under sterile technique, EVD was clamped and 3 cc of CSF was aspirated from EVD without resistance or difficulty. Patient tolerated procedure well.

## 2022-12-22 NOTE — PROGRESS NOTE ADULT - SUBJECTIVE AND OBJECTIVE BOX
NSCU Progress Note    Assessment/Hospital Course:    72F hx of HTN, DM who initially presented to [facility] 12/18 for worsening HA and Rt hemplegia LKW 230pm, [id number] NIHSS 24, CTH with L thalamic heme w/ IVH and HCP w/ extension into midbrain and terporoparietal lobe, intubated in ED for airway protecton and EVD placed, admitted to OSH MICU then on interval CTH with expansion of heme and ?spot sign on CTA, txer to [facility] for possible angio.      24 Hour Events/Subjective:  - EVD@20, no ICP issues  - pan cultured, febrile overnight  - h/h trending down, given 1uprbc overnight  - eeg negative for seizures      REVIEW OF SYSTEMS:  - negative except as above    VITALS:   - Reviewed      IMAGING/DATA:   - Reviewed          PHYSICAL EXAM:    General: ett to vent  CVS: RR  Pulm: CTAB. mechanical bs  GI: Soft, NTND  Extremities: No LE Edema  Neuro:eyes close, no EO to noxious, does not follow commands, pupils 2mm b/l, gaze midline, +corneals, + cough, localizes with L arm AG, no movement in R arm, withdraws L leg in plane of bed, TF R leg  NSCU Progress Note    Assessment/Hospital Course:    72F hx of HTN, DM who initially presented to [facility] 12/18 for worsening HA and Rt hemplegia LKW 230pm, [id number] NIHSS 24, CTH with L thalamic heme w/ IVH and HCP w/ extension into midbrain and terporoparietal lobe, intubated in ED for airway protecton and EVD placed, admitted to OSH MICU then on interval CTH with expansion of heme and ?spot sign on CTA, txer to [facility] for possible angio.      24 Hour Events/Subjective:  - EVD dropped to 15, Episode of ICP spike during maikol day   - pan cultured, febrile overnight  - h/h trending down, given 1u prbc overnight  - eeg negative for seizures      REVIEW OF SYSTEMS:  - negative except as above    VITALS:   - Reviewed      IMAGING/DATA:   - Reviewed          PHYSICAL EXAM:    General: ett to vent  CVS: RR  Pulm: CTAB. mechanical bs  GI: Soft, NTND  Extremities: No LE Edema  Neuro:eyes close, no EO to noxious, does not follow commands, pupils 2mm b/l, gaze midline, +corneals, + cough, localizes with L arm AG, no movement in R arm, withdraws L leg in plane of bed, TF R leg

## 2022-12-22 NOTE — PROGRESS NOTE ADULT - SUBJECTIVE AND OBJECTIVE BOX
NSCU Progress Note    Assessment/Hospital Course:    72F hx of HTN, DM who initially presented to [facility] 12/18 for worsening HA and Rt hemplegia LKW 230pm, [id number] NIHSS 24, CTH with L thalamic heme w/ IVH and HCP w/ extension into midbrain and terporoparietal lobe, intubated in ED for airway protecton and EVD placed, admitted to OSH MICU then on interval CTH with expansion of heme and ?spot sign on CTA, txer to [facility] for possible angio.      24 Hour Events/Subjective:  - EVD@10, no ICP issues  - pan cultured, febrile overnight  - h/h trending down, will continue to monitor      REVIEW OF SYSTEMS:  - negative except as above    VITALS:   - Reviewed      IMAGING/DATA:   - Reviewed          PHYSICAL EXAM:    General: ett to vent  CVS: RR  Pulm: CTAB. mechanical bs  GI: Soft, NTND  Extremities: No LE Edema  Neuro:eyes close, no EO to noxious, does not follow commands, pupils 2mm b/l, gaze midline, +corneals, + cough, localizes with L arm AG, no movement in R arm, withdraws L leg in plane of bed, TF R leg  NSCU Progress Note    Assessment/Hospital Course:    72F hx of HTN, DM who initially presented to [facility] 12/18 for worsening HA and Rt hemplegia LKW 230pm, [id number] NIHSS 24, CTH with L thalamic heme w/ IVH and HCP w/ extension into midbrain and terporoparietal lobe, intubated in ED for airway protecton and EVD placed, admitted to OSH MICU then on interval CTH with expansion of heme and ?spot sign on CTA, txer to [facility] for possible angio.      24 Hour Events/Subjective:  - EVD@20, no ICP issues  - pan cultured, febrile overnight  - h/h trending down, given 1uprbc overnight  - eeg negative for seizures      REVIEW OF SYSTEMS:  - negative except as above    VITALS:   - Reviewed      IMAGING/DATA:   - Reviewed          PHYSICAL EXAM:    General: ett to vent  CVS: RR  Pulm: CTAB. mechanical bs  GI: Soft, NTND  Extremities: No LE Edema  Neuro:eyes close, no EO to noxious, does not follow commands, pupils 2mm b/l, gaze midline, +corneals, + cough, localizes with L arm AG, no movement in R arm, withdraws L leg in plane of bed, TF R leg

## 2022-12-22 NOTE — EEG REPORT - NS EEG TEXT BOX
BRENDA SHIPLEY [id number]     Study Start Date: 0200 12/22/22  Study End Date: 0800 12/22/22	  Duration x Hours:  6 hr   --------------------------------------------------------------------------------------------------    History:  CC/ HPI Patient is a 72y old  Female who presents with a chief complaint of thalamic iph (22 Dec 2022 06:09)    MEDICATIONS  (STANDING):  chlorhexidine 0.12% Liquid 15 milliLiter(s) Oral Mucosa every 12 hours  chlorhexidine 4% Liquid 1 Application(s) Topical <User Schedule>  dexMEDEtomidine Infusion 0.2 MICROgram(s)/kG/Hr (2.66 mL/Hr) IV Continuous <Continuous>  enoxaparin Injectable 40 milliGRAM(s) SubCutaneous every 24 hours  insulin lispro (ADMELOG) corrective regimen sliding scale   SubCutaneous every 6 hours  insulin NPH human recombinant 4 Unit(s) SubCutaneous every 6 hours  levothyroxine 25 MICROGram(s) Oral daily  losartan 100 milliGRAM(s) Enteral Tube daily  pantoprazole  Injectable 40 milliGRAM(s) IV Push daily  polyethylene glycol 3350 17 Gram(s) Oral every 12 hours  senna Syrup 10 milliLiter(s) Oral at bedtime      --------------------------------------------------------------------------------------------------  Study Interpretation:    [[[Abbreviation Key:  PDR=alpha rhythm/posterior dominant rhythm. A-P=anterior posterior.  Amplitude: ‘very low’:<20; ‘low’:20-49; ‘medium’:; ‘high’:>150uV.  Persistence for periodic/rhythmic patterns (% of epoch) ‘rare’:<1%; ‘occasional’:1-10%; ‘frequent’:10-50%; ‘abundant’:50-90%; ‘continuous’:>90%.  Persistence for sporadic discharges: ‘rare’:<1/hr; ‘occasional’:1/min-1/hr; ‘frequent’:>1/min; ‘abundant’:>1/10 sec.  RPP=rhythmic and periodic patterns; GRDA=generalized rhythmic delta activity; FIRDA=frontal intermittent GRDA; LRDA=lateralized rhythmic delta activity; TIRDA=temporal intermittent rhythmic delta activity;  LPD=PLED=lateralized periodic discharges; GPD=generalized periodic discharges; BIPDs =bilateral independent periodic discharges; Mf=multifocal; SIRPDs=stimulus induced rhythmic, periodic, or ictal appearing discharges; BIRDs=brief potentially ictal rhythmic discharges >4 Hz, lasting .5-10s; PFA (paroxysmal bursts >13 Hz or =8 Hz <10s).  Modifiers: +F=with fast component; +S=with spike component; +R=with rhythmic component.  S-B=burst suppression pattern.  Max=maximal. N1-drowsy; N2-stage II sleep; N3-slow wave sleep. SSS/BETS=small sharp spikes/benign epileptiform transients of sleep. HV=hyperventilation; PS=photic stimulation]]]    Daily EEG Visual Analysis    FINDINGS:      Background:  Continuous: continuous  Symmetry: asymmetric, asynchronous   PDR: no PDR  Reactivity: present  Voltage: normal, mostly 20-150uV  Anterior Posterior Gradient: present  Other background findings: none  Breach: absent    Background Slowing:  Generalized slowing: Diffuse theta and delta slowing.   Focal slowing: Continuos loss of faster frequencies in left parietotemporal region    State Changes:   Absent    Sporadic Epileptiform Discharges:    None    Rhythmic and Periodic Patterns (RPPs):  None     Electrographic and Electroclinical seizures:  None    Other Clinical Events:  None    Activation Procedures:   Hyperventilation was not performed.    Photic stimulation was not performed.    Artifacts:  Intermittent myogenic and movement artifacts were noted.    ECG:  The heart rate on single channel ECG was predominantly between 60-80 BPM.    EEG Classification / Summary:    Abnormal  EEG in a comatose patient.     - Continuos loss of faster frequencies in left parietotemporal region  - Background slowing, generalized, moderate      Clinical Impression:    - Regional structural abnormality in the left parietotemporal region  - Moderate diffuse /multifocal cerebral dysfunction.   - There were no epileptiform abnormalities recorded.      This is a preliminary report     Dixie Mosley M.D.   Epilepsy fellow    -------------------------------------------------------------------------------------------------------  [facility] EEG Reading Room Ph#: [phone]  Epilepsy Answering Service after 5PM and before 8:30AM: Ph#: [phone]     BRENDA SHIPLEY [id number]     Study Start Date: 0200 12/22/22  Study End Date: 0800 12/22/22	  Duration x Hours:  6 hr   --------------------------------------------------------------------------------------------------    History:  CC/ HPI Patient is a 72y old  Female who presents with a chief complaint of thalamic iph (22 Dec 2022 06:09)    MEDICATIONS  (STANDING):  chlorhexidine 0.12% Liquid 15 milliLiter(s) Oral Mucosa every 12 hours  chlorhexidine 4% Liquid 1 Application(s) Topical <User Schedule>  dexMEDEtomidine Infusion 0.2 MICROgram(s)/kG/Hr (2.66 mL/Hr) IV Continuous <Continuous>  enoxaparin Injectable 40 milliGRAM(s) SubCutaneous every 24 hours  insulin lispro (ADMELOG) corrective regimen sliding scale   SubCutaneous every 6 hours  insulin NPH human recombinant 4 Unit(s) SubCutaneous every 6 hours  levothyroxine 25 MICROGram(s) Oral daily  losartan 100 milliGRAM(s) Enteral Tube daily  pantoprazole  Injectable 40 milliGRAM(s) IV Push daily  polyethylene glycol 3350 17 Gram(s) Oral every 12 hours  senna Syrup 10 milliLiter(s) Oral at bedtime      --------------------------------------------------------------------------------------------------  Study Interpretation:    [[[Abbreviation Key:  PDR=alpha rhythm/posterior dominant rhythm. A-P=anterior posterior.  Amplitude: ‘very low’:<20; ‘low’:20-49; ‘medium’:; ‘high’:>150uV.  Persistence for periodic/rhythmic patterns (% of epoch) ‘rare’:<1%; ‘occasional’:1-10%; ‘frequent’:10-50%; ‘abundant’:50-90%; ‘continuous’:>90%.  Persistence for sporadic discharges: ‘rare’:<1/hr; ‘occasional’:1/min-1/hr; ‘frequent’:>1/min; ‘abundant’:>1/10 sec.  RPP=rhythmic and periodic patterns; GRDA=generalized rhythmic delta activity; FIRDA=frontal intermittent GRDA; LRDA=lateralized rhythmic delta activity; TIRDA=temporal intermittent rhythmic delta activity;  LPD=PLED=lateralized periodic discharges; GPD=generalized periodic discharges; BIPDs =bilateral independent periodic discharges; Mf=multifocal; SIRPDs=stimulus induced rhythmic, periodic, or ictal appearing discharges; BIRDs=brief potentially ictal rhythmic discharges >4 Hz, lasting .5-10s; PFA (paroxysmal bursts >13 Hz or =8 Hz <10s).  Modifiers: +F=with fast component; +S=with spike component; +R=with rhythmic component.  S-B=burst suppression pattern.  Max=maximal. N1-drowsy; N2-stage II sleep; N3-slow wave sleep. SSS/BETS=small sharp spikes/benign epileptiform transients of sleep. HV=hyperventilation; PS=photic stimulation]]]    Daily EEG Visual Analysis    FINDINGS:      Background:  Continuous: continuous  Symmetry: asymmetric, asynchronous   PDR: no PDR  Reactivity: present  Voltage: normal, mostly 20-150uV  Anterior Posterior Gradient: present  Other background findings: none  Breach: absent    Background Slowing:  Generalized slowing: Diffuse theta and delta slowing.   Focal slowing: Continuos loss of faster frequencies in left parietotemporal region    State Changes:   Absent    Sporadic Epileptiform Discharges:    None    Rhythmic and Periodic Patterns (RPPs):  None     Electrographic and Electroclinical seizures:  None    Other Clinical Events:  None    Activation Procedures:   Hyperventilation was not performed.    Photic stimulation was not performed.    Artifacts:  Intermittent myogenic and movement artifacts were noted.    ECG:  The heart rate on single channel ECG was predominantly between 60-80 BPM.    EEG Classification / Summary:    Abnormal  EEG in a comatose patient.     - Continuos loss of faster frequencies in left parietotemporal region  - Background slowing, generalized, moderate      Clinical Impression:    - Regional structural abnormality in the left parietotemporal region  - Moderate diffuse /multifocal cerebral dysfunction.   - There were no epileptiform abnormalities recorded.        Dixie Mosley M.D.   Epilepsy fellow    Antonio Chawla MD  EEG/Epilepsy Attending     -------------------------------------------------------------------------------------------------------  [facility] EEG Reading Room Ph#: [phone]  Epilepsy Answering Service after 5PM and before 8:30AM: Ph#: [phone]     BRENDA SHIPLEY [id number]     Study Start Date: 0200 12/22/22  Study End Date: 1200 12/22/22	  Duration x Hours:  10 hr    --------------------------------------------------------------------------------------------------    History:  CC/ HPI Patient is a 72y old  Female who presents with a chief complaint of thalamic iph (22 Dec 2022 06:09)    MEDICATIONS  (STANDING):  chlorhexidine 0.12% Liquid 15 milliLiter(s) Oral Mucosa every 12 hours  chlorhexidine 4% Liquid 1 Application(s) Topical <User Schedule>  dexMEDEtomidine Infusion 0.2 MICROgram(s)/kG/Hr (2.66 mL/Hr) IV Continuous <Continuous>  enoxaparin Injectable 40 milliGRAM(s) SubCutaneous every 24 hours  insulin lispro (ADMELOG) corrective regimen sliding scale   SubCutaneous every 6 hours  insulin NPH human recombinant 4 Unit(s) SubCutaneous every 6 hours  levothyroxine 25 MICROGram(s) Oral daily  losartan 100 milliGRAM(s) Enteral Tube daily  pantoprazole  Injectable 40 milliGRAM(s) IV Push daily  polyethylene glycol 3350 17 Gram(s) Oral every 12 hours  senna Syrup 10 milliLiter(s) Oral at bedtime      --------------------------------------------------------------------------------------------------  Study Interpretation:    [[[Abbreviation Key:  PDR=alpha rhythm/posterior dominant rhythm. A-P=anterior posterior.  Amplitude: ‘very low’:<20; ‘low’:20-49; ‘medium’:; ‘high’:>150uV.  Persistence for periodic/rhythmic patterns (% of epoch) ‘rare’:<1%; ‘occasional’:1-10%; ‘frequent’:10-50%; ‘abundant’:50-90%; ‘continuous’:>90%.  Persistence for sporadic discharges: ‘rare’:<1/hr; ‘occasional’:1/min-1/hr; ‘frequent’:>1/min; ‘abundant’:>1/10 sec.  RPP=rhythmic and periodic patterns; GRDA=generalized rhythmic delta activity; FIRDA=frontal intermittent GRDA; LRDA=lateralized rhythmic delta activity; TIRDA=temporal intermittent rhythmic delta activity;  LPD=PLED=lateralized periodic discharges; GPD=generalized periodic discharges; BIPDs =bilateral independent periodic discharges; Mf=multifocal; SIRPDs=stimulus induced rhythmic, periodic, or ictal appearing discharges; BIRDs=brief potentially ictal rhythmic discharges >4 Hz, lasting .5-10s; PFA (paroxysmal bursts >13 Hz or =8 Hz <10s).  Modifiers: +F=with fast component; +S=with spike component; +R=with rhythmic component.  S-B=burst suppression pattern.  Max=maximal. N1-drowsy; N2-stage II sleep; N3-slow wave sleep. SSS/BETS=small sharp spikes/benign epileptiform transients of sleep. HV=hyperventilation; PS=photic stimulation]]]    Daily EEG Visual Analysis    FINDINGS:      Background:  Continuous: continuous  Symmetry: asymmetric, asynchronous   PDR: no PDR  Reactivity: present  Voltage: normal, mostly 20-150uV  Anterior Posterior Gradient: present  Other background findings: none  Breach: absent    Background Slowing:  Generalized slowing: Diffuse theta and delta slowing.   Focal slowing: Continuos loss of faster frequencies in left parietotemporal region    State Changes:   Absent    Sporadic Epileptiform Discharges:    None    Rhythmic and Periodic Patterns (RPPs):  None     Electrographic and Electroclinical seizures:  None    Other Clinical Events:  None    Activation Procedures:   Hyperventilation was not performed.    Photic stimulation was not performed.    Artifacts:  Intermittent myogenic and movement artifacts were noted.    ECG:  The heart rate on single channel ECG was predominantly between 60-80 BPM.    EEG Classification / Summary:    Abnormal  EEG in a comatose patient.     - Continuos loss of faster frequencies in left parietotemporal region  - Background slowing, generalized, moderate      Clinical Impression:    - Regional structural abnormality in the left parietotemporal region  - Moderate diffuse /multifocal cerebral dysfunction.   - There were no epileptiform abnormalities recorded.        Dixie Mosley M.D.   Epilepsy fellow    Antonio Chawla MD  EEG/Epilepsy Attending     -------------------------------------------------------------------------------------------------------  [facility] EEG Reading Room Ph#: [phone]  Epilepsy Answering Service after 5PM and before 8:30AM: Ph#: [phone]

## 2022-12-23 NOTE — PROGRESS NOTE ADULT - ASSESSMENT
ASSESSMENT/PLAN:    72F hx of HTN, DM who initially presented to [facility] 12/18 for worsening HA and Rt hemplegia LKW 230pm, [id number] NIHSS 24, CTH with L thalamic heme w/ IVH and HCP w/ extension into midbrain and terporoparietal lobe, intubated in ED for airway protecton and EVD placed, admitted to OSH MICU then on interval CTH with expansion of heme and ?spot sign on CTA, txer to [facility] for possible angio.    NEURO:  - neuro checks q2h  - no need for immediate angio per nsg  - failed EVD clamp trial 12/21 for high ICPs ~20min; raised EVD @20, monitor ICPs  - EVD dropped to 15 overnight due to ICP crisis during the day   - pain control  - veeg dc   - Central fevers: add bromocriptine today  - sedation precedex 0.4 for vent synchrony; hold for now restart prn    CVS:  - SBP goal   - losartan  - amlodipine 5  - Rt axillary [place] placed 12/18    PULM:  Intubated at OSH ED for airway protection  - ETT to vent  - CXR daily  - mucomyst, nebs  - ABG  - wean to extubate as tolerated    RENAL:  - Fluids: IVL  - daily IOs  - IOs daily  - Na 145-155  - BMPqdh    GI:  - Diet: TF at goal  - GI prophylaxis: PPI while intubated  - Bowel regimen: miralax, senna  - last bm pta    ENDO:   - FS goal 120-180  - synthroid  - NPH 8q6h    HEME/ONC:  s/p 1uprbc 12/21 for drop in h/h  - SCDs  - Chemoppx: SQL  - trend cbc, repeat tongiht  - fobt    ID:  - pancultured 12/20  - f/u cultures    Dispo: ICU for mechanical ventillation, EVD ASSESSMENT/PLAN:    72F hx of HTN, DM who initially presented to [facility] 12/18 for worsening HA and Rt hemplegia LKW 230pm, [id number] NIHSS 24, CTH with L thalamic heme w/ IVH and HCP w/ extension into midbrain and terporoparietal lobe, intubated in ED for airway protecton and EVD placed, admitted to OSH MICU then on interval CTH with expansion of heme and ?spot sign on CTA, txer to [facility] for possible angio.    NEURO:  - neuro checks q2h  - no need for immediate angio per nsg  - failed EVD clamp trial 12/21 for high ICPs ~20min; raised EVD @20, monitor ICPs  - EVD dropped to 15 overnight due to ICP crisis during the day   - pain control  - Central fevers: bromocriptine today  - sedation precedex 0.4 for vent synchrony; hold for now restart prn    CVS:  - SBP goal   - losartan  - amlodipine 5  - Rt axillary [place] placed 12/18    PULM:  Intubated at OSH ED for airway protection  - ETT to vent  - CXR daily  - mucomyst, nebs  - ABG  - wean to extubate as tolerated    RENAL:  - Fluids: IVL  - daily IOs  - IOs daily  - Na 145-155  - BMPqdh    GI:  - Diet: TF at goal  - GI prophylaxis: PPI while intubated  - Bowel regimen: miralax, senna  - last bm pta    ENDO:   - FS goal 120-180  - synthroid  - NPH 10q6h    HEME/ONC:  s/p 1uprbc 12/21 for drop in h/h  - SCDs  - Chemoppx: SQL  - trend h/h  - fobt    ID:  - pancultured 12/20  - f/u cultures    Dispo: ICU for mechanical ventillation, EVD

## 2022-12-23 NOTE — PROGRESS NOTE ADULT - SUBJECTIVE AND OBJECTIVE BOX
NSCU Progress Note    Assessment/Hospital Course:    72F hx of HTN, DM who initially presented to [facility] 12/18 for worsening HA and Rt hemplegia LKW 230pm, [id number] NIHSS 24, CTH with L thalamic heme w/ IVH and HCP w/ extension into midbrain and terporoparietal lobe, intubated in ED for airway protecton and EVD placed, admitted to OSH MICU then on interval CTH with expansion of heme and ?spot sign on CTA, txer to [facility] for possible angio.      24 Hour Events/Subjective:  - EVD@15, no ICP issues  - high ICPs overnight, drain lowered to 15  - h/h stable      REVIEW OF SYSTEMS:  - negative except as above    VITALS:   - Reviewed      IMAGING/DATA:   - Reviewed          PHYSICAL EXAM:    General: ett to vent  CVS: RR  Pulm: CTAB. mechanical bs  GI: Soft, NTND  Extremities: No LE Edema  Neuro:eyes close, no EO to noxious, does not follow commands, pupils 2mm b/l, gaze midline, +corneals, + cough, localizes with L arm AG, no movement in R arm, withdraws L leg in plane of bed, TF R leg  NSCU Progress Note    Assessment/Hospital Course:    72F hx of HTN, DM who initially presented to [facility] 12/18 for worsening HA and Rt hemplegia LKW 230pm, [id number] NIHSS 24, CTH with L thalamic heme w/ IVH and HCP w/ extension into midbrain and terporoparietal lobe, intubated in ED for airway protecton and EVD placed, admitted to OSH MICU then on interval CTH with expansion of heme and ?spot sign on CTA, txer to [facility] for possible angio.      24 Hour Events/Subjective:  - EVD@15, no ICP issues currently   - high ICPs overnight, drain lowered to 15, EVD soft passed during the day, post soft pass CT performed   - febrile, pancultured, f/u cultures       REVIEW OF SYSTEMS:  - negative except as above    VITALS:   - Reviewed      IMAGING/DATA:   - Reviewed    PHYSICAL EXAM:    General: ett to vent  CVS: RR  Pulm: CTAB. mechanical bs  GI: Soft, NTND  Extremities: No LE Edema  Neuro: eyes close, no EO to noxious, does not follow commands, pupils 2mm b/l, gaze midline, +corneals, + cough, localizes with L arm AG, no movement in R arm, withdraws L leg in plane of bed, TF R leg  NSCU Progress Note    Assessment/Hospital Course:    72F hx of HTN, DM who initially presented to [facility] 12/18 for worsening HA and Rt hemplegia LKW 230pm, [id number] NIHSS 24, CTH with L thalamic heme w/ IVH and HCP w/ extension into midbrain and terporoparietal lobe, intubated in ED for airway protecton and EVD placed, admitted to OSH MICU then on interval CTH with expansion of heme and ?spot sign on CTA, txer to [facility] for possible angio.      24 Hour Events/Subjective:  - EVD@15, no ICP issues currently   - high ICPs overnight, drain lowered to 15, EVD soft passed during the day, post soft pass CT performed   - febrile, pancultured, f/u cultures       REVIEW OF SYSTEMS:  - negative except as above    VITALS:   - Reviewed      IMAGING/DATA:   - Reviewed    PHYSICAL EXAM:    General: ett to vent  CVS: RR  Pulm: CTAB. mechanical bs  GI: Soft, NTND  Extremities: No LE Edema  Neuro: Intubated, EO to stim, Rt gaze, cannot crose midline, L pupil 3 and NR, R pupil 3 and brisk, +cough/gag/corneal, not FC, RUE trace movement, LUE spont, BLE spont   8

## 2022-12-23 NOTE — PROVIDER CONTACT NOTE (CHANGE IN STATUS NOTIFICATION) - ASSESSMENT
Change in LUE examination from localizing to withdrawal. Bilateral pupils 2RS, RUE NE, Bilateral lower extremities withdrawal to painful stimuli.
Pt. intubated, not following commands, withdrawing to pain at this time

## 2022-12-23 NOTE — PROGRESS NOTE ADULT - ASSESSMENT
ASSESSMENT/PLAN:    72F hx of HTN, DM who initially presented to [facility] 12/18 for worsening HA and Rt hemplegia LKW 230pm, [id number] NIHSS 24, CTH with L thalamic heme w/ IVH and HCP w/ extension into midbrain and terporoparietal lobe, intubated in ED for airway protecton and EVD placed, admitted to OSH MICU then on interval CTH with expansion of heme and ?spot sign on CTA, txer to [facility] for possible angio.    NEURO:  - neuro checks q2h  - no need for immediate angio per nsg  - failed EVD clamp trial 12/21 for high ICPs ~20min; raised EVD @20, monitor ICPs  - EVD dropped to 15 overnight due to ICP crisis during the day   - pain control  - Central fevers: bromocriptine today  - sedation precedex 0.4 for vent synchrony; hold for now restart prn    CVS:  - SBP goal   - losartan  - amlodipine 5  - Rt axillary Aliyah placed 12/18    PULM:  Intubated at OSH ED for airway protection  - ETT to vent  - CXR daily  - mucomyst, nebs  - ABG  - wean to extubate as tolerated    RENAL:  - Fluids: IVL  - daily IOs  - IOs daily  - Na 145-155  - BMPqdh    GI:  - Diet: TF at goal  - GI prophylaxis: PPI while intubated  - Bowel regimen: miralax, senna  - last bm pta    ENDO:   - FS goal 120-180  - synthroid  - NPH 10q6h    HEME/ONC:  s/p 1uprbc 12/21 for drop in h/h  - SCDs  - Chemoppx: SQL  - trend h/h  - fobt    ID:  - pancultured 12/20  - f/u cultures    Dispo: ICU for mechanical ventillation, EVD ASSESSMENT/PLAN:    72F hx of HTN, DM who initially presented to [facility] 12/18 for worsening HA and Rt hemplegia LKW 230pm, [id number] NIHSS 24, CTH with L thalamic heme w/ IVH and HCP w/ extension into midbrain and terporoparietal lobe, intubated in ED for airway protecton and EVD placed, admitted to OSH MICU then on interval CTH with expansion of heme and ?spot sign on CTA, txer to [facility] for possible angio.    NEURO:  - neuro checks q2h  - no need for immediate angio per nsg  - failed EVD clamp trial 12/21 for high ICPs ~20min; raised EVD @20, monitor ICPs  - EVD dropped to 15 overnight due to ICP crisis during the day  - EVD soft passed today, f/u CT done   - pain control  - Central fevers: bromocriptine today  - sedation Precedex 0.4 for vent synchrony; hold for now restart prn    CVS:  - SBP goal   - losartan  - amlodipine 5  - Rt axillary [place] placed 12/18  - started on labetolol 100mg q8     PULM:  Intubated at OSH ED for airway protection  - ETT to vent  - CXR daily  - mucomyst, nebs  - ABG  - wean to extubate as tolerated    RENAL:  - Fluids: IVL  - daily IOs  - IOs daily  - Na 145-155  - BMPqdh    GI:  - Diet: TF at goal  - GI prophylaxis: PPI while intubated  - Bowel regimen: miralax, senna  - last BM 12/23     ENDO:   - FS goal 120-180  - synthroid  - NPH 10q6h increased to 14q6h     HEME/ONC:  s/p 1uprbc 12/21 for drop in h/h  - SCDs  - Chemoppx: SQL  - Trend h/h  - FOBT    ID:  - pancultured 12/23  - f/u cultures    Dispo: ICU for mechanical ventillation, EVD    45 minutes of critical care time provided

## 2022-12-23 NOTE — PROGRESS NOTE ADULT - SUBJECTIVE AND OBJECTIVE BOX
NSCU Progress Note    Assessment/Hospital Course:    72F hx of HTN, DM who initially presented to [facility] 12/18 for worsening HA and Rt hemplegia LKW 230pm, [id number] NIHSS 24, CTH with L thalamic heme w/ IVH and HCP w/ extension into midbrain and terporoparietal lobe, intubated in ED for airway protecton and EVD placed, admitted to OSH MICU then on interval CTH with expansion of heme and ?spot sign on CTA, txer to [facility] for possible angio.      24 Hour Events/Subjective:  - EVD@20, no ICP issues  - pan cultured, febrile overnight  - h/h trending down, given 1uprbc overnight  - eeg negative for seizures      REVIEW OF SYSTEMS:  - negative except as above    VITALS:   - Reviewed      IMAGING/DATA:   - Reviewed          PHYSICAL EXAM:    General: ett to vent  CVS: RR  Pulm: CTAB. mechanical bs  GI: Soft, NTND  Extremities: No LE Edema  Neuro:eyes close, no EO to noxious, does not follow commands, pupils 2mm b/l, gaze midline, +corneals, + cough, localizes with L arm AG, no movement in R arm, withdraws L leg in plane of bed, TF R leg  NSCU Progress Note    Assessment/Hospital Course:    72F hx of HTN, DM who initially presented to [facility] 12/18 for worsening HA and Rt hemplegia LKW 230pm, [id number] NIHSS 24, CTH with L thalamic heme w/ IVH and HCP w/ extension into midbrain and terporoparietal lobe, intubated in ED for airway protecton and EVD placed, admitted to OSH MICU then on interval CTH with expansion of heme and ?spot sign on CTA, txer to [facility] for possible angio.      24 Hour Events/Subjective:  - EVD@15, no ICP issues  - high ICPs overnight, drain lowered to 15  - h/h stable      REVIEW OF SYSTEMS:  - negative except as above    VITALS:   - Reviewed      IMAGING/DATA:   - Reviewed          PHYSICAL EXAM:    General: ett to vent  CVS: RR  Pulm: CTAB. mechanical bs  GI: Soft, NTND  Extremities: No LE Edema  Neuro:eyes close, no EO to noxious, does not follow commands, pupils 2mm b/l, gaze midline, +corneals, + cough, localizes with L arm AG, no movement in R arm, withdraws L leg in plane of bed, TF R leg

## 2022-12-23 NOTE — PROVIDER CONTACT NOTE (CHANGE IN STATUS NOTIFICATION) - BACKGROUND
Pt with L thalamic heme with IVH and HCP with extension into midbrain and temporoparietal lobe  Intubated on PRVC. Off sedation since 0600
L thalamic heme w/ IVH and HCP w/ extension into midbrain and temporoparietal lobe

## 2022-12-24 NOTE — PROGRESS NOTE ADULT - SUBJECTIVE AND OBJECTIVE BOX
NSCU Progress Note    Assessment/Hospital Course:    72F hx of HTN, DM who initially presented to [facility] 12/18 for worsening HA and Rt hemplegia LKW 230pm, [id number] NIHSS 24, CTH with L thalamic heme w/ IVH and HCP w/ extension into midbrain and terporoparietal lobe, intubated in ED for airway protecton and EVD placed, admitted to OSH MICU then on interval CTH with expansion of heme and ?spot sign on CTA, txer to [facility] for possible angio.      24 Hour Events/Subjective:  - EVD@15, ICP 20-25  - h/h stable  - d1 CTX for UTI      REVIEW OF SYSTEMS:  - negative except as above    VITALS:   - Reviewed      IMAGING/DATA:   - Reviewed          PHYSICAL EXAM:    General: ett to vent  CVS: RR  Pulm: CTAB. mechanical bs  GI: Soft, NTND  Extremities: No LE Edema  Neuro:eyes close, EO to noxious, ?FC on LUE (2 fingers, squeezes hand), pupils 2mm b/l, gaze midline, +corneals, + cough, localizes with L arm AG, no movement in R arm, withdraws L leg in plane of bed, TF R leg

## 2022-12-24 NOTE — PROGRESS NOTE ADULT - ASSESSMENT
ASSESSMENT/PLAN:    72F hx of HTN, DM who initially presented to [facility] 12/18 for worsening HA and Rt hemplegia LKW 230pm, [id number] NIHSS 24, CTH with L thalamic heme w/ IVH and HCP w/ extension into midbrain and terporoparietal lobe, intubated in ED for airway protecton and EVD placed, admitted to OSH MICU then on interval CTH with expansion of heme and ?spot sign on CTA, txer to [facility] for possible angio.    NEURO:  s/p EVD soft pass 12/22  - neuro checks q2h  - no need for immediate angio per nsg  - failed EVD clamp trial 12/21 for high ICPs ~20min  - EVD@10, monitor ICPs  - pain control  - Central fevers: bromocriptine   - DNR, ongoing GOC    CVS:  - SBP goal   - losartan 100  - amlodipine 10  - Rt axillary Aliyah placed 12/18  - labetolol 100mg q8     PULM:  Intubated at OSH ED for airway protection  - ETT to vent  - CXR daily  - mucomyst, nebs  - ABG  - wean to extubate as tolerated  - CPAP    RENAL:  - Fluids: IVL  - daily IOs  - IOs daily  - Na 135-145  - BMPqdh    GI:  - Diet: TF at goal  - GI prophylaxis: PPI while intubated  - Bowel regimen: miralax, senna  - last BM 12/23    ENDO:   - FS goal 120-180  - synthroid  - NPH 14q6h     HEME/ONC:  s/p 1uprbc 12/21 for drop in h/h  - SCDs  - Chemoppx: SQL  - Trend h/h    ID:  - pancultured 12/23  - f/u cultures  MRSA neg 12/19  - CTX 12/24 x5d for UTI fu cultures    Dispo: ICU for mechanical ventillation, EVD

## 2022-12-24 NOTE — PROGRESS NOTE ADULT - SUBJECTIVE AND OBJECTIVE BOX
NSCU Progress Note    Assessment/Hospital Course:    72F hx of HTN, DM who initially presented to [facility] 12/18 for worsening HA and Rt hemplegia LKW 230pm, [id number] NIHSS 24, CTH with L thalamic heme w/ IVH and HCP w/ extension into midbrain and terporoparietal lobe, intubated in ED for airway protecton and EVD placed, admitted to OSH MICU then on interval CTH with expansion of heme and ?spot sign on CTA, txer to [facility] for possible angio.      24 Hour Events/Subjective:  - EVD@10, ICP 20-25  - h/h stable  - d1 CTX for UTI  made DNR today      REVIEW OF SYSTEMS:  - negative except as above    VITALS:   - Reviewed      IMAGING/DATA:   - Reviewed          PHYSICAL EXAM:    General: ett to vent  CVS: RR  Pulm: CTAB. mechanical bs  GI: Soft, NTND  Extremities: No LE Edema  Neuro:eyes close, EO to noxious, pupils 2mm b/l, gaze midline, +corneals, + cough, localizes with L arm AG, no movement in R arm, withdraws L leg in plane of bed, TF R leg  NSCU Progress Note    Assessment/Hospital Course:    72F hx of HTN, DM who initially presented to [facility] 12/18 for worsening HA and Rt hemplegia LKW 230pm, [id number] NIHSS 24, CTH with L thalamic heme w/ IVH and HCP w/ extension into midbrain and terporoparietal lobe, intubated in ED for airway protecton and EVD placed, admitted to OSH MICU then on interval CTH with expansion of heme and ?spot sign on CTA, txer to [facility] for possible angio.      24 Hour Events/Subjective:  - EVD@10, ICP 20-25  - h/h stable  - d1 CTX for UTI  made DNR today      REVIEW OF SYSTEMS:  - negative except as above    VITALS:   - Reviewed      IMAGING/DATA:   - Reviewed          PHYSICAL EXAM: w daughter speaking language Faroese    General: ett to vent  CVS: RR  Pulm: CTAB. mechanical bs  GI: Soft, NTND  Extremities: No LE Edema  Neuro:eyes close, EO to noxious, pupils 2mm b/l, gaze midline, +corneals, + cough, localizes with L arm AG,  R arm trace wd, spont L leg in plane of bed, R leg spont, appears to wiggle toes on command

## 2022-12-24 NOTE — PROVIDER CONTACT NOTE (OTHER) - ASSESSMENT
Pt. remains on full vent support, A&O x0, pupils sluggishly reactive. Withdraws to painful stimuli on bilateral lower extremities and L upper extremity. EVD at [id number], open and patent. Pt. febrile on cooling blanket. See assessment flowsheet for additional findings

## 2022-12-24 NOTE — PROGRESS NOTE ADULT - ASSESSMENT
ASSESSMENT/PLAN:    72F hx of HTN, DM who initially presented to [facility] 12/18 for worsening HA and Rt hemplegia LKW 230pm, [id number] NIHSS 24, CTH with L thalamic heme w/ IVH and HCP w/ extension into midbrain and terporoparietal lobe, intubated in ED for airway protecton and EVD placed, admitted to OSH MICU then on interval CTH with expansion of heme and ?spot sign on CTA, txer to [facility] for possible angio.    NEURO:  s/p EVD soft pass 12/22  - neuro checks q2h  - no need for immediate angio per nsg  - failed EVD clamp trial 12/21 for high ICPs ~20min  - EVD@ 15, monitor ICPs  - pain control  - Central fevers: bromocriptine   - sedation Precedex 0.4 for vent synchrony; hold for now restart prn    CVS:  - SBP goal   - losartan  - amlodipine 5  - Rt axillary Chaplin placed 12/18  - labetolol 100mg q8     PULM:  Intubated at OSH ED for airway protection  - ETT to vent  - CXR daily  - mucomyst, nebs  - ABG  - wean to extubate as tolerated  - CPAP    RENAL:  - Fluids: IVL  - daily IOs  - IOs daily  - Na 145-155  - BMPqdh    GI:  - Diet: TF at goal  - GI prophylaxis: PPI while intubated  - Bowel regimen: miralax, senna  - last BM 12/23     ENDO:   - FS goal 120-180  - synthroid  - NPH 14q6h     HEME/ONC:  s/p 1uprbc 12/21 for drop in h/h  - SCDs  - Chemoppx: SQL  - Trend h/h  - FOBT    ID:  - pancultured 12/23  - f/u cultures  - CTX 12/24 x5d for UTI    Dispo: ICU for mechanical ventillation, EVD    45 minutes of critical care time provided  ASSESSMENT/PLAN:    72F hx of HTN, DM who initially presented to [facility] 12/18 for worsening HA and Rt hemplegia LKW 230pm, [id number] NIHSS 24, CTH with L thalamic heme w/ IVH and HCP w/ extension into midbrain and terporoparietal lobe, intubated in ED for airway protecton and EVD placed, admitted to OSH MICU then on interval CTH with expansion of heme and ?spot sign on CTA, txer to [facility] for possible angio.    NEURO:  s/p EVD soft pass 12/22  - neuro checks q2h  - no need for immediate angio per nsg  - failed EVD clamp trial 12/21 for high ICPs ~20min  - EVD@15, monitor ICPs  - pain control  - Central fevers: bromocriptine   - DNR, ongoing GOC    CVS:  - SBP goal   - losartan  - amlodipine 5  - Rt axillary Aliyah placed 12/18  - labetolol 100mg q8     PULM:  Intubated at OSH ED for airway protection  - ETT to vent  - CXR daily  - mucomyst, nebs  - ABG  - wean to extubate as tolerated  - CPAP    RENAL:  - Fluids: IVL  - daily IOs  - IOs daily  - Na 145-155  - BMPqdh    GI:  - Diet: TF at goal  - GI prophylaxis: PPI while intubated  - Bowel regimen: miralax, senna  - last BM 12/23    ENDO:   - FS goal 120-180  - synthroid  - NPH 14q6h     HEME/ONC:  s/p 1uprbc 12/21 for drop in h/h  - SCDs  - Chemoppx: SQL  - Trend h/h    ID:  - pancultured 12/23  - f/u cultures  - CTX 12/24 x5d for UTI    Dispo: ICU for mechanical ventillation, EVD

## 2022-12-25 NOTE — PROVIDER CONTACT NOTE (OTHER) - ASSESSMENT
Pt. remains on fully vent support, withdraws to painful stimuli on all 4 extremities. Pt. receiving continuous tube feeding. Pt. hemodynamically stable, see assessment flowsheet for additional findings. Pt. remains on CPAP, withdraws to painful stimuli on all 4 extremities. Pt. receiving continuous tube feeding. Pt. hemodynamically stable, see assessment flowsheet for additional findings.

## 2022-12-25 NOTE — CONSULT NOTE ADULT - ASSESSMENT
Patient is a 72 year old female with a history of hypertension and diabetes that presented to [facility] ED with altered mental status.  Noted to be hypertensive to SBP 200s en route to hospital.  CT head showed left thalamic intraparenchymal hemorrhage.  Intubated for airway protection.  EVD placed.  Now transferred to [facility] for neurosurgical management.      NIHSS 20  ICH score 4 (19 Dec 2022 14:39)    Patient with poor exam and poor prognosis despite NS intervention and care.  We are asked to assist with GOC and possible PCU transfer if goal is for symptom directed management.

## 2022-12-25 NOTE — PROVIDER CONTACT NOTE (OTHER) - ASSESSMENT
Pt. remains on CPAP, moves to painful stimuli on all extremities. Pt. hemodynamically stable. Pt. receiving continuous tube feeds. See assessment flowsheet for additional findings

## 2022-12-25 NOTE — PROGRESS NOTE ADULT - SUBJECTIVE AND OBJECTIVE BOX
NSCU Progress Note    Assessment/Hospital Course:    72F hx of HTN, DM who initially presented to [facility] 12/18 for worsening HA and Rt hemplegia LKW 230pm, [id number] NIHSS 24, CTH with L thalamic heme w/ IVH and HCP w/ extension into midbrain and terporoparietal lobe, intubated in ED for airway protecton and EVD placed, admitted to OSH MICU then on interval CTH with expansion of heme and ?spot sign on CTA, txer to [facility] for possible angio.      24 Hour Events/Subjective:  - EVD@10, ICP 20-25  - h/h stable  - d1 CTX for UTI  made DNR       REVIEW OF SYSTEMS:  - negative except as above    VITALS:   - Reviewed      IMAGING/DATA:   - Reviewed      PHYSICAL EXAM: w daughter speaking language French    General: ett to vent  CVS: RR  Pulm: CTAB. mechanical bs  GI: Soft, NTND  Extremities: No LE Edema  Neuro:eyes close, EO to noxious, pupils 2mm b/l, gaze midline, +corneals, + cough, localizes with L arm AG,  R arm trace wd, spont L leg in plane of bed, R leg spont, appears to wiggle toes on command  NSCU Progress Note    Assessment/Hospital Course:    72F hx of HTN, DM who initially presented to [facility] 12/18 for worsening HA and Rt hemplegia LKW 230pm, [id number] NIHSS 24, CTH with L thalamic heme w/ IVH and HCP w/ extension into midbrain and terporoparietal lobe, intubated in ED for airway protecton and EVD placed, admitted to OSH MICU then on interval CTH with expansion of heme and ?spot sign on CTA, txer to [facility] for possible angio.      24 Hour Events/Subjective:  - EVD@10, ICP 20-25  - h/h stable  -  CTX for UTI  made DNR       REVIEW OF SYSTEMS:  - negative except as above    VITALS:   - Reviewed      IMAGING/DATA:   - Reviewed      PHYSICAL EXAM: w daughter speaking language Czech    General: ett to vent  CVS: RR  Pulm: CTAB. mechanical bs  GI: Soft, NTND  Extremities: No LE Edema  Neuro:eyes close, EO to noxious, pupils 2mm b/l, gaze midline, +corneals, + cough, localizes with L arm AG,  R arm trace wd, spont L leg in plane of bed, R leg spont, appears to wiggle toes on command

## 2022-12-25 NOTE — PROGRESS NOTE ADULT - SUBJECTIVE AND OBJECTIVE BOX
NSCU ATTENDING -- ADDITIONAL PROGRESS NOTE    Nighttime rounds were performed -- please refer to earlier Progress Note for HPI details.    T(C): 37.8 (12-25-22 @ 22:00), Max: 38.1 (12-25-22 @ 03:00)  HR: 75 (12-25-22 @ 22:09) (75 - 100)  BP: --  RR: 15 (12-25-22 @ 22:00) (10 - 20)  SpO2: 100% (12-25-22 @ 22:09) (100% - 100%)  Wt(kg): --    Relevant labwork and imaging reviewed.    CHIEF COMPLAINT: hemorrhage    exam unchanged.  EVD open, no ICP issues.  palliative following, family meeting tomorrow.

## 2022-12-25 NOTE — PROGRESS NOTE ADULT - ASSESSMENT
ASSESSMENT/PLAN:    72F hx of HTN, DM who initially presented to [facility] 12/18 for worsening HA and Rt hemplegia LKW 230pm, [id number] NIHSS 24, CTH with L thalamic heme w/ IVH and HCP w/ extension into midbrain and terporoparietal lobe, intubated in ED for airway protecton and EVD placed, admitted to OSH MICU then on interval CTH with expansion of heme and ?spot sign on CTA, txer to [facility] for possible angio.    NEURO:  s/p EVD soft pass 12/22  - neuro checks q2h  - no  angio per nsg  - failed EVD clamp trial 12/21 for high ICPs ~20min  - EVD@10, monitor ICPs  - pain control  - Central fevers: bromocriptine   - DNR, ongoing GOC    CVS:  - SBP goal   - losartan 100  - amlodipine 10  - Rt axillary Aliyah placed 12/18  - labetolol 100mg q8     PULM:  Intubated at OSH ED for airway protection  - ETT to vent  - CXR daily  - mucomyst, nebs  - ABG  - wean to extubate as tolerated  - CPAP    RENAL:  - Fluids: IVL  - daily IOs  - IOs daily  - Na 135-145  - BMPqdh    GI:  - Diet: TF at goal  - GI prophylaxis: PPI while intubated  - Bowel regimen: miralax, senna  - last BM 12/23    ENDO:   - FS goal 120-180  - synthroid  - NPH 14q6h     HEME/ONC:  s/p 1uprbc 12/21 for drop in h/h  - SCDs  - Chemoppx: SQL  - Trend h/h    ID:  - pancultured 12/23  - f/u cultures  MRSA neg 12/19  - CTX 12/24 x5d for UTI fu cultures    Dispo: ICU for mechanical ventillation, EVD   ASSESSMENT/PLAN:    72F hx of HTN, DM who initially presented to [facility] 12/18 for worsening HA and Rt hemplegia LKW 230pm, [id number] NIHSS 24, CTH with L thalamic heme w/ IVH and HCP w/ extension into midbrain and terporoparietal lobe, intubated in ED for airway protecton and EVD placed, admitted to OSH MICU then on interval CTH with expansion of heme and ?spot sign on CTA, txer to [facility] for possible angio.    NEURO:  s/p EVD soft pass 12/22  - neuro checks q2h  - no  angio per nsg  - failed EVD clamp trial 12/21 for high ICPs ~20min  - EVD@10, monitor ICPs  - pain control  - Central fevers: bromocriptine   - DNR, ongoing GOC    CVS:  - SBP goal   - losartan 100  - amlodipine 10  - Rt axillary Aliyah placed 12/19  - labetolol 100mg q8     PULM:  Intubated at OSH ED for airway protection  - ETT to vent  - CXR daily  - mucomyst, nebs  - ABG  - wean to extubate as tolerated  - CPAP    RENAL:  - Fluids: IVL  - daily IOs  - IOs daily  - Na 135-145  - BMPqdh    GI:  - Diet: TF at goal  - GI prophylaxis: PPI while intubated  - Bowel regimen: miralax, senna  - last BM 12/23    ENDO:   - FS goal 120-180  - synthroid  - NPH 14q6h     HEME/ONC:  s/p 1uprbc 12/21 for drop in h/h  - SCDs  - Chemoppx: SQL  - Trend h/h    ID:  - pancultured 12/23  - f/u cultures  MRSA neg 12/19  - CTX 12/24 x5d for UTI fu cultures    Dispo: ICU for mechanical ventillation, EVD

## 2022-12-26 NOTE — PROGRESS NOTE ADULT - SUBJECTIVE AND OBJECTIVE BOX
Interval events:    VITALS:  T(C): , Max: 38.3 (12-26-22 @ 10:00)  HR:  (75 - 106)  BP: --  ABP:  (94/51 - 177/76)  RR:  (12 - 20)  SpO2:  (100% - 100%)  Wt(kg): --  Device: Avea, Mode: CPAP with PS, FiO2: 30, PEEP: 5, PS: 10, MAP: 8, PIP: 15    12-25-22 @ 07:01  -  12-26-22 @ 07:00  --------------------------------------------------------  IN: 2430 mL / OUT: 1579 mL / NET: 851 mL    12-26-22 @ 07:01  -  12-26-22 @ 20:01  --------------------------------------------------------  IN: 810 mL / OUT: 768 mL / NET: 42 mL      LABS:  Na: 138 (12-25 @ 21:51), 134 (12-24 @ 23:14), 138 (12-23 @ 21:43)  K: 4.7 (12-25 @ 21:51), 3.7 (12-24 @ 23:14), 4.0 (12-23 @ 21:43)  Cl: 99 (12-25 @ 21:51), 102 (12-24 @ 23:14), 99 (12-23 @ 21:43)  CO2: 27 (12-25 @ 21:51), 21 (12-24 @ 23:14), 25 (12-23 @ 21:43)  BUN: 18 (12-25 @ 21:51), 16 (12-24 @ 23:14), 18 (12-23 @ 21:43)  Cr: 0.37 (12-25 @ 21:51), 0.68 (12-24 @ 23:14), 0.34 (12-23 @ 21:43)  Glu: 109(12-25 @ 21:51), 111(12-24 @ 23:14), 221(12-23 @ 21:43)    Hgb: 9.9 (12-25 @ 21:51), 11.7 (12-24 @ 23:14), 10.2 (12-23 @ 21:43)  Hct: 32.0 (12-25 @ 21:51), 34.9 (12-24 @ 23:14), 32.1 (12-23 @ 21:43)  WBC: 18.00 (12-25 @ 21:51), 9.89 (12-24 @ 23:14), 14.04 (12-23 @ 21:43)  Plt: 420 (12-25 @ 21:51), 480 (12-24 @ 23:14), 327 (12-23 @ 21:43)    MEDICATIONS:  acetaminophen     Tablet .. 650 milliGRAM(s) Oral every 6 hours PRN  amLODIPine   Tablet 10 milliGRAM(s) Oral daily  cefTRIAXone   IVPB 1000 milliGRAM(s) IV Intermittent every 24 hours  chlorhexidine 0.12% Liquid 15 milliLiter(s) Oral Mucosa every 12 hours  chlorhexidine 4% Liquid 1 Application(s) Topical <User Schedule>  dextrose 5%. 1000 milliLiter(s) IV Continuous <Continuous>  dextrose 5%. 1000 milliLiter(s) IV Continuous <Continuous>  dextrose 50% Injectable 25 Gram(s) IV Push once  dextrose 50% Injectable 12.5 Gram(s) IV Push once  dextrose 50% Injectable 25 Gram(s) IV Push once  dextrose Oral Gel 15 Gram(s) Oral once PRN  enoxaparin Injectable 40 milliGRAM(s) SubCutaneous every 24 hours  glucagon  Injectable 1 milliGRAM(s) IntraMuscular once  insulin lispro (ADMELOG) corrective regimen sliding scale   SubCutaneous every 6 hours  insulin NPH human recombinant 14 Unit(s) SubCutaneous every 6 hours  labetalol 100 milliGRAM(s) Oral every 8 hours  levothyroxine 25 MICROGram(s) Oral daily  losartan 100 milliGRAM(s) Enteral Tube daily  pantoprazole  Injectable 40 milliGRAM(s) IV Push daily  polyethylene glycol 3350 17 Gram(s) Oral every 12 hours  senna Syrup 10 milliLiter(s) Oral at bedtime    EXAMINATION:  General:  in NAD  HEENT:  with ET tube   Neuro:  eyes close, no EO to noxious, does not follow commands for examiner (but moves foot for the daughter witnessed by nurse), pupils 2mm b/l, gaze midline, +corneals, + cough, moves L arm spontaneously in the plane of bed, no movement in R arm, withdraws L leg in plane of bed, TF R leg   Cards:  RRR  Respiratory:  no respiratory distress  Abdomen:  soft  Extremities:  no LE edema    Assessment/Plan:   73 yo woman with HTN and DM, presented to 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 S/p EVD. CTA negative for vascular malformation. With subsequent expansion of ICH on next CTH. MRI brain without any acute findings.     c/w EVD at 10cm H2O  NPO for potential angiogram tomorrow   SBP goal 100-160  Na 145-155, on HTS at 30cc/hr  start TF until MN, NPO after MN, LBM prior to admission   consider starting DVT ppx tomorrow AM     R axillary [place]     Patient seen and examined by attending on 12/26/2022.    Patient is critically ill due to ICH with IVH and at high risk for neurological deterioration or death due to: potential expansion of ICH, midline shift of brain, inability to protect airway due to neurologic injury requiring mechanical ventilation.    Interval events:  Patient seen on evening rounds, no acute events.  CTH today stable.   Febrile to 38.8, last cultured yesterday.   On CPAP 10/5.    VITALS:  T(C): , Max: 38.3 (12-26-22 @ 10:00)  HR:  (75 - 106)  BP: --  ABP:  (94/51 - 177/76)  RR:  (12 - 20)  SpO2:  (100% - 100%)  Wt(kg): --  Device: Avea, Mode: CPAP with PS, FiO2: 30, PEEP: 5, PS: 10, MAP: 8, PIP: 15    12-25-22 @ 07:01  -  12-26-22 @ 07:00  --------------------------------------------------------  IN: 2430 mL / OUT: 1579 mL / NET: 851 mL    12-26-22 @ 07:01  -  12-26-22 @ 20:01  --------------------------------------------------------  IN: 810 mL / OUT: 768 mL / NET: 42 mL      LABS:  Na: 138 (12-25 @ 21:51), 134 (12-24 @ 23:14), 138 (12-23 @ 21:43)  K: 4.7 (12-25 @ 21:51), 3.7 (12-24 @ 23:14), 4.0 (12-23 @ 21:43)  Cl: 99 (12-25 @ 21:51), 102 (12-24 @ 23:14), 99 (12-23 @ 21:43)  CO2: 27 (12-25 @ 21:51), 21 (12-24 @ 23:14), 25 (12-23 @ 21:43)  BUN: 18 (12-25 @ 21:51), 16 (12-24 @ 23:14), 18 (12-23 @ 21:43)  Cr: 0.37 (12-25 @ 21:51), 0.68 (12-24 @ 23:14), 0.34 (12-23 @ 21:43)  Glu: 109(12-25 @ 21:51), 111(12-24 @ 23:14), 221(12-23 @ 21:43)    Hgb: 9.9 (12-25 @ 21:51), 11.7 (12-24 @ 23:14), 10.2 (12-23 @ 21:43)  Hct: 32.0 (12-25 @ 21:51), 34.9 (12-24 @ 23:14), 32.1 (12-23 @ 21:43)  WBC: 18.00 (12-25 @ 21:51), 9.89 (12-24 @ 23:14), 14.04 (12-23 @ 21:43)  Plt: 420 (12-25 @ 21:51), 480 (12-24 @ 23:14), 327 (12-23 @ 21:43)    MEDICATIONS:  acetaminophen     Tablet .. 650 milliGRAM(s) Oral every 6 hours PRN  amLODIPine   Tablet 10 milliGRAM(s) Oral daily  cefTRIAXone   IVPB 1000 milliGRAM(s) IV Intermittent every 24 hours  chlorhexidine 0.12% Liquid 15 milliLiter(s) Oral Mucosa every 12 hours  chlorhexidine 4% Liquid 1 Application(s) Topical <User Schedule>  dextrose 5%. 1000 milliLiter(s) IV Continuous <Continuous>  dextrose 5%. 1000 milliLiter(s) IV Continuous <Continuous>  dextrose 50% Injectable 25 Gram(s) IV Push once  dextrose 50% Injectable 12.5 Gram(s) IV Push once  dextrose 50% Injectable 25 Gram(s) IV Push once  dextrose Oral Gel 15 Gram(s) Oral once PRN  enoxaparin Injectable 40 milliGRAM(s) SubCutaneous every 24 hours  glucagon  Injectable 1 milliGRAM(s) IntraMuscular once  insulin lispro (ADMELOG) corrective regimen sliding scale   SubCutaneous every 6 hours  insulin NPH human recombinant 14 Unit(s) SubCutaneous every 6 hours  labetalol 100 milliGRAM(s) Oral every 8 hours  levothyroxine 25 MICROGram(s) Oral daily  losartan 100 milliGRAM(s) Enteral Tube daily  pantoprazole  Injectable 40 milliGRAM(s) IV Push daily  polyethylene glycol 3350 17 Gram(s) Oral every 12 hours  senna Syrup 10 milliLiter(s) Oral at bedtime    EXAMINATION:  General:  in NAD  HEENT:  with ET tube   Neuro:  eyes close, no EO to noxious, does not follow commands for examiner (but moves foot for the daughter witnessed by nurse), pupils 2mm b/l, gaze midline, +corneals, + cough, moves L arm spontaneously in the plane of bed, no movement in R arm, withdraws L leg in plane of bed, TF R leg   Cards:  RRR  Respiratory:  no respiratory distress  Abdomen:  soft  Extremities:  no LE edema    Assessment/Plan:   73 yo woman with HTN and DM, presented to [facility] 12/18 with HA and R hemiplegia, NIHSS 24, 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 S/p EVD. CTA negative for vascular malformation. With subsequent expansion of ICH on next CTH. MRI brain without any acute findings.     c/w EVD at 10cm H2O  SBP goal 100-160  on TF, LBM 12/25  PPI   ceftriaxone for UTI  Lov ppx     R axillary [place]     Patient seen and examined by attending on 12/26/2022.    Patient is critically ill due to ICH with IVH and at high risk for neurological deterioration or death due to: potential expansion of ICH, midline shift of brain, inability to protect airway due to neurologic injury requiring mechanical ventilation.    Interval events:  Patient seen on evening rounds, no acute events.  CTH today stable.   Febrile to 38.8, last cultured yesterday.   On CPAP 10/5.  EVD at 10cm H2O, output 107 for dayshift.   I updated the family at bedside this evening.     VITALS:  T(C): , Max: 38.3 (12-26-22 @ 10:00)  HR:  (75 - 106)  BP: --  ABP:  (94/51 - 177/76)  RR:  (12 - 20)  SpO2:  (100% - 100%)  Wt(kg): --  Device: Avea, Mode: CPAP with PS, FiO2: 30, PEEP: 5, PS: 10, MAP: 8, PIP: 15    12-25-22 @ 07:01  -  12-26-22 @ 07:00  --------------------------------------------------------  IN: 2430 mL / OUT: 1579 mL / NET: 851 mL    12-26-22 @ 07:01  -  12-26-22 @ 20:01  --------------------------------------------------------  IN: 810 mL / OUT: 768 mL / NET: 42 mL      LABS:  Na: 138 (12-25 @ 21:51), 134 (12-24 @ 23:14), 138 (12-23 @ 21:43)  K: 4.7 (12-25 @ 21:51), 3.7 (12-24 @ 23:14), 4.0 (12-23 @ 21:43)  Cl: 99 (12-25 @ 21:51), 102 (12-24 @ 23:14), 99 (12-23 @ 21:43)  CO2: 27 (12-25 @ 21:51), 21 (12-24 @ 23:14), 25 (12-23 @ 21:43)  BUN: 18 (12-25 @ 21:51), 16 (12-24 @ 23:14), 18 (12-23 @ 21:43)  Cr: 0.37 (12-25 @ 21:51), 0.68 (12-24 @ 23:14), 0.34 (12-23 @ 21:43)  Glu: 109(12-25 @ 21:51), 111(12-24 @ 23:14), 221(12-23 @ 21:43)    Hgb: 9.9 (12-25 @ 21:51), 11.7 (12-24 @ 23:14), 10.2 (12-23 @ 21:43)  Hct: 32.0 (12-25 @ 21:51), 34.9 (12-24 @ 23:14), 32.1 (12-23 @ 21:43)  WBC: 18.00 (12-25 @ 21:51), 9.89 (12-24 @ 23:14), 14.04 (12-23 @ 21:43)  Plt: 420 (12-25 @ 21:51), 480 (12-24 @ 23:14), 327 (12-23 @ 21:43)    MEDICATIONS:  acetaminophen     Tablet .. 650 milliGRAM(s) Oral every 6 hours PRN  amLODIPine   Tablet 10 milliGRAM(s) Oral daily  cefTRIAXone   IVPB 1000 milliGRAM(s) IV Intermittent every 24 hours  chlorhexidine 0.12% Liquid 15 milliLiter(s) Oral Mucosa every 12 hours  chlorhexidine 4% Liquid 1 Application(s) Topical <User Schedule>  dextrose 5%. 1000 milliLiter(s) IV Continuous <Continuous>  dextrose 5%. 1000 milliLiter(s) IV Continuous <Continuous>  dextrose 50% Injectable 25 Gram(s) IV Push once  dextrose 50% Injectable 12.5 Gram(s) IV Push once  dextrose 50% Injectable 25 Gram(s) IV Push once  dextrose Oral Gel 15 Gram(s) Oral once PRN  enoxaparin Injectable 40 milliGRAM(s) SubCutaneous every 24 hours  glucagon  Injectable 1 milliGRAM(s) IntraMuscular once  insulin lispro (ADMELOG) corrective regimen sliding scale   SubCutaneous every 6 hours  insulin NPH human recombinant 14 Unit(s) SubCutaneous every 6 hours  labetalol 100 milliGRAM(s) Oral every 8 hours  levothyroxine 25 MICROGram(s) Oral daily  losartan 100 milliGRAM(s) Enteral Tube daily  pantoprazole  Injectable 40 milliGRAM(s) IV Push daily  polyethylene glycol 3350 17 Gram(s) Oral every 12 hours  senna Syrup 10 milliLiter(s) Oral at bedtime    EXAMINATION:  General:  in NAD  HEENT:  with ET tube   Neuro:  eyes open, does not attend, does not follow commands, BTT on the L, + cough, moves L arm spontaneously in the plane of bed, R arm flexes to noxious, withdraws L leg in plane of bed, TF R leg   Cards:  RRR  Respiratory:  no respiratory distress  Abdomen:  soft  Extremities:  no LE edema    Assessment/Plan:   73 yo woman with HTN and DM, presented to [facility] 12/18 with HA and R hemiplegia, NIHSS 24, 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 S/p EVD. CTA negative for vascular malformation. With subsequent expansion of ICH on next CTH. MRI brain without any acute findings.     c/w EVD at 10cm H2O  SBP goal 100-160  on TF, LBM 12/25  PPI   ceftriaxone for UTI  Lov ppx     Patient seen and examined by attending on 12/26/2022.    Patient is critically ill due to ICH with IVH and at high risk for neurological deterioration or death due to: potential expansion of ICH, hydrocephalus requiring an EVD for CSF diversion, inability to protect airway due to neurologic injury requiring mechanical ventilation.

## 2022-12-26 NOTE — PROGRESS NOTE ADULT - SUBJECTIVE AND OBJECTIVE BOX
NSCU Progress Note    Assessment/Hospital Course:    72F hx of HTN, DM who initially presented to [facility] 12/18 for worsening HA and Rt hemplegia LKW 230pm, [id number] NIHSS 24, CTH with L thalamic heme w/ IVH and HCP w/ extension into midbrain and terporoparietal lobe, intubated in ED for airway protecton and EVD placed, admitted to OSH MICU then on interval CTH with expansion of heme and ?spot sign on CTA, txer to [facility] for possible angio.      24 Hour Events/Subjective:  - EVD@10, ICP 20-25  - h/h stable  - d3 CTX for UTI  - ongoing goc      REVIEW OF SYSTEMS:  - negative except as above    VITALS:   - Reviewed      IMAGING/DATA:   - Reviewed          PHYSICAL EXAM:    General: ett to vent  CVS: RR  Pulm: CTAB. mechanical bs  GI: Soft, NTND  Extremities: No LE Edema  Neuro: EO to noxious, ?FC on LUE (2 fingers, squeezes hand), pupils 2mm b/l, gaze midline, +corneals, + cough, localizes with L arm AG, no movement in R arm, withdraws L leg in plane of bed, TF R leg

## 2022-12-26 NOTE — PROGRESS NOTE ADULT - ASSESSMENT
ASSESSMENT/PLAN:    72F hx of HTN, DM who initially presented to [facility] 12/18 for worsening HA and Rt hemplegia LKW 230pm, [id number] NIHSS 24, CTH with L thalamic heme w/ IVH and HCP w/ extension into midbrain and terporoparietal lobe, intubated in ED for airway protecton and EVD placed, admitted to OSH MICU then on interval CTH with expansion of heme and ?spot sign on CTA, txer to [facility] for possible angio.    NEURO:  s/p EVD soft pass 12/22  - neuro checks q2h  - no angio per nsg  - CTH today relatively unchanged  - failed EVD clamp trial 12/21 for high ICPs ~20min  - EVD@10, monitor ICPs  - pain control  - Central fevers: bromocriptine   - DNR, ongoing GOC, palliative following    CVS:  - SBP goal   - losartan 100  - amlodipine 10  - Rt axillary [place] placed 12/19  - labetolol 100mg q8     PULM:  Intubated at OSH ED for airway protection  - ETT to vent  - CXR daily  - mucomyst, nebs  - ABG  - CPAP    RENAL:  - Fluids: IVL  - daily IOs  - IOs daily  - Na 135-145  - BMPqdh    GI:  - Diet: TF at goal  - GI prophylaxis: PPI while intubated  - Bowel regimen: miralax, senna  - last BM 12/25    ENDO:   - FS goal 120-180  - synthroid  - NPH 14q6h     HEME/ONC:  s/p 1uprbc 12/21 for drop in h/h  - SCDs  - Chemoppx: SQL  - Trend h/h    ID:  - pancultured 12/23  - f/u cultures  - MRSA neg 12/19  - CTX 12/24 x5d for UTI fu cultures    Dispo: ICU for mechanical ventillation, EVD

## 2022-12-27 NOTE — CONSULT NOTE ADULT - SUBJECTIVE AND OBJECTIVE BOX
HPI:   Patient is a 72y female with a past history of HTN and  DM who was admitted to [facility] on  with headache, confusion and noted to have BP of 200 with CT scan showing ICH. She was intubated for airway protection and transferred to the NSICU. She required placement of EVD on  , has remained on vent, has been managed conservatively.She has been febrile since admission, received CTX - and was noted today to have ESBL E Coli isolated from her urine.  She has been poorly responsive on vent, palliative care has been involved, and goals of care discussions have been ongoing.    REVIEW OF SYSTEMS:  All other review of systems negative (Comprehensive ROS)    PAST MEDICAL & SURGICAL HISTORY:  HTN (hypertension)      DM2 (diabetes mellitus, type 2)      No significant past surgical history          Allergies    No Known Allergies    Intolerances        Antimicrobials Day #  :    Other Medications:  acetaminophen     Tablet .. 650 milliGRAM(s) Oral every 6 hours PRN  amLODIPine   Tablet 10 milliGRAM(s) Oral daily  chlorhexidine 0.12% Liquid 15 milliLiter(s) Oral Mucosa every 12 hours  chlorhexidine 4% Liquid 1 Application(s) Topical <User Schedule>  dextrose 5%. 1000 milliLiter(s) IV Continuous <Continuous>  dextrose 5%. 1000 milliLiter(s) IV Continuous <Continuous>  dextrose 50% Injectable 25 Gram(s) IV Push once  dextrose 50% Injectable 12.5 Gram(s) IV Push once  dextrose 50% Injectable 25 Gram(s) IV Push once  dextrose Oral Gel 15 Gram(s) Oral once PRN  enoxaparin Injectable 40 milliGRAM(s) SubCutaneous every 24 hours  glucagon  Injectable 1 milliGRAM(s) IntraMuscular once  insulin lispro (ADMELOG) corrective regimen sliding scale   SubCutaneous every 6 hours  insulin NPH human recombinant 18 Unit(s) SubCutaneous every 6 hours  labetalol 100 milliGRAM(s) Oral every 8 hours  levothyroxine 25 MICROGram(s) Oral daily  losartan 100 milliGRAM(s) Enteral Tube daily  pantoprazole  Injectable 40 milliGRAM(s) IV Push daily  polyethylene glycol 3350 17 Gram(s) Oral every 12 hours  senna Syrup 10 milliLiter(s) Oral at bedtime      FAMILY HISTORY:  No pertinent family history in first degree relatives        SOCIAL HISTORY:  Smoking:  x   ETOH: x    Drug Use: x      T(F): 98.8 (22 @ 09:00), Max: 102 (22 @ 05:00)  HR: 93 (22 @ 09:28)  BP: --  RR: 13 (22 @ 09:00)  SpO2: 100% (22 @ 09:28)  Wt(kg): --    PHYSICAL EXAM:  General: sedated on vent. EVD in place  Eyes:  anicteric, no conjunctival injection, no discharge  Oropharynx ETT,OGT	  Neck: supple, without adenopathy  Lungs: clear to auscultation  Heart: regular rate and rhythm; no murmur, rubs or gallops  Abdomen: soft, nondistended, nontender, without mass or organomegaly  Skin: no rash  Extremities: no clubbing, cyanosis,+ edema  Neurologic: poorly interactive on vent    LAB RESULTS:                        8.6    14.59 )-----------( 433      ( 27 Dec 2022 00:58 )             27.4         138  |  98  |  23  ----------------------------<  171<H>  3.8   |  29  |  0.48<L>    Ca    8.7      27 Dec 2022 00:58  Phos  4.2       Mg     2.3             Urinalysis Basic - ( 26 Dec 2022 08:18 )    Color: Yellow / Appearance: Clear / S.017 / pH: x  Gluc: x / Ketone: Negative  / Bili: Negative / Urobili: 8 mg/dL   Blood: x / Protein: Trace / Nitrite: Negative   Leuk Esterase: Negative / RBC: 40 /hpf / WBC 1 /HPF   Sq Epi: x / Non Sq Epi: 1 /hpf / Bacteria: Negative        MICROBIOLOGY:  RECENT CULTURES:   @ 06:30 .Blood Blood     No growth to date.       @ 05:47 .Blood Blood     No growth to date.       @ 05:15 Combi-Cath Combi-Cath     Normal Respiratory Jojo present    Rare Squamous epithelial cells per low power field  Few polymorphonuclear leukocytes per low power field  Rare Gram Positive Cocci in Pairs and Chains per oil power field     @ 05:14 Catheterized Catheterized Escherichia coli ESBL    >100,000 CFU/ml Escherichia coli ESBL       @ 21:45 .Blood Blood     No growth to date.            RADIOLOGY REVIEWED:  < from: CT Head No Cont (22 @ 09:22) >  INTERPRETATION:  CLINICAL INDICATION: Follow-up left basal ganglia   hemorrhage    5mm axial sections of the brain were obtained from base to vertex,   without the intravenous administration of contrast material. Images were   obtained on a portable CT scanner and compared with 2022.    There is a large left basal ganglia hemorrhage measuring approximately   4.8 cm with mass effect on the left lateral ventricle and   intraventricular hemorrhage. There is moderate midline shift to the right   which is unchanged since the prior exam. A right frontal ventricular   catheter has its tip in the body of the right lateral ventricle. There is   no change since the prior exam.      IMPRESSION: No change since 2022. Large left basal ganglia   hemorrhage with intraventricular extension, mass effect and midline shift   to the right, unchanged since 2022.    < end of copied text >  < from: Xray Chest 1 View- PORTABLE-Routine (Xray Chest 1 View- PORTABLE-Routine in AM.) (22 @ 08:06) >  FINDINGS:  Endotracheal tube with tip in the mid trachea, enteric tube with tip   below the field of view, unchanged.  The lungs are clear.  There is no pleural effusion or pneumothorax.  The heart is normal in size  The visualized osseous structures demonstrate no acute pathology.    IMPRESSION:  No acute interval change.    < end of copied text >  
HPI:  Patient is a 72 year old female with a history of hypertension and diabetes that presented to [facility] ED with altered mental status.  Noted to be hypertensive to SBP 200s en route to hospital.  CT head showed left thalamic intraparenchymal hemorrhage.  Intubated for airway protection.  EVD placed.  Now transferred to [facility] for neurosurgical management.      NIHSS 20  ICH score 4 (19 Dec 2022 14:39)    Patient with poor exam and poor prognosis despite NS intervention and care.  We are asked to assist with GOC and possible PCU transfer if goal is for symptom directed management.    PERTINENT PM/SXH:   HTN (hypertension)    DM2 (diabetes mellitus, type 2)      No significant past surgical history      FAMILY HISTORY:  No pertinent family history in first degree relatives      Family Hx substance abuse [ ]yes [x ]no  ITEMS NOT CHECKED ARE NOT PRESENT    SOCIAL HISTORY:   Significant other/partner[ ]   Children[x ]  Shinto/Spirituality: , no Spiritism listed  Substance hx:  [ ]   Tobacco hx:  [ ]   Alcohol hx: [ ]   Home Opioid hx:  [ ] I-Stop Reference No:  Living Situation: [x ]Home with nephew in private house  [ ]Long term care  [ ]Rehab [ ]Other    ADVANCE DIRECTIVES:    DNR/MOLST  [ ]  Living Will  [ ]   DECISION MAKER(s): daughters  [ ] Health Care Proxy(s)  [ ] Surrogate(s)  [ ] Guardian           Name(s): Phone Number(s):    BASELINE (I)ADL(s) (prior to admission):  [place]: [x ]Total  [ ] Moderate [ ]Dependent    Allergies    No Known Allergies    Intolerances    MEDICATIONS  (STANDING):  amLODIPine   Tablet 10 milliGRAM(s) Oral daily  cefTRIAXone   IVPB 1000 milliGRAM(s) IV Intermittent every 24 hours  chlorhexidine 0.12% Liquid 15 milliLiter(s) Oral Mucosa every 12 hours  chlorhexidine 4% Liquid 1 Application(s) Topical <User Schedule>  dextrose 5%. 1000 milliLiter(s) (100 mL/Hr) IV Continuous <Continuous>  dextrose 5%. 1000 milliLiter(s) (50 mL/Hr) IV Continuous <Continuous>  dextrose 50% Injectable 25 Gram(s) IV Push once  dextrose 50% Injectable 12.5 Gram(s) IV Push once  dextrose 50% Injectable 25 Gram(s) IV Push once  enoxaparin Injectable 40 milliGRAM(s) SubCutaneous every 24 hours  glucagon  Injectable 1 milliGRAM(s) IntraMuscular once  insulin lispro (ADMELOG) corrective regimen sliding scale   SubCutaneous every 6 hours  insulin NPH human recombinant 14 Unit(s) SubCutaneous every 6 hours  labetalol 100 milliGRAM(s) Oral every 8 hours  levothyroxine 25 MICROGram(s) Oral daily  losartan 100 milliGRAM(s) Enteral Tube daily  pantoprazole  Injectable 40 milliGRAM(s) IV Push daily  polyethylene glycol 3350 17 Gram(s) Oral every 12 hours  potassium chloride   Solution 40 milliEquivalent(s) Enteral Tube every 2 hours  senna Syrup 10 milliLiter(s) Oral at bedtime    MEDICATIONS  (PRN):  acetaminophen     Tablet .. 650 milliGRAM(s) Oral every 6 hours PRN Temp greater or equal to 38C (100.4F)  dextrose Oral Gel 15 Gram(s) Oral once PRN Blood Glucose LESS THAN 70 milliGRAM(s)/deciliter    PRESENT SYMPTOMS: x]Unable to self-report  [x ] CPOT [ ] PAINADs x[ ] RDOS  Source if other than patient:  [ ]Family   [ ]Team     Pain: [ ]yes [x ]no  QOL impact -   Location -                    Aggravating factors -  Quality -  Radiation -  Timing-  Severity (0-10 scale):  Minimal acceptable level (0-10 scale):       Dyspnea:                           [ ]Mild [ ]Moderate [ ]Severe  Anxiety:                             [ ]Mild [ ]Moderate [ ]Severe  Fatigue:                             [ ]Mild [ ]Moderate [ ]Severe  Nausea:                             [ ]Mild [ ]Moderate [ ]Severe  Loss of appetite:              [ ]Mild [ ]Moderate [ ]Severe  Constipation:                    [ ]Mild [ ]Moderate [ ]Severe    PCSSQ [Palliative Care Spiritual Screening Question]   Severity (0-10):  Score of 4 or > indicate consideration of Chaplaincy referral.  Chaplaincy Referral: [ ] yes [ ] refused [ ] following    Caregiver [place]? : [ ] yes [ ] no [ ] deferred:  Social work referral [ ] Patient & Family Centered Care Referral [ ]     Anticipatory Grief Present?: [ ] yes [ ] no  [ ] deferred: Social work referral [ ]  Patient & Family Centered Care Referral [ ]       Other Symptoms:  [ ]All other review of systems negative     Palliative Performance Status Version 2:   10      %    http://npcrc.org/files/news/palliative_performance_scale_ppsv2.pdf  PHYSICAL EXAM:  Vital Signs Last 24 Hrs  T(C): 38 (25 Dec 2022 12:00), Max: 38.1 (25 Dec 2022 03:00)  T(F): 100.4 (25 Dec 2022 12:00), Max: 100.6 (25 Dec 2022 03:00)  HR: 93 (25 Dec 2022 12:00) (75 - 93)  BP: --  BP(mean): --  RR: 15 (25 Dec 2022 12:00) (10 - 16)  SpO2: 100% (25 Dec 2022 12:) (100% - 100%)    Parameters below as of 25 Dec 2022 07:00  Patient On (Oxygen Delivery Method): ventilator,CPAP 10/5    O2 Concentration (%): 30 I&O's Summary    24 Dec 2022 07:01  -  25 Dec 2022 07:00  --------------------------------------------------------  IN: 1480 mL / OUT: 1419 mL / NET: 61 mL    25 Dec 2022 07:01  -  25 Dec 2022 13:03  --------------------------------------------------------  IN: 425 mL / OUT: 49 mL / NET: 376 mL      GENERAL: [ ]Cachexia    [ ]Alert  [ ]Oriented x   [ ]Lethargic  [x ]Unarousable  [ ]Verbal  [x ]Non-Verbal  Behavioral:   [ ] Anxiety  [ ] Delirium [ ] Agitation [ ] Other  HEENT:  [ ]Normal   [ ]Dry mouth   [x ]ET Tube/Trach  [ ]Oral lesions  PULMONARY:   [x ]Clear [ ]Tachypnea  [ ]Audible excessive secretions   [ ]Rhonchi        [ ]Right [ ]Left [ ]Bilateral  [ ]Crackles        [ ]Right [ ]Left [ ]Bilateral  [ ]Wheezing     [ ]Right [ ]Left [ ]Bilateral  [ ]Diminished breath sounds [ ]right [ ]left [ ]bilateral  CARDIOVASCULAR:    [x ]Regular [ ]Irregular [ ]Tachy  [ ]Axel [ ]Murmur [ ]Other  GASTROINTESTINAL:  [x ]Soft  [ ]Distended   [x ]+BS  [x ]Non tender [ ]Tender  [ ]Other [ ]PEG [ ]OGT/ NGT  Last BM:  GENITOURINARY:  [ ]Normal x[ ] Incontinent   [ ]Oliguria/Anuria   [x ]Li  MUSCULOSKELETAL:   [ ]Normal   [ ]Weakness  [x ]Bed/Wheelchair bound [ ]Edema  NEUROLOGIC:   [ ]No focal deficits  [ x]Cognitive impairment  [ ]Dysphagia [ ]Dysarthria [ ]Paresis [ ]Other   SKIN:   [ x]Normal  [ ]Rash  [ ]Other  [ ]Pressure ulcer(s)       Present on admission [ ]y [ ]n    CRITICAL CARE:  [ ] Shock Present  [ ]Septic [ ]Cardiogenic [ ]Neurologic [ ]Hypovolemic  [ ]  Vasopressors [ ]  Inotropes   [x ]Respiratory failure present [ ]Mechanical ventilation [ ]Non-invasive ventilatory support [ ]High flow  Mode: CPAP with PS, FiO2: 30, PEEP: 5, PS: 5, ITime: 1, MAP: 8, PIP: 13  [ ]Acute  [ ]Chronic [ ]Hypoxic  [ ]Hypercarbic [ ]Other  [x ]Other organ failure  brain    LABS:                        11.7   9.89  )-----------( 480      ( 24 Dec 2022 23:14 )             34.9   12-24    134<L>  |  102  |  16  ----------------------------<  111<H>  3.7   |  21<L>  |  0.68    Ca    8.9      24 Dec 2022 23:14  Phos  3.1     12-24  Mg     1.9     12-24        Urinalysis Basic - ( 23 Dec 2022 23:42 )    Color: Yellow / Appearance: Slightly Turbid / S.023 / pH: x  Gluc: x / Ketone: Trace  / Bili: Negative / Urobili: Negative   Blood: x / Protein: 30 mg/dL / Nitrite: Negative   Leuk Esterase: Large / RBC: 318 /hpf /  /HPF   Sq Epi: x / Non Sq Epi: 1 /hpf / Bacteria: Few      RADIOLOGY & ADDITIONAL STUDIES:  < from: Xray Chest 1 View- PORTABLE-Routine (Xray Chest 1 View- PORTABLE-Routine in AM.) (22 @ 04:55) >    ACC: [id number] EXAM:  XR CHEST PORTABLE ROUTINE 1V                          PROCEDURE DATE:  2022          INTERPRETATION:  CLINICAL INDICATION: Patient with left basal ganglia   intracranial hemorrhage, intubated    EXAM: Frontal radiograph of the chest.    COMPARISON: Chest radiograph from 2022    FINDINGS:  Endotracheal tube tip is above the level of the james and enteric tube   with tip below the filed of view, unchanged.  The lungs are clear.  There is no pleural effusion or pneumothorax.  The heart is normal in size  The visualized osseous structures demonstrate no acute pathology.    IMPRESSION:  External tubes and lines described as above.  Clear lungs.    --- End of Report ---           AILEEN CANALES MD; Resident Radiologist  This document has been electronically signed.  JOSE SORTO MD; Attending Radiologist  This document has been electronically signed. Dec 25 2022 11:28AM    < end of copied text >  < from: CT Head No Cont (22 @ 18:19) >    ACC: [id number] EXAM:  CT BRAIN                          PROCEDURE DATE:  2022          INTERPRETATION:  INDICATIONS:  New EVD    TECHNIQUE:  Serial axial images were obtained from the skull base to the   vertex without intravenous contrast using portable technique.    COMPARISON EXAMINATION: 2022    FINDINGS:  VENTRICLES AND SULCI: Mass effect on the left lateral ventricle with   midline shift to the right. Midline shift roughly 1 cm. Increased mass   effect and shift compared with the prior suspected. Intraventricular   blood in the bilateral lateral ventricles as well as the third ventricle   slightly increased compared with the prior may relate to the new shunt   placement. The ventricles themselves appear decreased in size. EVD with   its tip in close proximity to the right foramen of Sharri/third   ventricular region  INTRA-AXIAL:  Left hemispheric parenchymal hemorrhage measuring 3.2 AP x   4.6 TR CM similar in appearance compared with the prior. Hemorrhage   surrounds the ventricular catheter in the right frontal lobe. Edema is   seen in this region as well.  EXTRA-AXIAL:  No mass or collection is seen.  VISUALIZED SINUSES: Bilateral maxillary sinus polyps versus retention   cysts  VISUALIZED MASTOIDS:  Clear.  CALVARIUM:  Normal.  MISCELLANEOUS:  Patient is intubated. An NG tube is visualized.    IMPRESSION:  Status post new EVD. The ventricles appear decreased in size compared   with prior. There does appear to be slightly increased intraventricular   hemorrhage which may be on the basis of the EVD placement. Mass effect on   the left lateral ventricle with midline shift to the right of roughly 1   cm which appears increased compared with the prior but may be related to   head positioning in the portable CT scanner.    --- End of Report ---            TAHIR SINGH MD; Attending Radiologist  This document has been electronically signed. Dec 23 2022  6:24PM    < end of copied text >      PROTEIN CALORIE MALNUTRITION PRESENT: [ ]mild [ ]moderate [ ]severe [ ]underweight [ ]morbid obesity  https://www.andeal.org/vault/2440/web/files/ONC/Table_Clinical%20Characteristics%20to%20Document%20Malnutrition-White%20JV%20et%20al%2020.pdf    Height (cm): 162.6 (22 @ 13:57)  Weight (kg): 53.2 (22 @ 13:57), 53.2 (22 @ 19:30)  BMI (kg/m2): 20.1 (22 @ 13:57)    [ ]PPSV2 < or = to 30% [ ]significant weight loss  [ ]poor nutritional intake  [ ]anasarca[ ]Artificial Nutrition      Other REFERRALS:  [ ]Hospice  [ ]Child Life  [ ]Social Work  [ ]Case management [ ]Holistic Therapy                                 Care Coordination Assessment 201    COGNITIVE/LEARNING 201  Mental Status    Answers: Unable to assess    ADMISSION HISTORY 201  Admitted From    Answers: Acute Hospital    FINANCIAL 201  Does patient have financial concerns for discharge?    Answers: None    LIVING ARRANGEMENTS/SUPPORT 201  Housing Environment    Answers: House    CAREGIVER CONTACT 201  Does the patient wish to identify a Caregiver?    Answers: Unable to assess    EMERGENCY CONTACTS OUTSIDE HOME 201  Emergency Contact    Answers: Emergency Contact Name Enedelia Pemberton,  Answers: Emergency Contact Phone # [phone],  Answers: Emergency Contact Relationship Daughter    DISCHARGE PLANNING 201  Potential Discharge Plan and Services    Answers: Anticipated Needs Unclear at Present    SCREENING 201  Social Work Screen and Referral    Answers: Adjustment to Illness/Difficulty Coping,  Answers: Major Illness Causing Lifestyles Changes    SUMMARY 201  Initial Clinical Summary    Notes: Social Work was referred to patient on NSCU for for psychosocial and  needs assessment. Medical chart was reviewed. As per chart patient is a 72 year  old female with a history of hypertension and diabetes that presented to [facility] ED  with altered mental status. Noted to be hypertensive to SBP 200s en route to  hospital.EVD placed and patient was transferred to [facility]. Patient is unable to  participate in the assessment due to acuity of illness. LMSW met with patient's  daughter Enedelia Pemberton [phone], at bedside to obtain information. LMSW  introduced self and role. Daughter verbalized understanding and agreed to  provide information.   Patient lives with nephew Ashlee Wood in a private home with 2-3 steps  to enter. Patient unable to identify acaregiver at this time. Patient's  daughter Enedelia is listed as the emergency contact. Patient with 2 daughters  Enedelia and Akhil Pemberton, are identified as the surrogate decision makers.  Patient is Full Code.   Prior to admission patient was independent with ADLs and ambulation. No  assistive device or homecare services.     Patient's PCP is Dr. Hernandez    Patient's pharmacy is [facility]    Patient with [facility] Medicare insurance.    Anticipated Discharge Plan and Services    Notes: Anticipated needs are unclear at this time. Social Work will continue to  be available.       Electronically signed by:  Cynthia Abreu LMSW  Electronically signed on:  2022  14:48

## 2022-12-27 NOTE — CHART NOTE - NSCHARTNOTEFT_GEN_A_CORE
Nutrition Follow Up Note  Patient seen for: Nutrition Follow Up     Chart reviewed, events noted. Pt is a 73 yo F with PMH: HTN, DM. Initially presented to [facility]  for worsening HA and R hemiplegia. CTH with L thalamic heme with IVH and HCP with extension into midbrain and terporoparietal lobe. Intubated for airway protection and EVD placed. Transferred to NSCU for possible angio in setting of interval CTH with expansion of heme. MRI of brain without any acute findings. S/P EVD soft pass . Failed EVD clamp trial . Remains intubated at this time. ID consulted for ESBL E coli in urine. Ongoing goals of care discussions noted.     Source: [] Patient       [x] EMR        [x] RN        [] Family at bedside       [x] Other: interdisciplinary medical team    -If unable to interview patient: [x] Trach/Vent/BiPAP  [x] Disoriented/confused/inappropriate to interview    Diet Order:   Diet, NPO with Tube Feed:   Tube Feeding Modality: Nasogastric  Glucerna 1.2 Fede (GLUCERNARTH)  Total Volume for 24 Hours (mL): 1440  Continuous  Starting Tube Feed Rate {mL per Hour}: 20  Increase Tube Feed Rate by (mL): 10     Every 4 hours  Until Goal Tube Feed Rate (mL per Hour): 60  Tube Feed Duration (in Hours): 24  Tube Feed Start Time: 21:00 (22)    EN Order Provides: 1440ml, 1728kcal and 86g protein     EN Provision (per nursing flow sheet):   (): feeds infusing at 60ml/hr  (): 100% of goal  (): 100% of goal  (): 60% of goal  (): 100% of goal    Nutrition-related concerns:  -Last BM (): x 2; (): x 2. On bowel regimen (senna, Miralax).   -Continues on PO synthroid as prescribed. See below for updated EN recommendations.   -Continues on antihyperglycemic regimen: NPH 18u q 6 hrs, insulin lispro sliding scale to aid in management of BG. A1c 6.8%.   -Na goal 135-145 (determined by neurosurgical team). EVD remains in place.   -Team continues to trend electrolytes and replete PRN.     Weights:   Daily Weight in k.2 (12-21)  Current wt pending.     MEDICATIONS  (STANDING):  amLODIPine   Tablet  dextrose 5%.  dextrose 5%.  dextrose 50% Injectable  dextrose 50% Injectable  dextrose 50% Injectable  glucagon  Injectable  insulin lispro (ADMELOG) corrective regimen sliding scale  insulin NPH human recombinant  labetalol  levothyroxine  losartan  pantoprazole  Injectable  polyethylene glycol 3350  senna Syrup    Pertinent Labs:  @ 00:58: Na 138, BUN 23, Cr 0.48<L>, <H>, K+ 3.8, Phos 4.2, Mg 2.3, Alk Phos --, ALT/SGPT --, AST/SGOT --, HbA1c --    A1C with Estimated Average Glucose Result: 6.8 % (22 @ 03:01)  A1C with Estimated Average Glucose Result: 6.7 % (22 @ 04:15)    Finger Sticks:  POCT Blood Glucose.: 171 mg/dL ( @ 11:16)  POCT Blood Glucose.: 213 mg/dL ( @ 05:16)  POCT Blood Glucose.: 178 mg/dL ( @ 00:43)  POCT Blood Glucose.: 183 mg/dL ( @ 17:51)    Triglycerides, Serum: 206 mg/dL (22 @ 16:58)    Skin per nursing documentation: surgical incision R EVD  Edema: 1+ right arm    (based on dosing wt 53.2kg):   Estimated Energy Needs: (28-32kcal/kg): 1486-1752kcal  Estimated Protein Needs: (1.2-1.6g protein/kg): 64-85g protein  Estimated Fluid Needs: defer to team  [place] Equation:    Previous Nutrition Diagnosis:   Nutrition Diagnosis is: [] ongoing  [] resolved [] not applicable     New Nutrition Diagnosis: [] Not applicable    Nutrition Care Plan:  [x] In Progress  [] Achieved  [] Not applicable       Recommendations:      1. In setting of administration of synthroid (PO), recommend to change EN feeds to Glucerna 1.2 at 80ml/hr x 18 hrs. Provides: 1440 ml, 1728 kcal (32 kcal/kg), 86 g protein (1.6 g protein/kg), 1162 ml free water) based on dosing wt 53.2kg.   2. Monitor GI tolerance. RD to remain available to adjust EN formulary, volume/rate PRN.   3. Antihyperglycemic regimen deferred to team.   4. Monitor wt trends/labs/skin integrity/hydration status/bowel regularity.     Monitoring and Evaluation:   Continue to monitor nutritional intake, tolerance to diet prescription, weights, labs, skin integrity    RD remains available upon request and will follow up per protocol

## 2022-12-27 NOTE — PROGRESS NOTE ADULT - SUBJECTIVE AND OBJECTIVE BOX
NSCU Progress Note    Assessment/Hospital Course:    72F hx of HTN, DM who initially presented to [facility] 12/18 for worsening HA and Rt hemplegia LKW 230pm, [id number] NIHSS 24, CTH with L thalamic heme w/ IVH and HCP w/ extension into midbrain and terporoparietal lobe, intubated in ED for airway protecton and EVD placed, admitted to OSH MICU then on interval CTH with expansion of heme and ?spot sign on CTA, txer to [facility] for possible angio.      24 Hour Events/Subjective:  - EVD@10, ICP 20-25  - h/h stable  - d3 CTX for UTI  - ongoing goc      REVIEW OF SYSTEMS:  - negative except as above    VITALS:   - Reviewed      IMAGING/DATA:   - Reviewed          PHYSICAL EXAM:    General: ett to vent  CVS: RR  Pulm: CTAB. mechanical bs  GI: Soft, NTND  Extremities: No LE Edema  Neuro: EO to noxious, ?FC on LUE (2 fingers, squeezes hand), pupils 2mm b/l, gaze midline, +corneals, + cough, localizes with L arm AG, no movement in R arm, withdraws L leg in plane of bed, TF R leg  NSCU Progress Note    Assessment/Hospital Course:    72F hx of HTN, DM who initially presented to [facility] 12/18 for worsening HA and Rt hemplegia LKW 230pm, [id number] NIHSS 24, CTH with L thalamic heme w/ IVH and HCP w/ extension into midbrain and terporoparietal lobe, intubated in ED for airway protecton and EVD placed, admitted to OSH MICU then on interval CTH with expansion of heme and ?spot sign on CTA, txer to [facility] for possible angio.      24 Hour Events/Subjective:  - EVD raised to 15  - d4 CTX for UTI  - ongoing goc      REVIEW OF SYSTEMS:  - negative except as above    VITALS:   - Reviewed      IMAGING/DATA:   - Reviewed          PHYSICAL EXAM:    General: ett to vent  CVS: RR  Pulm: CTAB. mechanical bs  GI: Soft, NTND  Extremities: No LE Edema  Neuro: eyes open, does not attend, does not follow commands, BTT on the L, + cough, moves L arm spontaneously in the plane of bed, R arm flexes to noxious, withdraws L leg in plane of bed, TF R leg  NSCU Progress Note    Assessment/Hospital Course:    72F hx of HTN, DM who initially presented to [facility] 12/18 for worsening HA and Rt hemplegia LKW 230pm, [id number] NIHSS 24, CTH with L thalamic heme w/ IVH and HCP w/ extension into midbrain and terporoparietal lobe, intubated in ED for airway protecton and EVD placed, admitted to OSH MICU then on interval CTH with expansion of heme and ?spot sign on CTA, txer to [facility] for possible angio.      24 Hour Events/Subjective:  - EVD raised to 20  - d4 CTX for UTI, growing ESBL ecoli in urine  - ongoing [facility]      REVIEW OF SYSTEMS:  - negative except as above    VITALS:   - Reviewed      IMAGING/DATA:   - Reviewed          PHYSICAL EXAM:    General: ett to vent  CVS: RR  Pulm: CTAB. mechanical bs  GI: Soft, NTND  Extremities: No LE Edema  Neuro: eyes open, does not attend, does not follow commands, BTT on the L, + cough, moves L arm spontaneously in the plane of bed, R arm flexes to noxious, withdraws L leg in plane of bed, TF R leg

## 2022-12-27 NOTE — CONSULT NOTE ADULT - ASSESSMENT
Patient is a 72y female with a past history of HTN and  DM who was admitted to [facility] on 12/19 with headache, confusion and noted to have BP of 200 with CT scan showing ICH. She was intubated for airway protection and transferred to the NSICU. She required placement of EVD on 12/22 , has remained on vent, has been managed conservatively.She has been febrile since admission, received CTX 12/24-12/27 and was noted today to have ESBL E Coli isolated from her urine.  She has been poorly responsive on vent, palliative care has been involved, and goals of care discussions have been ongoing.  I think we most likely are dealing with central fever , however this is always a diagnosis of exclusion.The isolation of E Coli, or ESBL E coli does not always translate out in to need to treat. The organism  can function as a colonizer  and with negative blood cultures and only 1 wbc I suspect it is a colonizer here.  Suggest:  1. supportive care  2.favor observation off antibiotics  3.Ertapenem 1 gram daily could be used if needed but I think we should simply observe for now.

## 2022-12-27 NOTE — PROGRESS NOTE ADULT - ASSESSMENT
ASSESSMENT/PLAN:    72F hx of HTN, DM who initially presented to [facility] 12/18 for worsening HA and Rt hemplegia LKW 230pm, [id number] NIHSS 24, CTH with L thalamic heme w/ IVH and HCP w/ extension into midbrain and terporoparietal lobe, intubated in ED for airway protecton and EVD placed, admitted to OSH MICU then on interval CTH with expansion of heme and ?spot sign on CTA, txer to [facility] for possible angio.    NEURO:  s/p EVD soft pass 12/22  - neuro checks q2h  - no angio per nsg  - CTH today relatively unchanged  - failed EVD clamp trial 12/21 for high ICPs ~20min  - EVD@10, monitor ICPs  - pain control  - Central fevers: bromocriptine   - DNR, ongoing GOC, palliative following    CVS:  - SBP goal   - losartan 100  - amlodipine 10  - Rt axillary [place] placed 12/19  - labetolol 100mg q8     PULM:  Intubated at OSH ED for airway protection  - ETT to vent  - CXR daily  - mucomyst, nebs  - ABG  - CPAP    RENAL:  - Fluids: IVL  - daily IOs  - IOs daily  - Na 135-145  - BMPqdh    GI:  - Diet: TF at goal  - GI prophylaxis: PPI while intubated  - Bowel regimen: miralax, senna  - last BM 12/25    ENDO:   - FS goal 120-180  - synthroid  - NPH 14q6h     HEME/ONC:  s/p 1uprbc 12/21 for drop in h/h  - SCDs  - Chemoppx: SQL  - Trend h/h    ID:  - pancultured 12/23  - f/u cultures  - MRSA neg 12/19  - CTX 12/24 x5d for UTI fu cultures    Dispo: ICU for mechanical ventillation, EVD   ASSESSMENT/PLAN:    73 yo woman with HTN and DM, presented to [facility] 12/18 with HA and R hemiplegia, NIHSS 24, 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 S/p EVD. CTA negative for vascular malformation. With subsequent expansion of ICH on next CTH. MRI brain without any acute findings.       NEURO:  s/p EVD soft pass 12/22  - neuro checks q1h while challenging evd  - failed EVD clamp trial 12/21 for high ICPs ~20min  - EVD raised @20 today, monitor ICPs  - pain control  - DNR, ongoing GOC, palliative following    CVS:  - SBP goal   - losartan 100  - amlodipine 10  - Rt axillary [place] placed 12/19  - labetolol 100mg q8     PULM:  Intubated at OSH ED for airway protection  - ETT to vent  - CXR daily  - mucomyst, nebs  - ABG  - CPAP    RENAL:  - Fluids: IVL  - daily IOs  - IOs daily  - Na 135-145  - BMPqdh    GI:  - Diet: TF at goal  - GI prophylaxis: PPI while intubated  - Bowel regimen: miralax, senna  - last BM 12/25    ENDO:   - FS goal 120-180  - synthroid  - NPH 15q6h     HEME/ONC:  s/p 1uprbc 12/21 for drop in h/h  - SCDs  - Chemoppx: SQL  - Trend h/h    ID:  - pancultured 12/23  - f/u cultures  - MRSA neg 12/19  - CTX 12/24 x5d for UTI fu cultures    Dispo: ICU for mechanical ventillation, EVD   ASSESSMENT/PLAN:    71 yo woman with HTN and DM, presented to [facility] 12/18 with HA and R hemiplegia, NIHSS 24, 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 S/p EVD. CTA negative for vascular malformation. With subsequent expansion of ICH on next CTH. MRI brain without any acute findings.       NEURO:  s/p EVD soft pass 12/22  - neuro checks q1h while challenging evd  - failed EVD clamp trial 12/21 for high ICPs ~20min  - EVD raised @20 today, monitor ICPs  - pain control  - DNR, ongoing GOC, palliative following    CVS:  - SBP goal   - losartan 100  - amlodipine 10  - Rt axillary [place] placed 12/19  - labetolol 100mg q8     PULM:  Intubated at OSH ED for airway protection  - ETT to vent  - CXR daily  - mucomyst, nebs  - ABG  - CPAP    RENAL:  - Fluids: IVL  - daily IOs  - IOs daily  - Na 135-145  - BMPqdh    GI:  - Diet: TF at goal  - GI prophylaxis: PPI while intubated  - Bowel regimen: miralax, senna  - last BM 12/26    ENDO:   - FS goal 120-180  - synthroid  - NPH 18q6h     HEME/ONC:  s/p 1uprbc 12/21 for drop in h/h  - SCDs  - Chemoppx: SQL  - Trend h/h    ID:  CTX (12/24-12/27)  - pancultured 12/23  - f/u cultures  - MRSA neg 12/19  - Meropenem (12/27 - )x7d for ESBL Ecoli in urine   - ID consult    Dispo: ICU for mechanical ventillation, EVD

## 2022-12-27 NOTE — PROGRESS NOTE ADULT - SUBJECTIVE AND OBJECTIVE BOX
Interval events:  EVD at 10cm H2O, output 107 for dayshift.     VITALS:  T(C): , Max: 38.9 (12-27-22 @ 05:00)  HR:  (79 - 93)  BP: --  ABP:  (87/43 - 138/62)  RR:  (12 - 18)  SpO2:  (100% - 100%)  Wt(kg): --  Device: Avea, Mode: CPAP with PS, FiO2: 30, PEEP: 5, PS: 10, ITime: 0.94, MAP: 8, PIP: 15    12-26-22 @ 07:01  -  12-27-22 @ 07:00  --------------------------------------------------------  IN: 2070 mL / OUT: 1365 mL / NET: 705 mL    12-27-22 @ 07:01  -  12-27-22 @ 19:37  --------------------------------------------------------  IN: 720 mL / OUT: 563 mL / NET: 157 mL      LABS:  Na: 138 (12-27 @ 00:58), 138 (12-25 @ 21:51), 134 (12-24 @ 23:14)  K: 3.8 (12-27 @ 00:58), 4.7 (12-25 @ 21:51), 3.7 (12-24 @ 23:14)  Cl: 98 (12-27 @ 00:58), 99 (12-25 @ 21:51), 102 (12-24 @ 23:14)  CO2: 29 (12-27 @ 00:58), 27 (12-25 @ 21:51), 21 (12-24 @ 23:14)  BUN: 23 (12-27 @ 00:58), 18 (12-25 @ 21:51), 16 (12-24 @ 23:14)  Cr: 0.48 (12-27 @ 00:58), 0.37 (12-25 @ 21:51), 0.68 (12-24 @ 23:14)  Glu: 171(12-27 @ 00:58), 109(12-25 @ 21:51), 111(12-24 @ 23:14)    Hgb: 8.6 (12-27 @ 00:58), 9.9 (12-25 @ 21:51), 11.7 (12-24 @ 23:14)  Hct: 27.4 (12-27 @ 00:58), 32.0 (12-25 @ 21:51), 34.9 (12-24 @ 23:14)  WBC: 14.59 (12-27 @ 00:58), 18.00 (12-25 @ 21:51), 9.89 (12-24 @ 23:14)  Plt: 433 (12-27 @ 00:58), 420 (12-25 @ 21:51), 480 (12-24 @ 23:14)    MEDICATIONS:  acetaminophen     Tablet .. 650 milliGRAM(s) Oral every 6 hours PRN  amLODIPine   Tablet 10 milliGRAM(s) Oral daily  chlorhexidine 0.12% Liquid 15 milliLiter(s) Oral Mucosa every 12 hours  chlorhexidine 4% Liquid 1 Application(s) Topical <User Schedule>  dextrose 5%. 1000 milliLiter(s) IV Continuous <Continuous>  dextrose 5%. 1000 milliLiter(s) IV Continuous <Continuous>  dextrose 50% Injectable 25 Gram(s) IV Push once  dextrose 50% Injectable 12.5 Gram(s) IV Push once  dextrose 50% Injectable 25 Gram(s) IV Push once  dextrose Oral Gel 15 Gram(s) Oral once PRN  enoxaparin Injectable 40 milliGRAM(s) SubCutaneous every 24 hours  glucagon  Injectable 1 milliGRAM(s) IntraMuscular once  insulin lispro (ADMELOG) corrective regimen sliding scale   SubCutaneous every 6 hours  insulin NPH human recombinant 18 Unit(s) SubCutaneous every 6 hours  labetalol 100 milliGRAM(s) Oral every 8 hours  levothyroxine 25 MICROGram(s) Oral daily  losartan 100 milliGRAM(s) Enteral Tube daily  pantoprazole  Injectable 40 milliGRAM(s) IV Push daily  polyethylene glycol 3350 17 Gram(s) Oral every 12 hours  senna Syrup 10 milliLiter(s) Oral at bedtime    EXAMINATION:  General:  in NAD  HEENT:  with ET tube   Neuro:  eyes open, does not attend, does not follow commands, BTT on the L, + cough, moves L arm spontaneously in the plane of bed, R arm flexes to noxious, withdraws L leg in plane of bed, TF R leg   Cards:  RRR  Respiratory:  no respiratory distress  Abdomen:  soft  Extremities:  no LE edema    Assessment/Plan:   73 yo woman with HTN and DM, presented to [facility] 12/18 with HA and R hemiplegia, NIHSS 24, 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 S/p EVD. CTA negative for vascular malformation. With subsequent expansion of ICH on next CTH. MRI brain without any acute findings.     c/w EVD at 10cm H2O  SBP goal 100-160  on TF, LBM 12/25  PPI   ceftriaxone for UTI  Lov ppx     Patient seen and examined by attending on 12/27/2022.    Patient is critically ill due to ICH with IVH and at high risk for neurological deterioration or death due to: potential expansion of ICH, hydrocephalus requiring an EVD for CSF diversion, inability to protect airway due to neurologic injury requiring mechanical ventilation.    Interval events:  EVD raised to 20 mm H2O during dayshift.   Febrile, last cultured 12/26. Abx d/c'ed as per ID.    VITALS:  T(C): , Max: 38.9 (12-27-22 @ 05:00)  HR:  (79 - 93)  BP: --  ABP:  (87/43 - 138/62)  RR:  (12 - 18)  SpO2:  (100% - 100%)  Wt(kg): --  Device: Avea, Mode: CPAP with PS, FiO2: 30, PEEP: 5, PS: 10, ITime: 0.94, MAP: 8, PIP: 15    12-26-22 @ 07:01  -  12-27-22 @ 07:00  --------------------------------------------------------  IN: 2070 mL / OUT: 1365 mL / NET: 705 mL    12-27-22 @ 07:01  -  12-27-22 @ 19:37  --------------------------------------------------------  IN: 720 mL / OUT: 563 mL / NET: 157 mL      LABS:  Na: 138 (12-27 @ 00:58), 138 (12-25 @ 21:51), 134 (12-24 @ 23:14)  K: 3.8 (12-27 @ 00:58), 4.7 (12-25 @ 21:51), 3.7 (12-24 @ 23:14)  Cl: 98 (12-27 @ 00:58), 99 (12-25 @ 21:51), 102 (12-24 @ 23:14)  CO2: 29 (12-27 @ 00:58), 27 (12-25 @ 21:51), 21 (12-24 @ 23:14)  BUN: 23 (12-27 @ 00:58), 18 (12-25 @ 21:51), 16 (12-24 @ 23:14)  Cr: 0.48 (12-27 @ 00:58), 0.37 (12-25 @ 21:51), 0.68 (12-24 @ 23:14)  Glu: 171(12-27 @ 00:58), 109(12-25 @ 21:51), 111(12-24 @ 23:14)    Hgb: 8.6 (12-27 @ 00:58), 9.9 (12-25 @ 21:51), 11.7 (12-24 @ 23:14)  Hct: 27.4 (12-27 @ 00:58), 32.0 (12-25 @ 21:51), 34.9 (12-24 @ 23:14)  WBC: 14.59 (12-27 @ 00:58), 18.00 (12-25 @ 21:51), 9.89 (12-24 @ 23:14)  Plt: 433 (12-27 @ 00:58), 420 (12-25 @ 21:51), 480 (12-24 @ 23:14)    MEDICATIONS:  acetaminophen     Tablet .. 650 milliGRAM(s) Oral every 6 hours PRN  amLODIPine   Tablet 10 milliGRAM(s) Oral daily  chlorhexidine 0.12% Liquid 15 milliLiter(s) Oral Mucosa every 12 hours  chlorhexidine 4% Liquid 1 Application(s) Topical <User Schedule>  dextrose 5%. 1000 milliLiter(s) IV Continuous <Continuous>  dextrose 5%. 1000 milliLiter(s) IV Continuous <Continuous>  dextrose 50% Injectable 25 Gram(s) IV Push once  dextrose 50% Injectable 12.5 Gram(s) IV Push once  dextrose 50% Injectable 25 Gram(s) IV Push once  dextrose Oral Gel 15 Gram(s) Oral once PRN  enoxaparin Injectable 40 milliGRAM(s) SubCutaneous every 24 hours  glucagon  Injectable 1 milliGRAM(s) IntraMuscular once  insulin lispro (ADMELOG) corrective regimen sliding scale   SubCutaneous every 6 hours  insulin NPH human recombinant 18 Unit(s) SubCutaneous every 6 hours  labetalol 100 milliGRAM(s) Oral every 8 hours  levothyroxine 25 MICROGram(s) Oral daily  losartan 100 milliGRAM(s) Enteral Tube daily  pantoprazole  Injectable 40 milliGRAM(s) IV Push daily  polyethylene glycol 3350 17 Gram(s) Oral every 12 hours  senna Syrup 10 milliLiter(s) Oral at bedtime    EXAMINATION:  Please see exam from daytime.     Assessment/Plan:   71 yo woman with HTN and DM, presented to [facility] 12/18 with HA and R hemiplegia, NIHSS 24, 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 S/p EVD. CTA negative for vascular malformation. With subsequent expansion of ICH on next CTH. MRI brain without any acute findings.     c/w EVD at 20cm H2O  SBP goal 100-160, on amlodipine, losartan and labetalol   on TF, LBM 12/28  PPI   off abx   Lov ppx     Lesley James  Neurocritical Care Attending

## 2022-12-28 NOTE — PROVIDER CONTACT NOTE (OTHER) - REASON
ICP 23
FS 77
EVD with sluggish anterograde and retrograde flow
ICP 23
Pt's ICP 34
ICP greater than 20
Vaginal skin tear noted
changed in neuro exam
EVD no longer patent
EVD with sluggish retrograde flow and no anterograde flow

## 2022-12-28 NOTE — PROGRESS NOTE ADULT - ASSESSMENT
ASSESSMENT/PLAN:    73 yo woman with HTN and DM, presented to [facility] 12/18 with HA and R hemiplegia, NIHSS 24, 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 S/p EVD. CTA negative for vascular malformation. With subsequent expansion of ICH on next CTH. MRI brain without any acute findings.       NEURO:  s/p EVD soft pass 12/22  - neuro checks q1h while challenging evd  - failed EVD clamp trial 12/21 for high ICPs ~20min  - EVD raised @20 today, monitor ICPs  - pain control  - DNR, ongoing GOC, palliative following    CVS:  - SBP goal   - losartan 100  - amlodipine 10  - Rt axillary [place] placed 12/19  - labetolol 100mg q8     PULM:  Intubated at OSH ED for airway protection  - ETT to vent  - CXR daily  - mucomyst, nebs  - ABG  - CPAP    RENAL:  - Fluids: IVL  - daily IOs  - IOs daily  - Na 135-145  - BMPqdh    GI:  - Diet: TF at goal  - GI prophylaxis: PPI while intubated  - Bowel regimen: miralax, senna  - last BM 12/26    ENDO:   - FS goal 120-180  - synthroid  - NPH 18q6h     HEME/ONC:  s/p 1uprbc 12/21 for drop in h/h  - SCDs  - Chemoppx: SQL  - Trend h/h    ID:  CTX (12/24-12/27)  - pancultured 12/23  - f/u cultures  - MRSA neg 12/19  - Meropenem (12/27 - )x7d for ESBL Ecoli in urine   - ID consult    Dispo: ICU for mechanical ventillation, EVD   ASSESSMENT/PLAN:    73 yo woman with HTN and DM, presented to [facility] 12/18 with HA and R hemiplegia, NIHSS 24, 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 S/p EVD. CTA negative for vascular malformation. With subsequent expansion of ICH on next CTH. MRI brain without any acute findings.     NEURO:  s/p EVD soft pass 12/22  - neuro checks q1h while challenging evd  - failed EVD clamp trial 12/21 for high ICPs ~20min  - EVD raised @20, monitor ICPs  - pain control  - DNR, ongoing GOC, palliative following    CVS:  - SBP goal   - losartan 100  - amlodipine 10  - Rt axillary [place] placed 12/19  - labetolol 100mg q8     PULM:  Intubated at OSH ED for airway protection  - ETT to vent  - CXR daily  - mucomyst, nebs  - ABG  - CPAP    RENAL:  - Fluids: IVL  - daily IOs  - IOs daily  - Na 135-145  - BMPqdh    GI:  - Diet: TF at goal  - GI prophylaxis: PPI while intubated  - Bowel regimen: miralax, senna  - last BM 12/27    ENDO:   - FS goal 120-180  - synthroid  - NPH 18q6h     HEME/ONC:  s/p 1uprbc 12/21 for drop in h/h  - SCDs  - Chemoppx: SQL  - Trend h/h    ID:  CTX (12/24-12/27)  - last cvultured 12/26  - likely colonized ESBL in urine; monitor off abx per ID  - MRSA neg 12/19  - ID following    Dispo: ICU for mechanical ventillation, EVD   ASSESSMENT/PLAN:    71 yo woman with HTN and DM, presented to [facility] 12/18 with HA and R hemiplegia, NIHSS 24, 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 S/p EVD. CTA negative for vascular malformation. With subsequent expansion of ICH on next CTH. MRI brain without any acute findings.     NEURO:  s/p EVD soft pass 12/22  - neuro checks q1h while challenging evd  - failed EVD clamp trial 12/21 for high ICPs ~20min  - EVD raised @20, monitor ICPs  - pain control  - DNR, ongoing GOC, palliative following  - central fever: cooling blanket, bromocriptine    CVS:  - SBP goal   - losartan 100  - amlodipine 10  - Rt axillary [place] placed 12/19  - labetolol 100mg q8     PULM:  Intubated at OSH ED for airway protection  - ETT to vent  - CXR daily  - mucomyst, nebs  - ABG  - CPAP    RENAL:  - Fluids: IVL  - daily IOs  - IOs daily  - Na 135-145  - BMPqdh    GI:  - Diet: TF at goal  - GI prophylaxis: PPI while intubated  - Bowel regimen: miralax, senna  - last BM 12/27    ENDO:   - FS goal 120-180  - synthroid  - NPH 18q6h     HEME/ONC:  s/p 1uprbc 12/21 for drop in h/h  - SCDs  - Chemoppx: SQL  - Trend h/h    ID:  CTX (12/24-12/27)  - last cvultured 12/26  - likely colonized ESBL in urine; monitor off abx per ID  - MRSA neg 12/19  - ID following    Dispo: ICU for mechanical ventillation, EVD

## 2022-12-28 NOTE — PROGRESS NOTE ADULT - SUBJECTIVE AND OBJECTIVE BOX
NSCU Progress Note    Assessment/Hospital Course:    72F hx of HTN, DM who initially presented to [facility] 12/18 for worsening HA and Rt hemplegia LKW 230pm, [id number] NIHSS 24, CTH with L thalamic heme w/ IVH and HCP w/ extension into midbrain and terporoparietal lobe, intubated in ED for airway protecton and EVD placed, admitted to OSH MICU then on interval CTH with expansion of heme and ?spot sign on CTA, txer to [facility] for possible angio.      24 Hour Events/Subjective:  - EVD@20, no ICP issues  - ongoing goc      REVIEW OF SYSTEMS:  - negative except as above    VITALS:   - Reviewed      IMAGING/DATA:   - Reviewed          PHYSICAL EXAM:    General: ett to vent  CVS: RR  Pulm: CTAB. mechanical bs  GI: Soft, NTND  Extremities: No LE Edema  Neuro: eyes open, does not attend, does not follow commands, BTT on the L, + cough, moves L arm spontaneously in the plane of bed, R arm flexes to noxious, withdraws L leg in plane of bed, TF R leg  NSCU Progress Note    Assessment/Hospital Course:    72F hx of HTN, DM who initially presented to [facility] 12/18 for worsening HA and Rt hemplegia LKW 230pm, [id number] NIHSS 24, CTH with L thalamic heme w/ IVH and HCP w/ extension into midbrain and terporoparietal lobe, intubated in ED for airway protecton and EVD placed, admitted to OSH MICU then on interval CTH with expansion of heme and ?spot sign on CTA, txer to [facility] for possible angio.      24 Hour Events/Subjective:  - EVD@20, clamped at 7am, unclamped 1 hour later due to high ICPs   - febrile, cultures pending   - ongoing C    REVIEW OF SYSTEMS:  - negative except as above    VITALS:   - Reviewed      IMAGING/DATA:   - Reviewed          PHYSICAL EXAM:    General: ett to vent  CVS: RR  Pulm: CTAB. mechanical bs  GI: Soft, NTND  Extremities: No LE Edema  Neuro: eyes open, does not attend, does not follow commands, BTT on the L, + cough, moves L arm spontaneously in the plane of bed, R arm flexes to noxious, withdraws L leg in plane of bed, TF R leg

## 2022-12-28 NOTE — PROGRESS NOTE ADULT - SUBJECTIVE AND OBJECTIVE BOX
NSCU Progress Note    Assessment/Hospital Course:    72F hx of HTN, DM who initially presented to [facility] 12/18 for worsening HA and Rt hemplegia LKW 230pm, [id number] NIHSS 24, CTH with L thalamic heme w/ IVH and HCP w/ extension into midbrain and terporoparietal lobe, intubated in ED for airway protecton and EVD placed, admitted to OSH MICU then on interval CTH with expansion of heme and ?spot sign on CTA, txer to [facility] for possible angio.      24 Hour Events/Subjective:  - EVD raised to 20  - d4 CTX for UTI, growing ESBL ecoli in urine  - ongoing [facility]      REVIEW OF SYSTEMS:  - negative except as above    VITALS:   - Reviewed      IMAGING/DATA:   - Reviewed          PHYSICAL EXAM:    General: ett to vent  CVS: RR  Pulm: CTAB. mechanical bs  GI: Soft, NTND  Extremities: No LE Edema  Neuro: eyes open, does not attend, does not follow commands, BTT on the L, + cough, moves L arm spontaneously in the plane of bed, R arm flexes to noxious, withdraws L leg in plane of bed, TF R leg  NSCU Progress Note    Assessment/Hospital Course:    72F hx of HTN, DM who initially presented to [facility] 12/18 for worsening HA and Rt hemplegia LKW 230pm, [id number] NIHSS 24, CTH with L thalamic heme w/ IVH and HCP w/ extension into midbrain and terporoparietal lobe, intubated in ED for airway protecton and EVD placed, admitted to OSH MICU then on interval CTH with expansion of heme and ?spot sign on CTA, txer to [facility] for possible angio.      24 Hour Events/Subjective:  - EVD@20, no ICP issues  - ongoing goc      REVIEW OF SYSTEMS:  - negative except as above    VITALS:   - Reviewed      IMAGING/DATA:   - Reviewed          PHYSICAL EXAM:    General: ett to vent  CVS: RR  Pulm: CTAB. mechanical bs  GI: Soft, NTND  Extremities: No LE Edema  Neuro: eyes open, does not attend, does not follow commands, BTT on the L, + cough, moves L arm spontaneously in the plane of bed, R arm flexes to noxious, withdraws L leg in plane of bed, TF R leg

## 2022-12-28 NOTE — PROVIDER CONTACT NOTE (OTHER) - NAME OF MD/NP/PA/DO NOTIFIED:
MD Galo Turner
LEELA Hancock
MD Galo Turner
LEELA Pratt
MD Lesley James
NETTIE Carter
STEPHANIE Patel
MD Bethany Vital
STEPHANIE Wu
NETTIE Carter
STEPHANIE Hancock

## 2022-12-28 NOTE — PROVIDER CONTACT NOTE (OTHER) - SITUATION
changed in neuro exam
EVD no longer patent
EVD with sluggish anterograde and retrograde flow
EVD with sluggish retrograde flow and no anterograde flow
Pt's ICP 34
FS 77
ICP 23
Vaginal skin tear and serosanguineous drainage noted when straight catheterization patient for UA. Tear at vagina near perianal area.
ICP 21
Pt ICP level reading 23 from the EVD

## 2022-12-28 NOTE — PROVIDER CONTACT NOTE (OTHER) - DATE AND TIME:
24-Dec-2022 06:00
19-Dec-2022 19:15
22-Dec-2022 18:05
19-Dec-2022 19:15
22-Dec-2022 21:00
25-Dec-2022 05:45
28-Dec-2022 08:40
20-Dec-2022 22:05
24-Dec-2022 23:31
19-Dec-2022 21:00
23-Dec-2022 15:05

## 2022-12-28 NOTE — PROGRESS NOTE ADULT - SUBJECTIVE AND OBJECTIVE BOX
CC: f/u for fever post ICH    Patient reports: she is poorly interactive on vent    REVIEW OF SYSTEMS:  All other review of systems negative (Comprehensive ROS): limited, receiving OG tube feeds    Antimicrobials Day #  :off    Other Medications Reviewed  MEDICATIONS  (STANDING):  amLODIPine   Tablet 10 milliGRAM(s) Oral daily  chlorhexidine 0.12% Liquid 15 milliLiter(s) Oral Mucosa every 12 hours  chlorhexidine 4% Liquid 1 Application(s) Topical <User Schedule>  dextrose 5%. 1000 milliLiter(s) (50 mL/Hr) IV Continuous <Continuous>  dextrose 5%. 1000 milliLiter(s) (100 mL/Hr) IV Continuous <Continuous>  dextrose 50% Injectable 25 Gram(s) IV Push once  dextrose 50% Injectable 12.5 Gram(s) IV Push once  dextrose 50% Injectable 25 Gram(s) IV Push once  enoxaparin Injectable 40 milliGRAM(s) SubCutaneous every 24 hours  glucagon  Injectable 1 milliGRAM(s) IntraMuscular once  insulin lispro (ADMELOG) corrective regimen sliding scale   SubCutaneous every 6 hours  insulin NPH human recombinant 18 Unit(s) SubCutaneous every 6 hours  labetalol 100 milliGRAM(s) Oral every 8 hours  levothyroxine 25 MICROGram(s) Oral daily  losartan 100 milliGRAM(s) Enteral Tube daily  pantoprazole  Injectable 40 milliGRAM(s) IV Push daily  polyethylene glycol 3350 17 Gram(s) Oral every 12 hours  senna Syrup 10 milliLiter(s) Oral at bedtime    T(F): 101.8 (12-28-22 @ 10:00), Max: 101.8 (12-28-22 @ 10:00)  HR: 96 (12-28-22 @ 10:00)  BP: --  RR: 14 (12-28-22 @ 10:00)  SpO2: 100% (12-28-22 @ 10:00)  Wt(kg): --    PHYSICAL EXAM:  General: poorly interactive on vent, EVD in place  Eyes:  anicteric, no conjunctival injection, no discharge  Oropharynx: ETT, OGT	  Neck: supple, without adenopathy  Lungs: coarse BS  Heart: regular rate and rhythm; no murmur, rubs or gallops  Abdomen: soft, nondistended, nontender, without mass or organomegaly  Skin: no rash  Extremities: no clubbing, cyanosis,+ edema  Neurologic: poorly interactive    LAB RESULTS:                        8.8    14.23 )-----------( 511      ( 27 Dec 2022 20:19 )             29.0     12-27    138  |  99  |  20  ----------------------------<  144<H>  4.1   |  28  |  0.42<L>    Ca    9.3      27 Dec 2022 20:19  Phos  3.7     12-27  Mg     2.2     12-27          MICROBIOLOGY:  RECENT CULTURES:  12-26 @ 06:30 .Blood Blood     No growth to date.      12-26 @ 05:47 .Blood Blood     No growth to date.      12-24 @ 05:15 Combi-Cath Combi-Cath     Normal Respiratory Jojo present    Rare Squamous epithelial cells per low power field  Few polymorphonuclear leukocytes per low power field  Rare Gram Positive Cocci in Pairs and Chains per oil power field    12-24 @ 05:14 Catheterized Catheterized Escherichia coli ESBL    >100,000 CFU/ml Escherichia coli ESBL      12-23 @ 21:45 .Blood Blood     No growth to date.          RADIOLOGY REVIEWED:    < from: Xray Chest 1 View- PORTABLE-Routine (Xray Chest 1 View- PORTABLE-Routine in AM.) (12.27.22 @ 06:14) >  IMPRESSION:  Endotracheal tube tip is above the james.    Clear lungs.    < end of copied text >  < from: CT Head No Cont (12.26.22 @ 09:22) >    IMPRESSION: No change since 12/23/2022. Large left basal ganglia   hemorrhage with intraventricular extension, mass effect and midline shift   to the right, unchanged since 12/23/2022.    --- End of Report ---    < end of copied text >

## 2022-12-28 NOTE — PROVIDER CONTACT NOTE (OTHER) - RECOMMENDATIONS
Hold insulin.
ICU team at bedside
Will continue to monitor pt's ICP for 15 mins as per order, if pt's ICP remains above 25 for 15 mins then unclamp EVD as per order and keep EVD at 20 cm H20 as per order.
flush EVD?
flush EVD?
ICU team at bedside
flush EVD?
lower the transducer from 20cm to 15cm?
CT head?
ICU team at bedside
Wound consult? GYN Consult?
No

## 2022-12-28 NOTE — PROVIDER CONTACT NOTE (OTHER) - ACTION/TREATMENT ORDERED:
Provider aware. Provider to assess patient at bedside
Half dose of NPH given (7 units) as ordered. Continue to monitor fingerstick as ordered.
Neuro surgery at bedside and flushed EVD.
Provider aware, exam consistent with providers notes. no further action at this time.
Will continue to monitor pt's ICP for 15 mins as per order, if pt's ICP remains above 25 for 15 mins then unclamp EVD as per order and keep EVD at 20 cm H20 as per order.
pending orders
ICU team at bedside assessing pt. EVD lowered from [id number] to [id number] as per neurosurgery team. Continue to monitor pt as ordered.
IV tylenol ordered for fever. Continue to monitor pt. Q2 hrs for worsening neurologic s/s.
Hold humalog and NPH. Continue tube feedings. Check fingerstick as ordered.
Provider at bedside and flushed EVD proximally and distally.
pending neuro surgery recs

## 2022-12-28 NOTE — PROGRESS NOTE ADULT - ASSESSMENT
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 S/p EVD. CTA negative for vascular malformation. With subsequent expansion of ICH on next CTH. MRI brain without any acute findings.     NEURO:  s/p EVD soft pass 12/22  - neuro checks q1h while challenging evd  - failed EVD clamp trial 12/21 for high ICPs ~20min  - EVD raised @20, monitor ICPs  - pain control  - DNR, ongoing GOC, palliative following  - central fever: cooling blanket, bromocriptine    CVS:  - SBP goal   - losartan 100  - amlodipine 10  - Rt axillary Aliyah placed 12/19  - labetolol 100mg q8     PULM:  Intubated at OSH ED for airway protection  - ETT to vent  - CXR daily  - mucomyst, nebs  - ABG  - CPAP    RENAL:  - Fluids: IVL  - daily IOs  - IOs daily  - Na 135-145  - BMPqdh    GI:  - Diet: TF at goal  - GI prophylaxis: PPI while intubated  - Bowel regimen: miralax, senna  - last BM 12/27    ENDO:   - FS goal 120-180  - synthroid  - NPH 18q6h     HEME/ONC:  s/p 1uprbc 12/21 for drop in h/h  - SCDs  - Chemoppx: SQL  - Trend h/h    ID:  CTX (12/24-12/27)  - last cvultured 12/26  - likely colonized ESBL in urine; monitor off abx per ID  - MRSA neg 12/19  - ID following    Dispo: ICU for mechanical ventillation, EVD   ASSESSMENT/PLAN:    71 yo woman with HTN and DM, presented to [facility] 12/18 with HA and R hemiplegia, NIHSS 24, 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 S/p EVD. CTA negative for vascular malformation. With subsequent expansion of ICH on next CTH. MRI brain without any acute findings.     NEURO:  s/p EVD soft pass 12/22  - neuro checks q1h while challenging evd  - failed EVD clamp trial 12/21 for high ICPs ~20min  - failed repeat EVD clamp trial today due to high ICPs  - EVD raised @20, monitor ICPs  - pain control  - DNR, ongoing GOC, palliative following  - central fever: cooling blanket, bromocriptine    CVS:  - SBP goal   - losartan 100mg  - amlodipine 10mg  - Rt axillary Aliyah placed 12/19  - labetolol 100mg q8     PULM:  Intubated at OSH ED for airway protection  - ETT to vent  - CXR daily  - mucomyst, nebs  - ABG  - CPAP as tolerated     RENAL:  - Fluids: IVL  - daily IOs  - IOs daily  - Na 135-145  - BMPqdh    GI:  - Diet: TF at goal  - GI prophylaxis: PPI while intubated  - Bowel regimen: miralax, senna  - last BM 12/28     ENDO:   - FS goal 120-180  - synthroid  - NPH 18q6h     HEME/ONC:  s/p 1u prbc 12/21 for drop in h/h  - SCDs  - Chemoppx: SQL  - Trend H/H    ID:  CTX (12/24-12/27)  - last cultured 12/28  - likely colonized ESBL in urine; monitor off abx per ID  - MRSA neg 12/19  - ID following    Dispo: ICU for mechanical ventillation, EVD    45 minutes of critical care time provided

## 2022-12-28 NOTE — PROGRESS NOTE ADULT - ASSESSMENT
Patient is a 72y female with a past history of HTN and  DM who was admitted to [facility] on 12/19 with headache, confusion and noted to have BP of 200 with CT scan showing ICH. She was intubated for airway protection and transferred to the NSICU. She required placement of EVD on 12/22 , has remained on vent, has been managed conservatively.She has been febrile since admission, received CTX 12/24-12/27 and was noted today to have ESBL E Coli isolated from her urine.  She has been poorly responsive on vent, palliative care has been involved, and goals of care discussions have been ongoing.  I think we most likely are dealing with central fever , however this is always a diagnosis of exclusion.The isolation of E Coli, or ESBL E coli does not always translate out in to need to treat. The organism  can function as a colonizer  and with negative blood cultures and only 1 wbc I suspect it is a colonizer here.  Her fever may persist for some time given extent of ICH  Suggest:  1. supportive care  2.favor observation off antibiotics  3.Ertapenem 1 gram daily could be used if needed but I think we should simply observe for now.  4.Monitor exam, labs, imaging

## 2022-12-29 NOTE — PROGRESS NOTE ADULT - ASSESSMENT
ASSESSMENT/PLAN:    71 yo woman with HTN and DM, presented to [facility] 12/18 with HA and R hemiplegia, NIHSS 24, 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 S/p EVD. CTA negative for vascular malformation. With subsequent expansion of ICH on next CTH. MRI brain without any acute findings.     NEURO:  s/p EVD soft pass 12/22  - neuro checks q1h while challenging evd  - failed EVD clamp trial 12/21 for high ICPs ~20min  - failed repeat EVD clamp trial today due to high ICPs  - EVD raised @20, monitor ICPs  - pain control  - DNR, ongoing GOC, palliative following  - central fever: cooling blanket, bromocriptine    CVS:  - SBP goal   - losartan 100mg  - amlodipine 10mg  - Rt axillary Aliyah placed 12/19  - labetolol 100mg q8     PULM:  Intubated at OSH ED for airway protection  - ETT to vent  - CXR daily  - mucomyst, nebs  - ABG  - CPAP as tolerated     RENAL:  - Fluids: IVL  - daily IOs  - IOs daily  - Na 135-145  - BMPqdh    GI:  - Diet: TF at goal  - GI prophylaxis: PPI while intubated  - Bowel regimen: miralax, senna  - last BM 12/28     ENDO:   - FS goal 120-180  - synthroid  - NPH 18q6h     HEME/ONC:  s/p 1u prbc 12/21 for drop in h/h  - SCDs  - Chemoppx: SQL  - Trend H/H    ID:  CTX (12/24-12/27)  - last cultured 12/28  - likely colonized ESBL in urine; monitor off abx per ID  - MRSA neg 12/19  - ID following    Dispo: ICU for mechanical ventillation, EVD    45 minutes of critical care time provided     ASSESSMENT/PLAN:    73 yo woman with HTN and DM, presented to [facility] 12/18 with HA and R hemiplegia, NIHSS 24, 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 S/p EVD. CTA negative for vascular malformation. With subsequent expansion of ICH on next CTH. MRI brain without any acute findings.     NEURO:  s/p EVD soft pass 12/22  - neuro checks q4h  - failed EVD clamp trial 12/21 for high ICPs ~20min  - failed repeat EVD clamp trial today due to high ICPs  - EVD raised @20, monitor ICPs  - pain control  - DNR, ongoing GOC, palliative following  - central fever: cooling blanket, bromocriptine    CVS:  - SBP goal   - losartan 100mg  - amlodipine 10mg  - Rt axillary Aliyah placed 12/19  - labetolol 100mg q8     PULM:  Intubated at OSH ED for airway protection  - ETT to vent  - CXR daily  - mucomyst, nebs  - ABG  - CPAP as tolerated     RENAL:  - Fluids: IVL  - daily IOs  - IOs daily  - Na 135-145  - BMPqdh    GI:  - Diet: TF at goal  - GI prophylaxis: PPI while intubated  - Bowel regimen: miralax, senna  - last BM 12/28    ENDO:   - FS goal 120-180  - synthroid  - NPH 20q6h     HEME/ONC:  s/p 1u prbc 12/21 for drop in h/h  - SCDs  - Chemoppx: SQL  - Trend H/H    ID:  CTX (12/24-12/27)  - last cultured 12/28  - likely colonized ESBL in urine; monitor off abx per ID  - MRSA neg 12/19  - ID following    Dispo: ICU for mechanical ventillation, EVD       ASSESSMENT/PLAN:    71 yo woman with HTN and DM, presented to [facility] 12/18 with HA and R hemiplegia, NIHSS 24, 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 S/p EVD. CTA negative for vascular malformation. With subsequent expansion of ICH on next CTH. MRI brain without any acute findings.     NEURO:  s/p EVD soft pass 12/22  - neuro checks q4h  - failed EVD clamp trial 12/21 for high ICPs ~20min  - failed repeat EVD clamp trial today due to high ICPs  - EVD raised @20, monitor ICPs  - pain control  - DNR, ongoing GOC, palliative following  - central fever: cooling blanket, bromocriptine    CVS:  - SBP goal   - losartan 100mg  - amlodipine 10mg  - Rt axillary Aliyah placed 12/19  - labetolol 100mg q8     PULM:  Intubated at OSH ED for airway protection  - ETT to vent  - CXR daily  - mucomyst, nebs  - ABG  - CPAP as tolerated     RENAL:  - Fluids: IVL  - daily IOs  - IOs daily  - Na 135-145  - BMPqdh    GI:  - Diet: TF at goal  - GI prophylaxis: PPI while intubated  - Bowel regimen: miralax, senna  - last BM 12/28    ENDO:   - FS goal 120-180  - synthroid  - NPH 20q6h     HEME/ONC:  s/p 1u prbc 12/21 for drop in h/h  - SCDs  - Chemoppx: SQL  - Trend H/H    ID:  CTX (12/24-12/27)  - last cultured 12/28  - likely colonized ESBL in urine; monitor off abx per ID  - MRSA neg 12/19  - ID following    DNR    Dispo: ICU for mechanical ventillation, EVD  respiratory failure requiring intubation, multiple failed clamp trials for elevated ICPs, requiring EVD for CSF diversion

## 2022-12-29 NOTE — PROGRESS NOTE ADULT - SUBJECTIVE AND OBJECTIVE BOX
NSCU Progress Note    Assessment/Hospital Course:    72F hx of HTN, DM who initially presented to [facility] 12/18 for worsening HA and Rt hemplegia LKW 230pm, [id number] NIHSS 24, CTH with L thalamic heme w/ IVH and HCP w/ extension into midbrain and terporoparietal lobe, intubated in ED for airway protecton and EVD placed, admitted to OSH MICU then on interval CTH with expansion of heme and ?spot sign on CTA, txer to [facility] for possible angio.      24 Hour Events/Subjective:  - EVD@20, no ICP issues  - ongoing goc      REVIEW OF SYSTEMS:  - negative except as above    VITALS:   - Reviewed      IMAGING/DATA:   - Reviewed          PHYSICAL EXAM:    General: ett to vent  CVS: RR  Pulm: CTAB. mechanical bs  GI: Soft, NTND  Extremities: No LE Edema  Neuro: eyes open, does not attend, does not follow commands, BTT on the L, + cough, moves L arm spontaneously in the plane of bed, R arm flexes to noxious, withdraws L leg in plane of bed, TF R leg  NSCU Progress Note    Assessment/Hospital Course:    72F hx of HTN, DM who initially presented to [facility] 12/18 for worsening HA and Rt hemplegia LKW 230pm, [id number] NIHSS 24, CTH with L thalamic heme w/ IVH and HCP w/ extension into midbrain and terporoparietal lobe, intubated in ED for airway protecton and EVD placed, admitted to OSH MICU then on interval CTH with expansion of heme and ?spot sign on CTA, txer to [facility] for possible angio.      24 Hour Events/Subjective:  - EVD@20, no ICP issues; failed clamp triax2 last on 12/29 d/t sustained ICP >22  - ongoing [facility]      REVIEW OF SYSTEMS:  - negative except as above    VITALS:   - Reviewed      IMAGING/DATA:   - Reviewed          PHYSICAL EXAM:    General: ett to vent  CVS: RR  Pulm: CTAB. mechanical bs  GI: Soft, NTND  Extremities: No LE Edema  Neuro: eyes open, does not attend, does not follow commands, BTT on the L, + cough, moves L arm spontaneously in the plane of bed, R arm flexes to noxious, withdraws L leg in plane of bed, TF R leg  NSCU Progress Note    Assessment/Hospital Course:    72F hx of HTN, DM who initially presented to [facility] 12/18 for worsening HA and Rt hemplegia LKW 230pm, [id number] NIHSS 24, CTH with L thalamic heme w/ IVH and HCP w/ extension into midbrain and terporoparietal lobe, intubated in ED for airway protecton and EVD placed, admitted to OSH MICU then on interval CTH with expansion of heme and ?spot sign on CTA, txer to [facility] for possible angio.    24 Hour Events/Subjective:  - EVD@20, no ICP issues; failed clamp triax2 last on 12/29 d/t sustained ICP >22  - ongoing [facility]    REVIEW OF SYSTEMS: [x] Unable to Assess due to neurologic exam   [ ] All ROS addressed below are non-contributory, except:  Neuro: [ ] Headache [ ] Back pain [ ] Numbness [ ] Weakness [ ] Ataxia [ ] Dizziness [ ] Aphasia [ ] Dysarthria [ ] Visual disturbance  Resp: [ ] Shortness of breath/dyspnea [ ] Orthopnea [ ] Cough  CV: [ ] Chest pain [ ] Palpitation [ ] Lightheadedness [ ] Syncope  Renal: [ ] Thirst [ ] Edema  GI: [ ] Nausea [ ] Emesis [ ] Abdominal pain [ ] Constipation [ ] Diarrhea  Hem: [ ] Hematemesis [ ] bBright red blood per rectum  ID: [ ] Fever [ ] Chills [ ] Dysuria  ENT: [ ] Rhinorrhea    VITALS:   - Reviewed    IMAGING/DATA:   - Reviewed    PHYSICAL EXAM:    General: ett to vent  CVS: RR  Pulm: CTAB. mechanical bs  GI: Soft, NTNDExtremities: No LE Edema  Neuro: eyes open, does not attend, does not follow commands, BTT on the L, + cough, moves L arm spontaneously in the plane of bed, R arm flexes to noxious, withdraws L leg in plane of bed, TF R leg

## 2022-12-30 NOTE — PROGRESS NOTE ADULT - SUBJECTIVE AND OBJECTIVE BOX
NSCU Progress Note    Assessment/Hospital Course:    72F hx of HTN, DM who initially presented to [facility] 12/18 for worsening HA and Rt hemplegia LKW 230pm, [id number] NIHSS 24, CTH with L thalamic heme w/ IVH and HCP w/ extension into midbrain and terporoparietal lobe, intubated in ED for airway protecton and EVD placed, admitted to OSH MICU then on interval CTH with expansion of heme and ?spot sign on CTA, txer to [facility] for possible angio.    24 Hour Events/Subjective:  - EVD@20, no ICP issues; failed clamp triax2 last on 12/29 d/t sustained ICP >22  - as per family would like to proceed with comfort care, MOLST was signed, sedated with 2mg of dilaudid, will extubate once respiratory status improves     REVIEW OF SYSTEMS: [x] Unable to Assess due to neurologic exam   [ ] All ROS addressed below are non-contributory, except:  Neuro: [ ] Headache [ ] Back pain [ ] Numbness [ ] Weakness [ ] Ataxia [ ] Dizziness [ ] Aphasia [ ] Dysarthria [ ] Visual disturbance  Resp: [ ] Shortness of breath/dyspnea [ ] Orthopnea [ ] Cough  CV: [ ] Chest pain [ ] Palpitation [ ] Lightheadedness [ ] Syncope  Renal: [ ] Thirst [ ] Edema  GI: [ ] Nausea [ ] Emesis [ ] Abdominal pain [ ] Constipation [ ] Diarrhea  Hem: [ ] Hematemesis [ ] bBright red blood per rectum  ID: [ ] Fever [ ] Chills [ ] Dysuria  ENT: [ ] Rhinorrhea    VITALS:   - Reviewed    IMAGING/DATA:   - Reviewed    PHYSICAL EXAM:    General: ett to vent  CVS: RR  Pulm: CTAB. mechanical bs  GI: Soft, NTNDExtremities: No LE Edema  Neuro: eyes open, does not attend, does not follow commands, BTT on the L, + cough, moves L arm spontaneously in the plane of bed, R arm flexes to noxious, withdraws L leg in plane of bed, TF R leg

## 2022-12-30 NOTE — PROGRESS NOTE ADULT - REASON FOR ADMISSION
thalamic iph

## 2022-12-30 NOTE — PROGRESS NOTE ADULT - ASSESSMENT
ASSESSMENT/PLAN:    71 yo woman with HTN and DM, presented to [facility] 12/18 with HA and R hemiplegia, NIHSS 24, 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 S/p EVD. CTA negative for vascular malformation. With subsequent expansion of ICH on next CTH. MRI brain without any acute findings.     NEURO:  s/p EVD soft pass 12/22  - now comfort care as per family   - DNR, will call palliative in the morning   - neuro checks q4h  - failed EVD clamp trial 12/21 for high ICPs ~20min  - failed repeat EVD clamp trial today due to high ICPs  - EVD raised @20, monitor ICPs  - pain control  - central fever: cooling blanket, bromocriptine    CVS:  - SBP goal   - losartan 100mg  - amlodipine 10mg  - Rt axillary Aliyah placed 12/19  - labetolol 100mg q8     PULM:  Intubated at OSH ED for airway protection  - ETT to vent  - CXR daily  - mucomyst, nebs  - ABG  - CPAP as tolerated     RENAL:  - Fluids: IVL  - daily IOs  - IOs daily  - Na 135-145  - BMPqdh    GI:  - Diet: TF at goal  - GI prophylaxis: PPI while intubated  - Bowel regimen: miralax, senna  - last BM 12/28    ENDO:   - FS goal 120-180  - synthroid  - NPH 20q6h     HEME/ONC:  s/p 1u prbc 12/21 for drop in h/h  - SCDs  - Chemoppx: SQL  - Trend H/H    ID:  CTX (12/24-12/27)  - last cultured 12/28  - 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 Process explained to family and family believes this is what the patient would want. MOLST signed for DNR/DNI/comfort measures only with family and ICU fellow, Dr. Ayoub. Family at bedside, pt to be premedicated and extubated by RT. All questions were answered by ICU staff and Dr. Ayoub.

## 2022-12-30 NOTE — PROGRESS NOTE ADULT - ATTENDING COMMENTS
Agree with/edited as appropriate above
Agree with above.
exam remains poor.  EVD still open @ 20 after clamp/unclamp/raise yesterday, ICPs now within normal limits, drain patent.  possible repeat clamp trial next 24-48 hours, otherwise wean as tolerated.
drain soft-passed overnight.  per NSG, drain was clamped this AM, but failed for elevated ICPs.  drain re-opened, raised to 20, continue to monitor ICPs.  no plan for other operative management.
Agree with above.
EVD open @ 15, drain patent, ICPs now within nl.  GOC discussion with family, patient made DNR.  will continue further trach/PEG/shunt decision-making.
Agree with above.
mental status remains poor, still intubated for airway protection.  family appears unlikely to elect for trach/PEG if the need arises.  EVD still open @ 15, drain patent, has not had further ICP crises.  no plan to challenge again at this time.
Agree with above.
Agree with above.
Agree with/edited as appropriate above
drain soft-passed overnight.  per NSG, drain was clamped this AM, but failed for elevated ICPs.  drain re-opened, raised to 20, continue to monitor ICPs.  no plan for other operative management.
exam remains poor.  EVD open @ 15, still with intermittent ICP issues, no change in exam but ICPs to 30s noted, sluggish flow, catheter flushed @ bedside several times, NSG aware.  will likely need both VPS and trach/PEG bundle.
exam remains poor.  EVD open @ 15, still with intermittent ICP issues, no change in exam but ICPs to 30s noted, sluggish flow, catheter flushed @ bedside several times, NSG aware.  will likely need both VPS and trach/PEG bundle.
Agree with above.
exam remains poor.  EVD still open @ 20 after clamp/unclamp/raise yesterday, ICPs now within normal limits, drain patent.  possible repeat clamp trial next 24-48 hours, otherwise wean as tolerated.

## 2022-12-30 NOTE — PROGRESS NOTE ADULT - SUBJECTIVE AND OBJECTIVE BOX
NSCU Progress Note    Assessment/Hospital Course:    72F hx of HTN, DM who initially presented to [facility] 12/18 for worsening HA and Rt hemplegia LKW 230pm, [id number] NIHSS 24, CTH with L thalamic heme w/ IVH and HCP w/ extension into midbrain and terporoparietal lobe, intubated in ED for airway protecton and EVD placed, admitted to OSH MICU then on interval CTH with expansion of heme and ?spot sign on CTA, txer to [facility] for possible angio.    24 Hour Events/Subjective:  - EVD@20, no ICP issues; failed clamp triax2 last on 12/29 d/t sustained ICP >22  - ongoing [facility]    REVIEW OF SYSTEMS: [x] Unable to Assess due to neurologic exam   [ ] All ROS addressed below are non-contributory, except:  Neuro: [ ] Headache [ ] Back pain [ ] Numbness [ ] Weakness [ ] Ataxia [ ] Dizziness [ ] Aphasia [ ] Dysarthria [ ] Visual disturbance  Resp: [ ] Shortness of breath/dyspnea [ ] Orthopnea [ ] Cough  CV: [ ] Chest pain [ ] Palpitation [ ] Lightheadedness [ ] Syncope  Renal: [ ] Thirst [ ] Edema  GI: [ ] Nausea [ ] Emesis [ ] Abdominal pain [ ] Constipation [ ] Diarrhea  Hem: [ ] Hematemesis [ ] bBright red blood per rectum  ID: [ ] Fever [ ] Chills [ ] Dysuria  ENT: [ ] Rhinorrhea    VITALS:   - Reviewed    IMAGING/DATA:   - Reviewed    PHYSICAL EXAM:    General: ett to vent  CVS: RR  Pulm: CTAB. mechanical bs  GI: Soft, NTNDExtremities: No LE Edema  Neuro: eyes open, does not attend, does not follow commands, BTT on the L, + cough, moves L arm spontaneously in the plane of bed, R arm flexes to noxious, withdraws L leg in plane of bed, TF R leg

## 2022-12-30 NOTE — AIRWAY REMOVAL NOTE  ADULT & PEDS - ARTIFICAL AIRWAY REMOVAL COMMENTS
Written order for extubation verified. The patient was identified by full name and birth date compared to the identification band. Present during the procedure was KANE Patel

## 2022-12-30 NOTE — PROGRESS NOTE ADULT - ASSESSMENT
ASSESSMENT/PLAN:    73 yo woman with HTN and DM, presented to [facility] 12/18 with HA and R hemiplegia, NIHSS 24, 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 S/p EVD. CTA negative for vascular malformation. With subsequent expansion of ICH on next CTH. MRI brain without any acute findings.     NEURO:  s/p EVD soft pass 12/22  - now comfort care as per family   - DNR, will call palliative in the morning   - neuro checks q4h  - failed EVD clamp trial 12/21 for high ICPs ~20min  - failed repeat EVD clamp trial today due to high ICPs  - EVD raised @20, monitor ICPs  - pain control  - central fever: cooling blanket, bromocriptine    CVS:  - SBP goal   - losartan 100mg  - amlodipine 10mg  - Rt axillary Aliyah placed 12/19  - labetolol 100mg q8     PULM:  Intubated at OSH ED for airway protection  - ETT to vent  - CXR daily  - mucomyst, nebs  - ABG  - CPAP as tolerated     RENAL:  - Fluids: IVL  - daily IOs  - IOs daily  - Na 135-145  - BMPqdh    GI:  - Diet: TF at goal  - GI prophylaxis: PPI while intubated  - Bowel regimen: miralax, senna  - last BM 12/28    ENDO:   - FS goal 120-180  - synthroid  - NPH 20q6h     HEME/ONC:  s/p 1u prbc 12/21 for drop in h/h  - SCDs  - Chemoppx: SQL  - Trend H/H    ID:  CTX (12/24-12/27)  - last cultured 12/28  - 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 S/p EVD. CTA negative for vascular malformation. With subsequent expansion of ICH on next CTH. MRI brain without any acute findings.     NEURO:  s/p EVD soft pass 12/22  - now comfort care as per family   - DNR, will call palliative in the morning   - neuro checks q4h  - failed EVD clamp trial 12/21 for high ICPs ~20min  - failed repeat EVD clamp trial today due to high ICPs  - EVD raised @20, monitor ICPs  - pain control  - central fever: cooling blanket, bromocriptine    CVS:  - SBP goal   - losartan 100mg  - amlodipine 10mg  - Rt axillary Aliyah placed 12/19  - labetolol 100mg q8     PULM:  Intubated at OSH ED for airway protection  - ETT to vent  - CXR daily  - mucomyst, nebs  - ABG  - CPAP as tolerated     RENAL:  - Fluids: IVL  - daily IOs  - IOs daily  - Na 135-145  - BMPqdh    GI:  - Diet: TF at goal  - GI prophylaxis: PPI while intubated  - Bowel regimen: miralax, senna  - last BM 12/28    ENDO:   - FS goal 120-180  - synthroid  - NPH 20q6h     HEME/ONC:  s/p 1u prbc 12/21 for drop in h/h  - SCDs  - Chemoppx: SQL  - Trend H/H    ID:  CTX (12/24-12/27)  - last cultured 12/28  - 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 Process explained to family and family believes this is what the patient would want. MOLST signed for DNR/DNI/comfort measures only with family and ICU fellow, Dr. Ayoub. Family at bedside, pt to be premedicated and extubated by RT. All questions were answered by ICU staff and Dr. Ayoub.

## 2022-12-30 NOTE — PROGRESS NOTE ADULT - THIS PATIENT HAS THE FOLLOWING CONDITION(S)/DIAGNOSES ON THIS ADMISSION:
Cerebral Edema/Brain Compression / Herniation/Acute Respiratory Failure
ICH
Cerebral Edema/Brain Compression / Herniation/Acute Respiratory Failure
Cerebral Edema/Brain Compression / Herniation/Acute Respiratory Failure
ICH
Cerebral Edema/Brain Compression / Herniation/Acute Respiratory Failure

## 2022-12-31 LAB
CULTURE RESULTS: SIGNIFICANT CHANGE UP
CULTURE RESULTS: SIGNIFICANT CHANGE UP
SPECIMEN SOURCE: SIGNIFICANT CHANGE UP
SPECIMEN SOURCE: SIGNIFICANT CHANGE UP

## 2023-01-03 LAB
CULTURE RESULTS: SIGNIFICANT CHANGE UP
SPECIMEN SOURCE: SIGNIFICANT CHANGE UP

## 2023-01-05 LAB
CULTURE RESULTS: SIGNIFICANT CHANGE UP
SPECIMEN SOURCE: SIGNIFICANT CHANGE UP

## 2023-02-11 NOTE — DISCHARGE NOTE FOR THE EXPIRED PATIENT - HOSPITAL COURSE
71 y/o woman with PMH of HTN, DM who presented to the ED with 1 day history of AMS.   Upon transport with EMS to the hospital, SBP was in the 190-200 range. In the ED, she was noted to have NIHSS score of 24. Code stroke was called, CTH showed acute intraparenchymal hemorrhage in the L frontoparietal lobe and thalamus with mass effect on the left lateral ventricle/left ambient cistern. Transferred to [facility] from [facility]. Neurosurgery was consulted, pt intubated due to need for airway placement. Bedside external ventricular drain was placed by neurosurgery. Exam remained dismal despite full medical management. Interval CTH with expansion of heme and ?spot sign on CTA, Serial head CTs performed after interval change remained stable which continued to show devastating hemorrhage. Extensive discussion with family regarding imaging and dismal exam. Family approached ICU staff with decision to officially compassionately extubate. Process explained to family and family believed this is what the patient would want. MOLST signed for DNR/DNI/comfort measures only with family and ICU fellow, Dr. Ayoub. Family at bedside, pt to be premedicated and extubated by RT. All questions were answered by ICU staff and Dr. Ayoub. Pt was compassionately extubated on 12/30/22 and based upon cardiopulmonary criteria was pronounced dead at 440am.  73 y/o woman with PMH of HTN, DM who presented to the ED with 1 day history of AMS.   Upon transport with EMS to the hospital, SBP was in the 190-200 range. In the ED, she was noted to have NIHSS score of 24. Code stroke was called, CTH showed acute intraparenchymal hemorrhage in the L frontoparietal lobe and thalamus with mass effect on the left lateral ventricle/left ambient cistern. Transferred to [facility] from [facility] hospital. Neurosurgery was consulted, pt intubated due to need for airway placement. Bedside external ventricular drain was placed by neurosurgery. Exam remained dismal despite full medical management. Interval CTH with expansion of heme and ?spot sign on CTA, Serial head CTs performed after interval change remained stable which continued to show devastating hemorrhage. Extensive discussion with family regarding imaging and dismal exam. Family approached ICU staff with decision to officially compassionately extubate. Process explained to family and family believed this is what the patient would want. MOLST signed for DNR/DNI/comfort measures only with family and ICU fellow, Dr. Ayoub. Family at bedside, pt to be premedicated and extubated by RT. All questions were answered by ICU staff and Dr. Ayoub. Pt was compassionately extubated on 12/30/22 and based upon cardiopulmonary criteria was pronounced dead at 440am.    Addendum: This patient required endotracheal intubation and mechanical ventilation for respiratory failure, the cause of which was neurological injury from a intracerebral bleed.    [place]

## 2023-03-27 NOTE — H&P ADULT - NSICDXNOPASTSURGICALHX_GEN_ALL_CORE
suggests. They can show whether your hearing has changed. Your hearing aid may need to be adjusted. Use other devices as needed. These may include:  Telephone amplifiers and hearing aids that can connect to a television, stereo, radio, or microphone. Devices that use lights or vibrations. These alert you to the doorbell, a ringing telephone, or a baby monitor. Television closed-captioning. This shows the words at the bottom of the screen. Most new TVs can do this. TTY (text telephone). This lets you type messages back and forth on the telephone instead of talking or listening. These devices are also called TDD. When messages are typed on the keyboard, they are sent over the phone line to a receiving TTY. The message is shown on a monitor. Use text messaging, social media, and email if it is hard for you to communicate by telephone. Try to learn a listening technique called speechreading. It is not lipreading. You pay attention to people's gestures, expressions, posture, and tone of voice. These clues can help you understand what a person is saying. Face the person you are talking to, and have them face you. Make sure the lighting is good. You need to see the other person's face clearly. Think about counseling if you need help to adjust to your hearing loss. When should you call for help? Watch closely for changes in your health, and be sure to contact your doctor if:    You think your hearing is getting worse.     You have new symptoms, such as dizziness or nausea. Where can you learn more? Go to http://www.[employer].com/ and enter R798 to learn more about \"Hearing Loss: Care Instructions. \"  Current as of: May 4, 2022               Content Version: 13.6  © 7565-6026 [employer]. Care instructions adapted under license by [facility] ([facility]).  If you have questions about a medical condition or this instruction, always ask your healthcare professional. [facility] [place] 
<-- Click to add NO significant Past Surgical History

## 2023-06-02 NOTE — PATIENT PROFILE ADULT - FALL HARM RISK - DEVICES
None Topical Steroids Applications Pregnancy And Lactation Text: Most topical steroids are considered safe to use during pregnancy and lactation.  Any topical steroid applied to the breast or nipple should be washed off before breastfeeding.
